# Patient Record
Sex: FEMALE | Race: WHITE | Employment: OTHER | URBAN - NONMETROPOLITAN AREA
[De-identification: names, ages, dates, MRNs, and addresses within clinical notes are randomized per-mention and may not be internally consistent; named-entity substitution may affect disease eponyms.]

---

## 2017-01-19 DIAGNOSIS — E78.5 HYPERLIPIDEMIA WITH TARGET LDL LESS THAN 100: ICD-10-CM

## 2017-01-19 DIAGNOSIS — E11.65 TYPE 2 DIABETES MELLITUS WITH HYPERGLYCEMIA, WITHOUT LONG-TERM CURRENT USE OF INSULIN (H): ICD-10-CM

## 2017-01-19 LAB
ANION GAP SERPL CALCULATED.3IONS-SCNC: 8 MMOL/L (ref 3–14)
BUN SERPL-MCNC: 13 MG/DL (ref 7–30)
CALCIUM SERPL-MCNC: 9.4 MG/DL (ref 8.5–10.1)
CHLORIDE SERPL-SCNC: 103 MMOL/L (ref 94–109)
CHOLEST SERPL-MCNC: 210 MG/DL
CO2 SERPL-SCNC: 29 MMOL/L (ref 20–32)
CREAT SERPL-MCNC: 0.74 MG/DL (ref 0.52–1.04)
EST. AVERAGE GLUCOSE BLD GHB EST-MCNC: 148 MG/DL
GFR SERPL CREATININE-BSD FRML MDRD: 77 ML/MIN/1.7M2
GLUCOSE SERPL-MCNC: 132 MG/DL (ref 70–99)
HBA1C MFR BLD: 6.8 % (ref 4.3–6)
HDLC SERPL-MCNC: 69 MG/DL
LDLC SERPL CALC-MCNC: 107 MG/DL
NONHDLC SERPL-MCNC: 141 MG/DL
POTASSIUM SERPL-SCNC: 3.5 MMOL/L (ref 3.4–5.3)
SODIUM SERPL-SCNC: 140 MMOL/L (ref 133–144)
TRIGL SERPL-MCNC: 168 MG/DL

## 2017-01-19 PROCEDURE — 80048 BASIC METABOLIC PNL TOTAL CA: CPT | Performed by: PHYSICIAN ASSISTANT

## 2017-01-19 PROCEDURE — 36415 COLL VENOUS BLD VENIPUNCTURE: CPT | Performed by: PHYSICIAN ASSISTANT

## 2017-01-19 PROCEDURE — 80061 LIPID PANEL: CPT | Performed by: PHYSICIAN ASSISTANT

## 2017-01-19 PROCEDURE — 40000788 ZZHCL STATISTIC ESTIMATED AVERAGE GLUCOSE: Performed by: PHYSICIAN ASSISTANT

## 2017-01-19 PROCEDURE — 83036 HEMOGLOBIN GLYCOSYLATED A1C: CPT | Performed by: PHYSICIAN ASSISTANT

## 2017-02-18 ENCOUNTER — HOSPITAL ENCOUNTER (EMERGENCY)
Facility: HOSPITAL | Age: 77
Discharge: HOME | End: 2017-02-18
Attending: PHYSICIAN ASSISTANT | Admitting: PHYSICIAN ASSISTANT
Payer: MEDICARE

## 2017-02-18 VITALS
TEMPERATURE: 98.2 F | OXYGEN SATURATION: 94 % | HEART RATE: 74 BPM | SYSTOLIC BLOOD PRESSURE: 186 MMHG | DIASTOLIC BLOOD PRESSURE: 94 MMHG | RESPIRATION RATE: 14 BRPM

## 2017-02-18 DIAGNOSIS — I10 ESSENTIAL HYPERTENSION: ICD-10-CM

## 2017-02-18 DIAGNOSIS — R51.9 NONINTRACTABLE HEADACHE, UNSPECIFIED CHRONICITY PATTERN, UNSPECIFIED HEADACHE TYPE: ICD-10-CM

## 2017-02-18 DIAGNOSIS — R82.81 PYURIA: ICD-10-CM

## 2017-02-18 LAB
ALBUMIN UR-MCNC: 30 MG/DL
ANION GAP SERPL CALCULATED.3IONS-SCNC: 11 MMOL/L (ref 3–14)
APPEARANCE UR: ABNORMAL
BACTERIA #/AREA URNS HPF: ABNORMAL /HPF
BASOPHILS # BLD AUTO: 0 10E9/L (ref 0–0.2)
BASOPHILS NFR BLD AUTO: 0.3 %
BILIRUB UR QL STRIP: NEGATIVE
BUN SERPL-MCNC: 16 MG/DL (ref 7–30)
CALCIUM SERPL-MCNC: 9.5 MG/DL (ref 8.5–10.1)
CHLORIDE SERPL-SCNC: 107 MMOL/L (ref 94–109)
CO2 SERPL-SCNC: 23 MMOL/L (ref 20–32)
COLOR UR AUTO: YELLOW
CREAT SERPL-MCNC: 0.78 MG/DL (ref 0.52–1.04)
DIFFERENTIAL METHOD BLD: NORMAL
EOSINOPHIL # BLD AUTO: 0.2 10E9/L (ref 0–0.7)
EOSINOPHIL NFR BLD AUTO: 1.8 %
ERYTHROCYTE [DISTWIDTH] IN BLOOD BY AUTOMATED COUNT: 13.2 % (ref 10–15)
GFR SERPL CREATININE-BSD FRML MDRD: 72 ML/MIN/1.7M2
GLUCOSE SERPL-MCNC: 121 MG/DL (ref 70–99)
GLUCOSE UR STRIP-MCNC: 30 MG/DL
HCT VFR BLD AUTO: 36.5 % (ref 35–47)
HGB BLD-MCNC: 12.7 G/DL (ref 11.7–15.7)
HGB UR QL STRIP: NEGATIVE
IMM GRANULOCYTES # BLD: 0 10E9/L (ref 0–0.4)
IMM GRANULOCYTES NFR BLD: 0.3 %
KETONES UR STRIP-MCNC: NEGATIVE MG/DL
LEUKOCYTE ESTERASE UR QL STRIP: ABNORMAL
LYMPHOCYTES # BLD AUTO: 2.2 10E9/L (ref 0.8–5.3)
LYMPHOCYTES NFR BLD AUTO: 21.9 %
MCH RBC QN AUTO: 31.1 PG (ref 26.5–33)
MCHC RBC AUTO-ENTMCNC: 34.8 G/DL (ref 31.5–36.5)
MCV RBC AUTO: 89 FL (ref 78–100)
MONOCYTES # BLD AUTO: 0.8 10E9/L (ref 0–1.3)
MONOCYTES NFR BLD AUTO: 8.1 %
NEUTROPHILS # BLD AUTO: 6.8 10E9/L (ref 1.6–8.3)
NEUTROPHILS NFR BLD AUTO: 67.6 %
NITRATE UR QL: NEGATIVE
NRBC # BLD AUTO: 0 10*3/UL
NRBC BLD AUTO-RTO: 0 /100
PH UR STRIP: 5.5 PH (ref 4.7–8)
PLATELET # BLD AUTO: 256 10E9/L (ref 150–450)
POTASSIUM SERPL-SCNC: 3.7 MMOL/L (ref 3.4–5.3)
RBC # BLD AUTO: 4.09 10E12/L (ref 3.8–5.2)
RBC #/AREA URNS AUTO: 9 /HPF (ref 0–2)
SODIUM SERPL-SCNC: 141 MMOL/L (ref 133–144)
SP GR UR STRIP: 1.01 (ref 1–1.03)
SQUAMOUS #/AREA URNS AUTO: 17 /HPF (ref 0–1)
URN SPEC COLLECT METH UR: ABNORMAL
UROBILINOGEN UR STRIP-MCNC: NORMAL MG/DL (ref 0–2)
WBC # BLD AUTO: 10 10E9/L (ref 4–11)
WBC #/AREA URNS AUTO: 67 /HPF (ref 0–2)

## 2017-02-18 PROCEDURE — 85025 COMPLETE CBC W/AUTO DIFF WBC: CPT | Performed by: PHYSICIAN ASSISTANT

## 2017-02-18 PROCEDURE — 99283 EMERGENCY DEPT VISIT LOW MDM: CPT | Performed by: PHYSICIAN ASSISTANT

## 2017-02-18 PROCEDURE — 36415 COLL VENOUS BLD VENIPUNCTURE: CPT | Performed by: PHYSICIAN ASSISTANT

## 2017-02-18 PROCEDURE — A9270 NON-COVERED ITEM OR SERVICE: HCPCS | Mod: GY | Performed by: PHYSICIAN ASSISTANT

## 2017-02-18 PROCEDURE — 80048 BASIC METABOLIC PNL TOTAL CA: CPT | Performed by: PHYSICIAN ASSISTANT

## 2017-02-18 PROCEDURE — 99283 EMERGENCY DEPT VISIT LOW MDM: CPT

## 2017-02-18 PROCEDURE — 81001 URINALYSIS AUTO W/SCOPE: CPT | Performed by: PHYSICIAN ASSISTANT

## 2017-02-18 PROCEDURE — 25000132 ZZH RX MED GY IP 250 OP 250 PS 637: Mod: GY | Performed by: PHYSICIAN ASSISTANT

## 2017-02-18 RX ORDER — IBUPROFEN 600 MG/1
600 TABLET, FILM COATED ORAL ONCE
Status: COMPLETED | OUTPATIENT
Start: 2017-02-18 | End: 2017-02-18

## 2017-02-18 RX ORDER — LISINOPRIL 5 MG/1
5 TABLET ORAL ONCE
Status: COMPLETED | OUTPATIENT
Start: 2017-02-18 | End: 2017-02-18

## 2017-02-18 RX ADMIN — LISINOPRIL 5 MG: 5 TABLET ORAL at 18:07

## 2017-02-18 RX ADMIN — IBUPROFEN 600 MG: 600 TABLET ORAL at 18:07

## 2017-02-18 ASSESSMENT — ENCOUNTER SYMPTOMS
FEVER: 0
VOMITING: 0
WEAKNESS: 0
BRUISES/BLEEDS EASILY: 0
SPEECH DIFFICULTY: 0
ACTIVITY CHANGE: 0
CHILLS: 0
NECK PAIN: 0
FATIGUE: 0
ABDOMINAL PAIN: 0
PHOTOPHOBIA: 0
FREQUENCY: 0
SHORTNESS OF BREATH: 0
HEADACHES: 1
DYSURIA: 0
CHEST TIGHTNESS: 0
NUMBNESS: 0
DIZZINESS: 1
PALPITATIONS: 0
NAUSEA: 0

## 2017-02-18 NOTE — ED AVS SNAPSHOT
[facility]    [address] [address]    Phone:  [phone]                                       Ena Lanza   MRN: [id number]    Department:  [facility]   Date of Visit:  2/18/2017           After Visit Summary Signature Page     I have received my discharge instructions, and my questions have been answered. I have discussed any challenges I see with this plan with the nurse or doctor.    ..........................................................................................................................................  Patient/Patient Representative Signature      ..........................................................................................................................................  Patient Representative Print Name and Relationship to Patient    ..................................................               ................................................  Date                                            Time    ..........................................................................................................................................  Reviewed by Signature/Title    ...................................................              ..............................................  Date                                                            Time

## 2017-02-18 NOTE — ED AVS SNAPSHOT
[facility]    [address]    [place] [address]    Phone:  [phone]                                       Ena Lanza   MRN: [id number]    Department:  [facility]   Date of Visit:  2/18/2017           Patient Information     Date Of Birth          1940        Your diagnoses for this visit were:     Nonintractable headache, unspecified chronicity pattern, unspecified headache type     Essential hypertension        You were seen by Anne-Marie Cristobal PA-C.      Follow-up Information     Follow up with Amanda Luu PA.    Specialty:  Family Practice    Contact information:    [facility]  [address]  [place] [address] [phone]          Follow up with [facility].    Specialty:  EMERGENCY MEDICINE    Why:  If symptoms worsen    Contact information:    [address]  [place] [address] [phone]    Additional information:    From [place]: Take [address]. Turn left at [address]. Turn left at the first stoplight on [address]. At the first stop sign, take a right onto [address]. Take a left into the parking lot and continue through until you reach the North enterance of the building.       From [place]: Take [address]. Take the [address] ramp towards [place]. Turn left onto [address]. Take a slight right onto [address]. Turn left at the first stoplight on [address]. At the first stop sign, take a right onto [address]. Take a left into the parking lot and continue through until you reach the North enterance of the building.       From [place]: Take [address]. Take a right at [address]. At the first stop sign, take a right onto [address]. Take a left into the parking lot and continue through until you reach the North enterance of the building.         Discharge Instructions       Follow-up with EFREN Mccloud  on Monday for recheck.     Please return HERE for ANY other concerns or questions.  Especially any return of headache, weakness, dizziness, or simply return here for ANY other concerns or questions.     Discharge References/Attachments     HEADACHES, SELF-CARE FOR (ENGLISH)    HIGH BLOOD PRESSURE, ESTABLISHED, OUT OF CONTROL (ENGLISH)      Future Appointments        Provider Department Dept Phone Center    2/22/2017 2:15 PM [phone] [facility]         Review of your medicines      CONTINUE these medicines which may have CHANGED, or have new prescriptions. If we are uncertain of the size of tablets/capsules you have at home, strength may be listed as something that might have changed.        Dose / Directions Last dose taken    lisinopril 2.5 MG tablet   Commonly known as:  PRINIVIL/Zestril   Dose:  2.5 mg   What changed:  how much to take   Quantity:  90 tablet        Take 1 tablet (2.5 mg) by mouth daily   Refills:  1        metFORMIN 500 MG 24 hr tablet   Commonly known as:  GLUCOPHAGE-XR   Dose:  1000 mg   What changed:  additional instructions   Quantity:  120 tablet        Take 2 tablets (1,000 mg) by mouth 2 times daily (with meals)   Refills:  5          Our records show that you are taking the medicines listed below. If these are incorrect, please call your family doctor or clinic.        Dose / Directions Last dose taken    aspirin 81 MG chewable tablet   Dose:  81 mg   Quantity:  108 tablet        Take 1 tablet (81 mg) by mouth daily   Refills:  3        blood glucose monitoring lancets   Quantity:  102 Box        Use to test blood sugar 2 times daily.   Refills:  5        blood glucose monitoring test strip   Commonly known as:  ACCU-CHEK SMARTVIEW   Quantity:  100 each        Use to test blood sugar 2 times daily or as directed   Refills:  5        ibuprofen 800 MG tablet   Commonly known as:  ADVIL/MOTRIN   Dose:  800 mg   Quantity:  90 tablet         "Take 1 tablet (800 mg) by mouth every 8 hours as needed for moderate pain   Refills:  1        ketoconazole 2 % cream   Commonly known as:  NIZORAL   Quantity:  60 g        Apply topically 2 times daily   Refills:  4        STATIN NOT PRESCRIBED (INTENTIONAL)   Quantity:  0 each        Statin not prescribed intentionally due to Intolerance (with supporting documentation of trying a statin at least once within the last 5 years)   Refills:  0                Procedures and tests performed during your visit     Basic metabolic panel    CBC with platelets differential    UA reflex to Microscopic      Orders Needing Specimen Collection     None      Pending Results     No orders found from 2017 to 2017.            Pending Culture Results     No orders found from 2017 to 2017.            Thank you for choosing [facility]       Thank you for choosing [facility] for your care. Our goal is always to provide you with excellent care. Hearing back from our patients is one way we can continue to improve our services. Please take a few minutes to complete the written survey that you may receive in the mail after you visit with us. Thank you!        [facility] Information     [employer] lets you send messages to your doctor, view your test results, renew your prescriptions, schedule appointments and more. To sign up, go to [email or web address] . Click on \"Log in\" on the left side of the screen, which will take you to the Welcome page. Then click on \"Sign up Now\" on the right side of the page.     You will be asked to enter the access code listed below, as well as some personal information. Please follow the directions to create your username and password.     Your access code is: [email or web address]  Expires: 2017 11:29 AM     Your access code will  in 90 days. If you need help or a new code, please call your [facility] clinic or [phone].        Care EveryWhere ID     This is your Care EveryWhere ID. This could " be used by other organizations to access your [facility] medical records  [id number]        After Visit Summary       This is your record. Keep this with you and show to your community pharmacist(s) and doctor(s) at your next visit.

## 2017-02-19 NOTE — ED NOTES
Pt brought to er by family for increased bp and headache.  Pt reports that she takes lisinopril 5 mg daily.  Pt states that she does not always take her medication.  Her bp was increased on Thursday at the dentist.  She started taking her meds again and even doubled the dose.  Pt reports to er tonight with increased headache and bp.  Assessment as charted.  Pt placed on cardiac and resp monitor.  waage at the bedside

## 2017-02-19 NOTE — DISCHARGE INSTRUCTIONS
Follow-up with EFREN Mccloud on Monday for recheck.     Please return HERE for ANY other concerns or questions.  Especially any return of headache, weakness, dizziness, or simply return here for ANY other concerns or questions.

## 2017-02-19 NOTE — ED PROVIDER NOTES
"  History     Chief Complaint   Patient presents with     Hypertension     \"foggy\" and nauseated last night. headache today. told at dentist last week BP was in the 220s. has not been in to see PCP. appt next week. Pt states that she is on 5mg of lisinopril and \"I have not been very good about taking it. The day I went to the dentist, I didnt take myt lisinopril and I had 2 cups of coffee.\"      The history is provided by the patient.     Ena Lanza is a 76 year old female who presented to the ED ambulatory for evaluation of high blood pressure and a mild headache.  She has chronic HTN and takes Lisinopril 5mg.  She does admit to not taking the lisinopril daily.  No falls.  No troubles waling or talking.  Headache is described as mild and frontal.  No chest pain.  She does have underlying diabetes.  No abdominal pain.      Past Medical History   Diagnosis Date     Anxiety      no treatment     Essential hypertension 10/5/2015     Essential hypertension with goal blood pressure less than 140/90 6/13/2016     Herpes zoster with complication 6/13/2016     HTN (hypertension) 10/29/2014     Hyperlipidemia LDL goal < 100 10/29/2014     Hyperlipidemia LDL goal < 100 10/29/2014     Lesion of sciatic nerve 8/27/2014     Newly recognized heart murmur 6/13/2016     Type 2 diabetes mellitus with hyperglycemia, without long-term current use of insulin (H) 11/28/2016     Type 2 diabetes mellitus without complication (H) 10/5/2015      Social History     Social History     Marital status:      Spouse name: N/A     Number of children: N/A     Years of education: N/A     Social History Main Topics     Smoking status: Never Smoker     Smokeless tobacco: Never Used      Comment: yes passive exposure     Alcohol use No     Drug use: No     Sexual activity: No      Comment:      Other Topics Concern      Service No     Blood Transfusions Yes     Permits if needed     Caffeine Concern Yes     coffee, 5 cups daily "     Seat Belt Yes     Parent/Sibling W/ Cabg, Mi Or Angioplasty Before 65f 55m? No     Social History Narrative      Family History   Problem Relation Age of Onset     C.A.D. Father 71     cause of death     CANCER Father      melanoma     Coronary Artery Disease Father      Thyroid Disease Mother      Hypertension Mother      Thyroid Disease Sister      Other Cancer Sister      melanoma     DIABETES No family hx of      Hyperlipidemia No family hx of      Asthma No family hx of      Breast Cancer No family hx of      Colon Cancer No family hx of        I have reviewed the Medications, Allergies, Past Medical and Surgical History, and Social History in the Epic system.    Review of Systems   Constitutional: Negative for activity change, chills, fatigue and fever.   Eyes: Negative for photophobia and visual disturbance.   Respiratory: Negative for chest tightness and shortness of breath.    Cardiovascular: Negative for chest pain and palpitations.   Gastrointestinal: Negative for abdominal pain, nausea and vomiting.   Genitourinary: Negative for dysuria, frequency, pelvic pain and urgency.   Musculoskeletal: Negative for neck pain.   Skin: Negative.    Neurological: Positive for dizziness and headaches. Negative for syncope, speech difficulty, weakness and numbness.   Hematological: Does not bruise/bleed easily.       Physical Exam   BP: (!) 194/104  Heart Rate: 81  Temp: 98  F (36.7  C)  Resp: 18  SpO2: 98 %  Physical Exam   Constitutional: She is oriented to person, place, and time. She appears well-developed and well-nourished. No distress.   Pleasant and talkative    HENT:   Head is atraumatic and normocephalic.  External auditory canals are clear and without edema or erythema.  TMs are pearly gray and unremarkable. Posterior pharynx has no swelling, erythema, or exudate.  Oral mucosa is pink and moist, without petechia, lesions, or ulcer.  Tongue is midline.  Palate rises symmetric.    Eyes:   Extraocular  movements are intact and smooth throughout the H.  Conjunctiva are normal.  There is no horizontal or vertical nystagmus.  Pupils are equil in size and reactive to light. Lids are unremarkable.  Does have some small bilateral cataracts    Neck: Normal range of motion. Neck supple.   No bruits    Cardiovascular: Normal rate and regular rhythm.    Pulmonary/Chest: Effort normal and breath sounds normal. No respiratory distress.   Abdominal: Soft. There is no tenderness. There is no guarding.   Musculoskeletal: She exhibits no edema.   Neurological: She is alert and oriented to person, place, and time.   Neurological examination:  That the patient was awake and alert, the attention, orientation, concentration, language, memory and fund of knowledge were all normal.  The patient had no neglect.    Smooth speech.  Smooth gait.     Cranial nerve examination: revealed that for cranial nerve   II: the pupils were reactive  III, IV, and VI, the extraocular movements were full.    V: facial sensation intact bilateral   VII: facial movements are symmetric  VIII: hearing intact to voice  IX & X: the soft palate rises symmetrically   XI: shoulder movements are symmetric  XII: tongue is midline   Skin: Skin is warm and dry.   Psychiatric: She has a normal mood and affect.   Nursing note and vitals reviewed.      ED Course     ED Course     Procedures        Medications   lisinopril (PRINIVIL/ZESTRIL) tablet 5 mg (5 mg Oral Given 2/18/17 1807)   ibuprofen (ADVIL/MOTRIN) tablet 600 mg (600 mg Oral Given 2/18/17 1807)     Results for orders placed or performed during the hospital encounter of 02/18/17 (from the past 24 hour(s))   CBC with platelets differential   Result Value Ref Range    WBC 10.0 4.0 - 11.0 10e9/L    RBC Count 4.09 3.8 - 5.2 10e12/L    Hemoglobin 12.7 11.7 - 15.7 g/dL    Hematocrit 36.5 35.0 - 47.0 %    MCV 89 78 - 100 fl    MCH 31.1 26.5 - 33.0 pg    MCHC 34.8 31.5 - 36.5 g/dL    RDW 13.2 10.0 - 15.0 %    Platelet  Count 256 150 - 450 10e9/L    Diff Method Automated Method     % Neutrophils 67.6 %    % Lymphocytes 21.9 %    % Monocytes 8.1 %    % Eosinophils 1.8 %    % Basophils 0.3 %    % Immature Granulocytes 0.3 %    Nucleated RBCs 0 0 /100    Absolute Neutrophil 6.8 1.6 - 8.3 10e9/L    Absolute Lymphocytes 2.2 0.8 - 5.3 10e9/L    Absolute Monocytes 0.8 0.0 - 1.3 10e9/L    Absolute Eosinophils 0.2 0.0 - 0.7 10e9/L    Absolute Basophils 0.0 0.0 - 0.2 10e9/L    Abs Immature Granulocytes 0.0 0 - 0.4 10e9/L    Absolute Nucleated RBC 0.0    Basic metabolic panel   Result Value Ref Range    Sodium 141 133 - 144 mmol/L    Potassium 3.7 3.4 - 5.3 mmol/L    Chloride 107 94 - 109 mmol/L    Carbon Dioxide 23 20 - 32 mmol/L    Anion Gap 11 3 - 14 mmol/L    Glucose 121 (H) 70 - 99 mg/dL    Urea Nitrogen 16 7 - 30 mg/dL    Creatinine 0.78 0.52 - 1.04 mg/dL    GFR Estimate 72 >60 mL/min/1.7m2    GFR Estimate If Black 88 >60 mL/min/1.7m2    Calcium 9.5 8.5 - 10.1 mg/dL   UA reflex to Microscopic   Result Value Ref Range    Color Urine Yellow     Appearance Urine Slightly Cloudy     Glucose Urine 30 (A) NEG mg/dL    Bilirubin Urine Negative NEG    Ketones Urine Negative NEG mg/dL    Specific Gravity Urine 1.015 1.003 - 1.035    Blood Urine Negative NEG    pH Urine 5.5 4.7 - 8.0 pH    Protein Albumin Urine 30 (A) NEG mg/dL    Urobilinogen mg/dL Normal 0.0 - 2.0 mg/dL    Nitrite Urine Negative NEG    Leukocyte Esterase Urine Large (A) NEG    Source Midstream Urine     RBC Urine 9 (H) 0 - 2 /HPF    WBC Urine 67 (H) 0 - 2 /HPF    Bacteria Urine Many (A) NEG /HPF    Squamous Epithelial /HPF Urine 17 (H) 0 - 1 /HPF        Critical Care time:  none               Labs Ordered and Resulted from Time of ED Arrival Up to the Time of Departure from the ED   BASIC METABOLIC PANEL - Abnormal; Notable for the following:        Result Value    Glucose 121 (*)     All other components within normal limits   URINE MACROSCOPIC WITH REFLEX TO MICRO - Abnormal;  Notable for the following:     Glucose Urine 30 (*)     Protein Albumin Urine 30 (*)     Leukocyte Esterase Urine Large (*)     RBC Urine 9 (*)     WBC Urine 67 (*)     Bacteria Urine Many (*)     Squamous Epithelial /HPF Urine 17 (*)     All other components within normal limits   CBC WITH PLATELETS DIFFERENTIAL       Assessments & Plan (with Medical Decision Making)   Work-up as above.  NO indication for adding a different class of antihypertensive. Ibuprofen resolved the headache.  BP came down nicely with simply adding her usual dose of lisinopril.  Her exam is entirely negative.  No indication for EKG for CT.  UA is abnormal but also grossly contaminated.  She has no LUTS.  No indication to treat or culture.  Simply needs a recheck at the clinic.  Return here for ANY return of headaches, weakness, dizziness, difficulty walking or talking, or simply return for ANY other concerns or questions. Ms. Lanza voiced complete understanding and was happy and agreeable.     I have reviewed the nursing notes.    I have reviewed the findings, diagnosis, plan and need for follow up with the patient.    Discharge Medication List as of 2/18/2017  7:39 PM          Final diagnoses:   Nonintractable headache, unspecified chronicity pattern, unspecified headache type   Essential hypertension   Pyuria       2/18/2017   [facility]     Anne-Marie Cristobal PA-C  02/18/17 2002

## 2017-02-20 ENCOUNTER — OFFICE VISIT (OUTPATIENT)
Dept: FAMILY MEDICINE | Facility: OTHER | Age: 77
End: 2017-02-20
Attending: PHYSICIAN ASSISTANT
Payer: COMMERCIAL

## 2017-02-20 VITALS
DIASTOLIC BLOOD PRESSURE: 84 MMHG | TEMPERATURE: 98 F | WEIGHT: 160.2 LBS | SYSTOLIC BLOOD PRESSURE: 156 MMHG | OXYGEN SATURATION: 98 % | HEIGHT: 64 IN | BODY MASS INDEX: 27.35 KG/M2 | RESPIRATION RATE: 16 BRPM | HEART RATE: 77 BPM

## 2017-02-20 DIAGNOSIS — R01.1 HEART MURMUR: Primary | ICD-10-CM

## 2017-02-20 DIAGNOSIS — I10 ESSENTIAL HYPERTENSION WITH GOAL BLOOD PRESSURE LESS THAN 140/90: ICD-10-CM

## 2017-02-20 LAB
ALBUMIN UR-MCNC: ABNORMAL MG/DL
APPEARANCE UR: CLEAR
BACTERIA #/AREA URNS HPF: ABNORMAL /HPF
BILIRUB UR QL STRIP: NEGATIVE
COLOR UR AUTO: YELLOW
GLUCOSE UR STRIP-MCNC: 250 MG/DL
HGB UR QL STRIP: NEGATIVE
KETONES UR STRIP-MCNC: NEGATIVE MG/DL
LEUKOCYTE ESTERASE UR QL STRIP: ABNORMAL
NITRATE UR QL: NEGATIVE
NON-SQ EPI CELLS #/AREA URNS LPF: ABNORMAL /LPF
PH UR STRIP: 5.5 PH (ref 5–7)
RBC #/AREA URNS AUTO: ABNORMAL /HPF (ref 0–2)
SP GR UR STRIP: 1.02 (ref 1–1.03)
URN SPEC COLLECT METH UR: ABNORMAL
UROBILINOGEN UR STRIP-ACNC: 0.2 EU/DL (ref 0.2–1)
WBC #/AREA URNS AUTO: ABNORMAL /HPF (ref 0–2)

## 2017-02-20 PROCEDURE — 81001 URINALYSIS AUTO W/SCOPE: CPT | Performed by: PHYSICIAN ASSISTANT

## 2017-02-20 PROCEDURE — 99212 OFFICE O/P EST SF 10 MIN: CPT

## 2017-02-20 PROCEDURE — 99214 OFFICE O/P EST MOD 30 MIN: CPT | Performed by: PHYSICIAN ASSISTANT

## 2017-02-20 RX ORDER — LISINOPRIL 20 MG/1
20 TABLET ORAL DAILY
Qty: 30 TABLET | Refills: 1 | Status: SHIPPED | OUTPATIENT
Start: 2017-02-20 | End: 2017-03-22

## 2017-02-20 ASSESSMENT — ANXIETY QUESTIONNAIRES
5. BEING SO RESTLESS THAT IT IS HARD TO SIT STILL: MORE THAN HALF THE DAYS
IF YOU CHECKED OFF ANY PROBLEMS ON THIS QUESTIONNAIRE, HOW DIFFICULT HAVE THESE PROBLEMS MADE IT FOR YOU TO DO YOUR WORK, TAKE CARE OF THINGS AT HOME, OR GET ALONG WITH OTHER PEOPLE: NOT DIFFICULT AT ALL
1. FEELING NERVOUS, ANXIOUS, OR ON EDGE: MORE THAN HALF THE DAYS
3. WORRYING TOO MUCH ABOUT DIFFERENT THINGS: SEVERAL DAYS
2. NOT BEING ABLE TO STOP OR CONTROL WORRYING: SEVERAL DAYS
7. FEELING AFRAID AS IF SOMETHING AWFUL MIGHT HAPPEN: NOT AT ALL
GAD7 TOTAL SCORE: 9
6. BECOMING EASILY ANNOYED OR IRRITABLE: NOT AT ALL

## 2017-02-20 ASSESSMENT — PAIN SCALES - GENERAL: PAINLEVEL: NO PAIN (0)

## 2017-02-20 ASSESSMENT — PATIENT HEALTH QUESTIONNAIRE - PHQ9: 5. POOR APPETITE OR OVEREATING: NEARLY EVERY DAY

## 2017-02-20 NOTE — PATIENT INSTRUCTIONS
Thank you for choosing [facility].   I appreciate the opportunity to serve you and look forward to supporting your healthcare needs in the future. Please contact me with any questions or concerns that you may have.    Sincerely,    Amanda Luu RN, PA-C

## 2017-02-20 NOTE — NURSING NOTE
"Chief Complaint   Patient presents with     Hypertension       Initial /84 (BP Location: Right arm, Patient Position: Chair, Cuff Size: Adult Regular)  Pulse 77  Temp 98  F (36.7  C) (Tympanic)  Resp 16  Ht 5' 4\" (1.626 m)  Wt 160 lb 3.2 oz (72.7 kg)  SpO2 98%  BMI 27.5 kg/m2 Estimated body mass index is 27.5 kg/(m^2) as calculated from the following:    Height as of this encounter: 5' 4\" (1.626 m).    Weight as of this encounter: 160 lb 3.2 oz (72.7 kg).  Medication Reconciliation: complete   Jazz Witt    "

## 2017-02-20 NOTE — PROGRESS NOTES
SUBJECTIVE:                                                    Ena Lanza is a 76 year old female who presents to clinic today for the following health issues:      Hypertension Follow-up  Pt has been having elevated BP's at home. Pt did have a HA last week for 3 days. She did go to  on Saturday. The dizziness has cleared up, but nausea continues slightly. Pt has increased her Lisinopril to 10 mg a day.      Outpatient blood pressures are being checked at home.  Results are 209- 162/112-86.    Low Salt Diet: not monitoring salt, but does not add to food/cooking       Amount of exercise or physical activity: None, other than daily activities    Problems taking medications regularly: No    Medication side effects: none  Diet: low salt and carbohydrate counting        Problem list and histories reviewed & adjusted, as indicated.  Additional history: as documented    Patient Active Problem List   Diagnosis     Lesion of sciatic nerve     Hyperlipidemia with target LDL less than 100     HTN (hypertension)     DDD (degenerative disc disease), lumbar     Essential hypertension     Type 2 diabetes mellitus without complication (H)     ACP (advance care planning)     Newly recognized heart murmur     Essential hypertension with goal blood pressure less than 140/90     Herpes zoster with complication     Type 2 diabetes mellitus with hyperglycemia, without long-term current use of insulin (H)     History reviewed. No pertinent past surgical history.    Social History   Substance Use Topics     Smoking status: Never Smoker     Smokeless tobacco: Never Used      Comment: yes passive exposure     Alcohol use No     Family History   Problem Relation Age of Onset     C.A.D. Father 71     cause of death     CANCER Father      melanoma     Coronary Artery Disease Father      Thyroid Disease Mother      Hypertension Mother      Thyroid Disease Sister      Other Cancer Sister      melanoma     DIABETES No family hx of       Hyperlipidemia No family hx of      Asthma No family hx of      Breast Cancer No family hx of      Colon Cancer No family hx of          Current Outpatient Prescriptions   Medication Sig Dispense Refill     blood glucose monitoring (ACCU-CHEK FASTCLIX) lancets Use to test blood sugar 2 times daily. 102 Box 5     blood glucose monitoring (ACCU-CHEK SMARTVIEW) test strip Use to test blood sugar 2 times daily or as directed 100 each 5     metFORMIN (GLUCOPHAGE-XR) 500 MG 24 hr tablet Take 2 tablets (1,000 mg) by mouth 2 times daily (with meals) (Patient taking differently: Take 1,000 mg by mouth 2 times daily (with meals) 500 mg in am and 1000 mg in evening) 120 tablet 5     lisinopril (PRINIVIL,ZESTRIL) 2.5 MG tablet Take 1 tablet (2.5 mg) by mouth daily (Patient taking differently: Take 5 mg by mouth daily ) 90 tablet 1     aspirin 81 MG chewable tablet Take 1 tablet (81 mg) by mouth daily 108 tablet 3     STATIN NOT PRESCRIBED, INTENTIONAL, Statin not prescribed intentionally due to Intolerance (with supporting documentation of trying a statin at least once within the last 5 years) 0 each 0     ketoconazole (NIZORAL) 2 % cream Apply topically 2 times daily 60 g 4     ibuprofen (ADVIL,MOTRIN) 800 MG tablet Take 1 tablet (800 mg) by mouth every 8 hours as needed for moderate pain 90 tablet 1     No Known Allergies  Recent Labs   Lab Test  02/18/17   1806  01/19/17   0916  06/13/16   1050  10/21/15   1008  01/26/15   1348   02/10/14   0945   A1C   --   6.8*  6.7*  6.4*   --    < >  12.9*   LDL   --   107*  110*  143*   --    < >   --    HDL   --   69  67  73   --    < >   --    TRIG   --   168*  202*  199*   --    < >   --    ALT   --    --   18  27  34   --   27   CR  0.78  0.74  0.70  0.65  0.57   < >  0.70   GFRESTIMATED  72  77  81  89  >90  Non  GFR Calc     < >  82   GFRESTBLACK  88  >90   GFR Calc    >90   GFR Calc    >90  >90  African American GFR Calc     < >   ">90   POTASSIUM  3.7  3.5  3.6  3.9  3.5   < >  3.8   TSH   --    --   1.03   --    --    --   1.58    < > = values in this interval not displayed.      BP Readings from Last 3 Encounters:   02/20/17 156/84   02/18/17 (!) 186/94   11/28/16 170/70    Wt Readings from Last 3 Encounters:   02/20/17 160 lb 3.2 oz (72.7 kg)   11/28/16 165 lb (74.8 kg)   06/13/16 158 lb (71.7 kg)                  Problem list, Medication list, Allergies, and Medical/Social/Surgical histories reviewed in [facility] and updated as appropriate.    ROS:  Constitutional, neuro, ENT, endocrine, pulmonary, cardiac, gastrointestinal, genitourinary, musculoskeletal, integument and psychiatric systems are negative, except as otherwise noted.    OBJECTIVE:                                                    /84 (BP Location: Right arm, Patient Position: Chair, Cuff Size: Adult Regular)  Pulse 77  Temp 98  F (36.7  C) (Tympanic)  Resp 16  Ht 5' 4\" (1.626 m)  Wt 160 lb 3.2 oz (72.7 kg)  SpO2 98%  BMI 27.5 kg/m2  Body mass index is 27.5 kg/(m^2).  GENERAL APPEARANCE: healthy, alert and no distress  EYES: Eyes grossly normal to inspection, PERRL and conjunctivae and sclerae normal  HENT: ear canals and TM's normal and nose and mouth without ulcers or lesions  NECK: no adenopathy, no asymmetry, masses, or scars and thyroid normal to palpation  RESP: lungs clear to auscultation - no rales, rhonchi or wheezes  CV: regular rates and rhythm, normal S1 S2, no S3 or S4 and 2/6 murmur right sternal border is loudest no radiation and no click or rub  LYMPHATICS: normal ant/post cervical and supraclavicular nodes  ABDOMEN: soft, nontender, without hepatosplenomegaly or masses and bowel sounds normal  MS: extremities normal- no gross deformities noted  SKIN: no suspicious lesions or rashes  NEURO: Normal strength and tone, mentation intact and speech normal  PSYCH: mentation appears normal and affect normal/bright    Diagnostic test results:  Diagnostic Test " Results:  Results for orders placed or performed during the hospital encounter of 02/18/17   CBC with platelets differential   Result Value Ref Range    WBC 10.0 4.0 - 11.0 10e9/L    RBC Count 4.09 3.8 - 5.2 10e12/L    Hemoglobin 12.7 11.7 - 15.7 g/dL    Hematocrit 36.5 35.0 - 47.0 %    MCV 89 78 - 100 fl    MCH 31.1 26.5 - 33.0 pg    MCHC 34.8 31.5 - 36.5 g/dL    RDW 13.2 10.0 - 15.0 %    Platelet Count 256 150 - 450 10e9/L    Diff Method Automated Method     % Neutrophils 67.6 %    % Lymphocytes 21.9 %    % Monocytes 8.1 %    % Eosinophils 1.8 %    % Basophils 0.3 %    % Immature Granulocytes 0.3 %    Nucleated RBCs 0 0 /100    Absolute Neutrophil 6.8 1.6 - 8.3 10e9/L    Absolute Lymphocytes 2.2 0.8 - 5.3 10e9/L    Absolute Monocytes 0.8 0.0 - 1.3 10e9/L    Absolute Eosinophils 0.2 0.0 - 0.7 10e9/L    Absolute Basophils 0.0 0.0 - 0.2 10e9/L    Abs Immature Granulocytes 0.0 0 - 0.4 10e9/L    Absolute Nucleated RBC 0.0    Basic metabolic panel   Result Value Ref Range    Sodium 141 133 - 144 mmol/L    Potassium 3.7 3.4 - 5.3 mmol/L    Chloride 107 94 - 109 mmol/L    Carbon Dioxide 23 20 - 32 mmol/L    Anion Gap 11 3 - 14 mmol/L    Glucose 121 (H) 70 - 99 mg/dL    Urea Nitrogen 16 7 - 30 mg/dL    Creatinine 0.78 0.52 - 1.04 mg/dL    GFR Estimate 72 >60 mL/min/1.7m2    GFR Estimate If Black 88 >60 mL/min/1.7m2    Calcium 9.5 8.5 - 10.1 mg/dL   UA reflex to Microscopic   Result Value Ref Range    Color Urine Yellow     Appearance Urine Slightly Cloudy     Glucose Urine 30 (A) NEG mg/dL    Bilirubin Urine Negative NEG    Ketones Urine Negative NEG mg/dL    Specific Gravity Urine 1.015 1.003 - 1.035    Blood Urine Negative NEG    pH Urine 5.5 4.7 - 8.0 pH    Protein Albumin Urine 30 (A) NEG mg/dL    Urobilinogen mg/dL Normal 0.0 - 2.0 mg/dL    Nitrite Urine Negative NEG    Leukocyte Esterase Urine Large (A) NEG    Source Midstream Urine     RBC Urine 9 (H) 0 - 2 /HPF    WBC Urine 67 (H) 0 - 2 /HPF    Bacteria Urine Many  (A) NEG /HPF    Squamous Epithelial /HPF Urine 17 (H) 0 - 1 /HPF        ASSESSMENT/PLAN:                                                    1. Essential hypertension with goal blood pressure less than 140/90  She was in the ER, has come down and she is going to be giving us another urine as her last one was contaminated.  She will go to 20 mg on Lisinopril.   She is aware of DASH diet and is avoidant of Salt.   She will be checking her BP at home and then recheck with us again in one month.   - lisinopril (PRINIVIL/ZESTRIL) 20 MG tablet; Take 1 tablet (20 mg) by mouth daily  Dispense: 30 tablet; Refill: 1  - UA with Microscopic reflex to Culture  e    2. Heart murmur  Is fairly loud today.  In past thought i could hear one then the next time I wouldn't.  Get ECHO.   RSB is loudest point.  No dizziness or weakness.  No fainting.  Better control of blood pressure today and we will make sure we are getting her pressure down and see her back rosa  Month.  We have done a good job with her sugars.     - Echocardiogram; Future  - Echocardiogram      See Patient Instructions    Amanda Luu PA-C  [facility]

## 2017-02-20 NOTE — MR AVS SNAPSHOT
After Visit Summary   2/20/2017    Ena Lanza    MRN: [id number]           Patient Information     Date Of Birth          1940        Visit Information        Provider Department      2/20/2017 1:45 PM Amanda Luu PA [facility]        Today's Diagnoses     Heart murmur    -  1    Essential hypertension with goal blood pressure less than 140/90          Care Instructions    Thank you for choosing [facility].   I appreciate the opportunity to serve you and look forward to supporting your healthcare needs in the future. Please contact me with any questions or concerns that you may have.    Sincerely,    Amanda Luu RN, PA-C           Follow-ups after your visit        Your next 10 appointments already scheduled     Feb 22, 2017  2:15 PM CST   (Arrive by 2:00 PM)   SHORT with EFREN Salinas   [facility])    [address]  [facility] [address]   [phone]              Who to contact     If you have questions or need follow up information about today's clinic visit or your schedule please contact [facility] directly at [phone].  Normal or non-critical lab and imaging results will be communicated to you by MyChart, letter or phone within 4 business days after the clinic has received the results. If you do not hear from us within 7 days, please contact the clinic through [employer] or phone. If you have a critical or abnormal lab result, we will notify you by phone as soon as possible.  Submit refill requests through [employer] or call your pharmacy and they will forward the refill request to us. Please allow 3 business days for your refill to be completed.          Additional Information About Your Visit        [employer] Information     [employer] lets you send messages to your doctor, view your test results, renew your prescriptions, schedule appointments and more. To sign up, go to [email or web address] .  "Click on \"Log in\" on the left side of the screen, which will take you to the Welcome page. Then click on \"Sign up Now\" on the right side of the page.     You will be asked to enter the access code listed below, as well as some personal information. Please follow the directions to create your username and password.     Your access code is: [email or web address]  Expires: 2017 11:29 AM     Your access code will  in 90 days. If you need help or a new code, please call your [place] clinic or [phone].        Care EveryWhere ID     This is your Care EveryWhere ID. This could be used by other organizations to access your [place] medical records  [id number]        Your Vitals Were     Pulse Temperature Respirations Height Pulse Oximetry BMI (Body Mass Index)    77 98  F (36.7  C) (Tympanic) 16 5' 4\" (1.626 m) 98% 27.5 kg/m2       Blood Pressure from Last 3 Encounters:   17 156/84   17 (!) 186/94   16 170/70    Weight from Last 3 Encounters:   17 160 lb 3.2 oz (72.7 kg)   16 165 lb (74.8 kg)   16 158 lb (71.7 kg)              We Performed the Following     Echocardiogram     UA with Microscopic reflex to Culture          Today's Medication Changes          These changes are accurate as of: 17  2:26 PM.  If you have any questions, ask your nurse or doctor.               These medicines have changed or have updated prescriptions.        Dose/Directions    lisinopril 20 MG tablet   Commonly known as:  PRINIVIL/ZESTRIL   This may have changed:    - medication strength  - how much to take   Used for:  Essential hypertension with goal blood pressure less than 140/90   Changed by:  Amanda Luu PA        Dose:  20 mg   Take 1 tablet (20 mg) by mouth daily   Quantity:  30 tablet   Refills:  1       metFORMIN 500 MG 24 hr tablet   Commonly known as:  GLUCOPHAGE-XR   This may have changed:  additional instructions   Used for:  Type 2 diabetes mellitus with hyperglycemia, without " long-term current use of insulin (H)        Dose:  1000 mg   Take 2 tablets (1,000 mg) by mouth 2 times daily (with meals)   Quantity:  120 tablet   Refills:  5            Where to get your medicines      These medications were sent to [id number] - [place], [place] - [address] AT [facility] [address], [place] [address]     Phone:  [phone]     lisinopril 20 MG tablet                Primary Care Provider Office Phone # Fax #    EFREN Salinas [phone]       [facility] [address]  [place] [address]        Thank you!     Thank you for choosing [facility]  for your care. Our goal is always to provide you with excellent care. Hearing back from our patients is one way we can continue to improve our services. Please take a few minutes to complete the written survey that you may receive in the mail after your visit with us. Thank you!             Your Updated Medication List - Protect others around you: Learn how to safely use, store and throw away your medicines at www.disposemymeds.org.          This list is accurate as of: 2/20/17  2:26 PM.  Always use your most recent med list.                   Brand Name Dispense Instructions for use    aspirin 81 MG chewable tablet     108 tablet    Take 1 tablet (81 mg) by mouth daily       blood glucose monitoring lancets     102 Box    Use to test blood sugar 2 times daily.       blood glucose monitoring test strip    ACCU-CHEK SMARTVIEW    100 each    Use to test blood sugar 2 times daily or as directed       ibuprofen 800 MG tablet    ADVIL/MOTRIN    90 tablet    Take 1 tablet (800 mg) by mouth every 8 hours as needed for moderate pain       ketoconazole 2 % cream    NIZORAL    60 g    Apply topically 2 times daily       lisinopril 20 MG tablet    PRINIVIL/ZESTRIL    30 tablet    Take 1 tablet (20 mg) by mouth daily       metFORMIN 500 MG 24 hr tablet    GLUCOPHAGE-XR    120  tablet    Take 2 tablets (1,000 mg) by mouth 2 times daily (with meals)       STATIN NOT PRESCRIBED (INTENTIONAL)     0 each    Statin not prescribed intentionally due to Intolerance (with supporting documentation of trying a statin at least once within the last 5 years)

## 2017-02-21 ASSESSMENT — ANXIETY QUESTIONNAIRES: GAD7 TOTAL SCORE: 9

## 2017-02-21 ASSESSMENT — PATIENT HEALTH QUESTIONNAIRE - PHQ9: SUM OF ALL RESPONSES TO PHQ QUESTIONS 1-9: 4

## 2017-03-22 ENCOUNTER — OFFICE VISIT (OUTPATIENT)
Dept: FAMILY MEDICINE | Facility: OTHER | Age: 77
End: 2017-03-22
Attending: PHYSICIAN ASSISTANT
Payer: MEDICARE

## 2017-03-22 ENCOUNTER — HOSPITAL ENCOUNTER (OUTPATIENT)
Dept: ULTRASOUND IMAGING | Facility: HOSPITAL | Age: 77
Discharge: HOME | End: 2017-03-22
Attending: PHYSICIAN ASSISTANT | Admitting: PHYSICIAN ASSISTANT
Payer: MEDICARE

## 2017-03-22 VITALS
WEIGHT: 162 LBS | DIASTOLIC BLOOD PRESSURE: 78 MMHG | BODY MASS INDEX: 27.66 KG/M2 | TEMPERATURE: 98.4 F | HEART RATE: 84 BPM | SYSTOLIC BLOOD PRESSURE: 130 MMHG | HEIGHT: 64 IN | OXYGEN SATURATION: 97 %

## 2017-03-22 DIAGNOSIS — I10 ESSENTIAL HYPERTENSION WITH GOAL BLOOD PRESSURE LESS THAN 140/90: ICD-10-CM

## 2017-03-22 PROCEDURE — 93306 TTE W/DOPPLER COMPLETE: CPT | Mod: 26 | Performed by: INTERNAL MEDICINE

## 2017-03-22 PROCEDURE — 99212 OFFICE O/P EST SF 10 MIN: CPT | Performed by: COUNSELOR

## 2017-03-22 PROCEDURE — 93306 TTE W/DOPPLER COMPLETE: CPT | Mod: TC

## 2017-03-22 PROCEDURE — 99213 OFFICE O/P EST LOW 20 MIN: CPT | Performed by: PHYSICIAN ASSISTANT

## 2017-03-22 ASSESSMENT — PAIN SCALES - GENERAL: PAINLEVEL: NO PAIN (0)

## 2017-03-22 NOTE — MR AVS SNAPSHOT
After Visit Summary   3/22/2017    Ena Lanza    MRN: [id number]           Patient Information     Date Of Birth          1940        Visit Information        Provider Department      3/22/2017 1:45 PM Amanda Luu PA [facility]        Today's Diagnoses     Essential hypertension with goal blood pressure less than 140/90          Care Instructions      Thank you for choosing [facility].   I have office hours 8:00 am to 4:30 pm on Monday's, Wednesday's, Thursday's and Friday's. My nurse and I are out of the office every Tuesday.    Following your visit, when your labs and diagnostic testing have returned, I will review then and you will be contacted by my nurse.  If you are on My Chart, you can also view results there.    For refills, notify your pharmacy regarding what you need and the pharmacy will generate a refill request. Do not call my nurse as she is unable to process refill request. Please plan ahead and allow 3-5 days for refill requests.    You will generally receive a reminder call the day prior to your appointment.  If you cannot attend your appointment, please cancel your appointment with as much notice as possible.  If there is a pattern of failure to present for your appointments, I cannot provide consistent, meaningful, ongoing care for you. It is very important to me that you come in for your care, so we can best assist you with your health care needs.    IMPORTANT:  Please note that it is my standard of practice to NOT participate in prescribing ongoing requested Narcotic Analgesic therapy, and/or participate in the prescribing of other controlled substances.  My nurse and I am happy to assist you with the process of referral for alternative pain management as needed, and other treatment modalities including but not limited to:  Physical Therapy, Physical Medicine and Rehab, Counseling, Chiropractic Care, Orthopedic Care, and non-narcotic  "medication management.     In the event that you need to be seen for emergent concerns and I am out of office,  please see one of my colleagues for acute concerns.  You may also present to UC or ER.  I appreciate the opportunity to serve you and look forward to supporting your healthcare needs in the future. Please contact me with any questions or concerns that you may have.    Sincerely,      Amanda Luu RN, PAAndriaC   \        Follow-ups after your visit        Follow-up notes from your care team     Return in about 6 months (around 2017).      Who to contact     If you have questions or need follow up information about today's clinic visit or your schedule please contact [facility] directly at [phone].  Normal or non-critical lab and imaging results will be communicated to you by MyChart, letter or phone within 4 business days after the clinic has received the results. If you do not hear from us within 7 days, please contact the clinic through [employer] or phone. If you have a critical or abnormal lab result, we will notify you by phone as soon as possible.  Submit refill requests through [employer] or call your pharmacy and they will forward the refill request to us. Please allow 3 business days for your refill to be completed.          Additional Information About Your Visit        [employer] Information     [employer] lets you send messages to your doctor, view your test results, renew your prescriptions, schedule appointments and more. To sign up, go to [email or web address] . Click on \"Log in\" on the left side of the screen, which will take you to the Welcome page. Then click on \"Sign up Now\" on the right side of the page.     You will be asked to enter the access code listed below, as well as some personal information. Please follow the directions to create your username and password.     Your access code is: [email or web address]  Expires: 2017 10:15 AM     Your access code will  in 90 days. " "If you need help or a new code, please call your [place] clinic or [phone].        Care EveryWhere ID     This is your Care EveryWhere ID. This could be used by other organizations to access your [place] medical records  [id number]        Your Vitals Were     Pulse Temperature Height Pulse Oximetry BMI (Body Mass Index)       84 98.4  F (36.9  C) (Tympanic) 5' 4\" (1.626 m) 97% 27.81 kg/m2        Blood Pressure from Last 3 Encounters:   03/22/17 130/78   02/20/17 156/84   02/18/17 (!) 186/94    Weight from Last 3 Encounters:   03/22/17 162 lb (73.5 kg)   02/20/17 160 lb 3.2 oz (72.7 kg)   11/28/16 165 lb (74.8 kg)              Today, you had the following     No orders found for display         Today's Medication Changes          These changes are accurate as of: 3/22/17 11:59 PM.  If you have any questions, ask your nurse or doctor.               These medicines have changed or have updated prescriptions.        Dose/Directions    metFORMIN 500 MG 24 hr tablet   Commonly known as:  GLUCOPHAGE-XR   This may have changed:  additional instructions   Used for:  Type 2 diabetes mellitus with hyperglycemia, without long-term current use of insulin (H)        Dose:  1000 mg   Take 2 tablets (1,000 mg) by mouth 2 times daily (with meals)   Quantity:  120 tablet   Refills:  5                Primary Care Provider Office Phone # Fax #    EFREN Salinas [phone]       [address] [address]        Thank you!     Thank you for choosing [facility]  for your care. Our goal is always to provide you with excellent care. Hearing back from our patients is one way we can continue to improve our services. Please take a few minutes to complete the written survey that you may receive in the mail after your visit with us. Thank you!             Your Updated Medication List - Protect others around you: Learn how to safely use, store and throw away your " medicines at www.disposemymeds.org.          This list is accurate as of: 3/22/17 11:59 PM.  Always use your most recent med list.                   Brand Name Dispense Instructions for use    aspirin 81 MG chewable tablet     108 tablet    Take 1 tablet (81 mg) by mouth daily       blood glucose monitoring lancets     102 Box    Use to test blood sugar 2 times daily.       blood glucose monitoring test strip    ACCU-CHEK SMARTVIEW    100 each    Use to test blood sugar 2 times daily or as directed       ibuprofen 800 MG tablet    ADVIL/MOTRIN    90 tablet    Take 1 tablet (800 mg) by mouth every 8 hours as needed for moderate pain       ketoconazole 2 % cream    NIZORAL    60 g    Apply topically 2 times daily       lisinopril 20 MG tablet    PRINIVIL/ZESTRIL    90 tablet    Take 1 tablet (20 mg) by mouth daily       metFORMIN 500 MG 24 hr tablet    GLUCOPHAGE-XR    120 tablet    Take 2 tablets (1,000 mg) by mouth 2 times daily (with meals)       STATIN NOT PRESCRIBED (INTENTIONAL)     0 each    Statin not prescribed intentionally due to Intolerance (with supporting documentation of trying a statin at least once within the last 5 years)

## 2017-03-22 NOTE — NURSING NOTE
"Chief Complaint   Patient presents with     Hypertension     One month follow up       Initial /78  Pulse 84  Temp 98.4  F (36.9  C) (Tympanic)  Ht 5' 4\" (1.626 m)  Wt 162 lb (73.5 kg)  SpO2 97%  BMI 27.81 kg/m2 Estimated body mass index is 27.81 kg/(m^2) as calculated from the following:    Height as of this encounter: 5' 4\" (1.626 m).    Weight as of this encounter: 162 lb (73.5 kg).  Medication Reconciliation: complete       Saloni Dalal      "

## 2017-03-22 NOTE — PROGRESS NOTES
SUBJECTIVE:                                                    Ena Lanza is a 76 year old female who presents to clinic today for the following health issues:    Hypertension Follow-up      Outpatient blood pressures are being checked at home.  Results are highest 157.    Low Salt Diet: not monitoring salt       Amount of exercise or physical activity: 2-3 days/week for an average of 15-30 minutes    Problems taking medications regularly: No    Medication side effects: none    Diet: regular (no restrictions)          Problem list and histories reviewed & adjusted, as indicated.  Additional history: as documented    Patient Active Problem List   Diagnosis     Lesion of sciatic nerve     Hyperlipidemia with target LDL less than 100     HTN (hypertension)     DDD (degenerative disc disease), lumbar     Essential hypertension     Type 2 diabetes mellitus without complication (H)     ACP (advance care planning)     Newly recognized heart murmur     Essential hypertension with goal blood pressure less than 140/90     Herpes zoster with complication     Type 2 diabetes mellitus with hyperglycemia, without long-term current use of insulin (H)     Heart murmur     No past surgical history on file.    Social History   Substance Use Topics     Smoking status: Never Smoker     Smokeless tobacco: Never Used      Comment: yes passive exposure     Alcohol use No     Family History   Problem Relation Age of Onset     C.A.D. Father 71     cause of death     CANCER Father      melanoma     Coronary Artery Disease Father      Thyroid Disease Mother      Hypertension Mother      Thyroid Disease Sister      Other Cancer Sister      melanoma     DIABETES No family hx of      Hyperlipidemia No family hx of      Asthma No family hx of      Breast Cancer No family hx of      Colon Cancer No family hx of          Current Outpatient Prescriptions   Medication Sig Dispense Refill     lisinopril (PRINIVIL/ZESTRIL) 20 MG tablet Take 1  "tablet (20 mg) by mouth daily 30 tablet 1     blood glucose monitoring (ACCU-CHEK FASTCLIX) lancets Use to test blood sugar 2 times daily. 102 Box 5     blood glucose monitoring (ACCU-CHEK SMARTVIEW) test strip Use to test blood sugar 2 times daily or as directed 100 each 5     metFORMIN (GLUCOPHAGE-XR) 500 MG 24 hr tablet Take 2 tablets (1,000 mg) by mouth 2 times daily (with meals) (Patient taking differently: Take 1,000 mg by mouth 2 times daily (with meals) 500 mg in am and 1000 mg in evening) 120 tablet 5     aspirin 81 MG chewable tablet Take 1 tablet (81 mg) by mouth daily 108 tablet 3     STATIN NOT PRESCRIBED, INTENTIONAL, Statin not prescribed intentionally due to Intolerance (with supporting documentation of trying a statin at least once within the last 5 years) 0 each 0     ketoconazole (NIZORAL) 2 % cream Apply topically 2 times daily 60 g 4     ibuprofen (ADVIL,MOTRIN) 800 MG tablet Take 1 tablet (800 mg) by mouth every 8 hours as needed for moderate pain 90 tablet 1     No Known Allergies  BP Readings from Last 3 Encounters:   03/22/17 130/78   02/20/17 156/84   02/18/17 (!) 186/94    Wt Readings from Last 3 Encounters:   03/22/17 162 lb (73.5 kg)   02/20/17 160 lb 3.2 oz (72.7 kg)   11/28/16 165 lb (74.8 kg)                    Reviewed and updated as needed this visit by clinical staff  Tobacco  Allergies  Meds       Reviewed and updated as needed this visit by Provider         ROS:  Constitutional, HEENT, cardiovascular, pulmonary, gi and gu systems are negative, except as otherwise noted.    OBJECTIVE:                                                    /78  Pulse 84  Temp 98.4  F (36.9  C) (Tympanic)  Ht 5' 4\" (1.626 m)  Wt 162 lb (73.5 kg)  SpO2 97%  BMI 27.81 kg/m2  Body mass index is 27.81 kg/(m^2).  GENERAL APPEARANCE: healthy, alert and no distress  EYES: Eyes grossly normal to inspection, PERRL and conjunctivae and sclerae normal  HENT: ear canals and TM's normal and nose and " mouth without ulcers or lesions  NECK: no adenopathy, no asymmetry, masses, or scars and thyroid normal to palpation  RESP: lungs clear to auscultation - no rales, rhonchi or wheezes  CV: regular rates and rhythm, normal S1 S2, no S3 or S4 and 1/6 murmur, click or rub  LYMPHATICS: normal ant/post cervical and supraclavicular nodes  SKIN: no suspicious lesions or rashes  PSYCH: mentation appears normal and affect normal/bright    Diagnostic test results:  Diagnostic Test Results:  Results for orders placed or performed in visit on 02/20/17   *UA reflex to Microscopic and Culture   Result Value Ref Range    Color Urine Yellow     Appearance Urine Clear     Glucose Urine 250 (A) NEG mg/dL    Bilirubin Urine Negative NEG    Ketones Urine Negative NEG mg/dL    Specific Gravity Urine 1.025 1.003 - 1.035    Blood Urine Negative NEG    pH Urine 5.5 5.0 - 7.0 pH    Protein Albumin Urine Trace (A) NEG mg/dL    Urobilinogen Urine 0.2 0.2 - 1.0 EU/dL    Nitrite Urine Negative NEG    Leukocyte Esterase Urine Small (A) NEG    Source Midstream Urine  LESS THAN 5 CC SPECIMEN      Urine Microscopic   Result Value Ref Range    WBC Urine O - 2 0 - 2 /HPF    RBC Urine NONE SEEN 0 - 2 /HPF    Squamous Epithelial /LPF Urine Few FEW /LPF    Bacteria Urine Few (A) NEG /HPF   Echocardiogram    Narrative    ECHOCARDIOGRAM    M-mode, two-dimensional, color-flow, and Doppler studies were obtained  on this patient.  Standard views were utilized.    ORDERING PHYSICIAN:  SEYMOUR Mccloud    INDICATIONS:  Murmur    Cardiac Dimensions                                    Normal  Dimensions  Aortic Root:                            27.3 mm      Aortic Root  Diameter: 20-37 mm  Left Atrium:                            35.5 mm      Left Atrium:  19-40 mm  Right Ventricle:               not measured      Right Ventricle: 7-23  mm  Left Ventricle end-diastole:   54.2 mm      Left Ventricle  end-diastole: 35-56 mm  Left Ventricle end-systole:     36.6 mm       Left Ventricle  end-systole: 35-56 mm  IVS end-diastole:                      9.7 mm      IVS end-diastole:  7-11 mm  LVPW:                                      9.7 mm      LVPW: 7-11 mm    Review of the study shows there is no pericardial effusion.  The left  ventricle has normal size and systolic function. Ejection fraction is  at 60%. Left atrium is normal; volume is 51 cc and indexed it is 29 cc  per meters squared.  The right ventricle and atrium are normal.  Mitral valve has a slight amount of mitral regurgitation.  The aortic  valve has some calcifications but no significant stenosis nor  insufficiency.  There is slight tricuspid regurgitation.  Unable to  estimate the right ventricle systolic pressure.  Diastolic indices  show reversal of the E to A ratio at 0.66, suggesting diastolic  dysfunction.            Continued on page 2        ASSESSMENT:  Echocardiographic study revealing  1.  Normal left ventricular size and systolic function.  Ejection  fraction at 60%.  2.  Normal atrium.  3.  Normal right ventricle size and function.  4.  Slight mitral regurgitation.  5.  Aortic valve with some calcifications and sclerosis, without  stenosis or insufficiency.  6.  Slight tricuspid regurgitation.  Unable to estimate the right  ventricle systolic pressure.  7.  Evidence of diastolic dysfunction is present.  Exam Date: Mar 22, 2017 11:48:00 AM  Author: SANKET SPRINGER  This report is final and signed          ASSESSMENT/PLAN:                                                    1. Essential hypertension with goal blood pressure less than 140/90  Her pressure is much better.  Has diastolic dysfunction.  On appropriate medications at this time recheck in 6 months.  No coughing of significance but will notify if it gets worse.  See us back in 6 months.   - lisinopril (PRINIVIL/ZESTRIL) 20 MG tablet; Take 1 tablet (20 mg) by mouth daily  Dispense: 90 tablet; Refill: 1      See Patient Instructions    Amanda CORBETT  EFREN Luu  [facility]

## 2017-04-02 RX ORDER — LISINOPRIL 20 MG/1
20 TABLET ORAL DAILY
Qty: 90 TABLET | Refills: 1 | COMMUNITY
Start: 2017-03-22 | End: 2017-10-16

## 2017-04-02 NOTE — PATIENT INSTRUCTIONS
Thank you for choosing [facility].   I have office hours 8:00 am to 4:30 pm on Monday's, Wednesday's, Thursday's and Friday's. My nurse and I are out of the office every Tuesday.    Following your visit, when your labs and diagnostic testing have returned, I will review then and you will be contacted by my nurse.  If you are on My Chart, you can also view results there.    For refills, notify your pharmacy regarding what you need and the pharmacy will generate a refill request. Do not call my nurse as she is unable to process refill request. Please plan ahead and allow 3-5 days for refill requests.    You will generally receive a reminder call the day prior to your appointment.  If you cannot attend your appointment, please cancel your appointment with as much notice as possible.  If there is a pattern of failure to present for your appointments, I cannot provide consistent, meaningful, ongoing care for you. It is very important to me that you come in for your care, so we can best assist you with your health care needs.    IMPORTANT:  Please note that it is my standard of practice to NOT participate in prescribing ongoing requested Narcotic Analgesic therapy, and/or participate in the prescribing of other controlled substances.  My nurse and I am happy to assist you with the process of referral for alternative pain management as needed, and other treatment modalities including but not limited to:  Physical Therapy, Physical Medicine and Rehab, Counseling, Chiropractic Care, Orthopedic Care, and non-narcotic medication management.     In the event that you need to be seen for emergent concerns and I am out of office,  please see one of my colleagues for acute concerns.  You may also present to  or ER.  I appreciate the opportunity to serve you and look forward to supporting your healthcare needs in the future. Please contact me with any questions or concerns that you may  have.    Sincerely,      Amanda Luu RN, PA-C   \

## 2017-04-07 ENCOUNTER — TELEPHONE (OUTPATIENT)
Dept: FAMILY MEDICINE | Facility: OTHER | Age: 77
End: 2017-04-07

## 2017-04-07 NOTE — TELEPHONE ENCOUNTER
10:48 AM    Reason for Call: Phone Call    Description: Pt called and states that she was seen over 2 weeks ago and she still has not heard anything back yet about some results of hers, would like a call back regarding this.    Was an appointment offered for this call? No    Preferred method for responding to this message: Telephone [phone]    If we cannot reach you directly, may we leave a detailed response at the number you provided? Yes    Can this message wait until your PCP/provider returns, if available today? Not applicable,     Nina Martinez

## 2017-05-01 DIAGNOSIS — E11.65 TYPE 2 DIABETES MELLITUS WITH HYPERGLYCEMIA, WITHOUT LONG-TERM CURRENT USE OF INSULIN (H): ICD-10-CM

## 2017-05-04 RX ORDER — METFORMIN HCL 500 MG
TABLET, EXTENDED RELEASE 24 HR ORAL
Qty: 360 TABLET | Refills: 0 | Status: SHIPPED | OUTPATIENT
Start: 2017-05-04 | End: 2018-05-07

## 2017-05-22 ENCOUNTER — OFFICE VISIT (OUTPATIENT)
Dept: FAMILY MEDICINE | Facility: OTHER | Age: 77
End: 2017-05-22
Attending: NURSE PRACTITIONER
Payer: MEDICARE

## 2017-05-22 VITALS
HEIGHT: 64 IN | OXYGEN SATURATION: 97 % | DIASTOLIC BLOOD PRESSURE: 94 MMHG | BODY MASS INDEX: 27.83 KG/M2 | SYSTOLIC BLOOD PRESSURE: 132 MMHG | TEMPERATURE: 97.5 F | HEART RATE: 78 BPM | WEIGHT: 163 LBS

## 2017-05-22 DIAGNOSIS — L08.9 TOE INFECTION: Primary | ICD-10-CM

## 2017-05-22 LAB
GRAM STN SPEC: ABNORMAL
MICRO REPORT STATUS: ABNORMAL
SPECIMEN SOURCE: ABNORMAL

## 2017-05-22 PROCEDURE — 10060 I&D ABSCESS SIMPLE/SINGLE: CPT | Performed by: NURSE PRACTITIONER

## 2017-05-22 PROCEDURE — 87205 SMEAR GRAM STAIN: CPT | Mod: ZL | Performed by: NURSE PRACTITIONER

## 2017-05-22 PROCEDURE — 87070 CULTURE OTHR SPECIMN AEROBIC: CPT | Mod: ZL | Performed by: NURSE PRACTITIONER

## 2017-05-22 PROCEDURE — 87186 SC STD MICRODIL/AGAR DIL: CPT | Mod: ZL | Performed by: NURSE PRACTITIONER

## 2017-05-22 PROCEDURE — 99213 OFFICE O/P EST LOW 20 MIN: CPT | Mod: 25 | Performed by: NURSE PRACTITIONER

## 2017-05-22 PROCEDURE — 99000 SPECIMEN HANDLING OFFICE-LAB: CPT | Performed by: NURSE PRACTITIONER

## 2017-05-22 PROCEDURE — 10160 PNXR ASPIR ABSC HMTMA BULLA: CPT | Performed by: NURSE PRACTITIONER

## 2017-05-22 PROCEDURE — 99212 OFFICE O/P EST SF 10 MIN: CPT | Mod: 25

## 2017-05-22 PROCEDURE — 87077 CULTURE AEROBIC IDENTIFY: CPT | Mod: ZL | Performed by: NURSE PRACTITIONER

## 2017-05-22 RX ORDER — SULFAMETHOXAZOLE/TRIMETHOPRIM 800-160 MG
1 TABLET ORAL 2 TIMES DAILY
Qty: 20 TABLET | Refills: 0 | Status: SHIPPED | OUTPATIENT
Start: 2017-05-22 | End: 2017-08-23

## 2017-05-22 ASSESSMENT — PAIN SCALES - GENERAL: PAINLEVEL: NO PAIN (0)

## 2017-05-22 NOTE — PROGRESS NOTES
SUBJECTIVE:                                                    Ena Lanza is a 76 year old female who presents to clinic today for the following health issues:      Musculoskeletal problem/pain      Duration: 5 days    Description  Location: left great toe    Intensity:  moderate    Accompanying signs and symptoms: warmth, redness and discoloration of nail bed - white creamy under skin    History  Previous similar problem: no   Previous evaluation:  none    Precipitating or alleviating factors:  Trauma or overuse: no   Aggravating factors include: none    Therapies tried and outcome: soak in epsom salt, bacitracin            Problem list and histories reviewed & adjusted, as indicated.  Additional history: as documented    Patient Active Problem List   Diagnosis     Lesion of sciatic nerve     Hyperlipidemia with target LDL less than 100     HTN (hypertension)     DDD (degenerative disc disease), lumbar     Essential hypertension     Type 2 diabetes mellitus without complication (H)     ACP (advance care planning)     Newly recognized heart murmur     Essential hypertension with goal blood pressure less than 140/90     Herpes zoster with complication     Type 2 diabetes mellitus with hyperglycemia, without long-term current use of insulin (H)     Heart murmur     No past surgical history on file.    Social History   Substance Use Topics     Smoking status: Never Smoker     Smokeless tobacco: Never Used      Comment: yes passive exposure     Alcohol use No     Family History   Problem Relation Age of Onset     C.A.D. Father 71     cause of death     CANCER Father      melanoma     Coronary Artery Disease Father      Thyroid Disease Mother      Hypertension Mother      Thyroid Disease Sister      Other Cancer Sister      melanoma     DIABETES No family hx of      Hyperlipidemia No family hx of      Asthma No family hx of      Breast Cancer No family hx of      Colon Cancer No family hx of          Current  "Outpatient Prescriptions   Medication Sig Dispense Refill     metFORMIN (GLUCOPHAGE-XR) 500 MG 24 hr tablet TAKE 2 TABLETS BY MOUTH TWICE DAILY WITH MEALS 360 tablet 0     lisinopril (PRINIVIL/ZESTRIL) 20 MG tablet Take 1 tablet (20 mg) by mouth daily 90 tablet 1     blood glucose monitoring (ACCU-CHEK FASTCLIX) lancets Use to test blood sugar 2 times daily. 102 Box 5     blood glucose monitoring (ACCU-CHEK SMARTVIEW) test strip Use to test blood sugar 2 times daily or as directed 100 each 5     aspirin 81 MG chewable tablet Take 1 tablet (81 mg) by mouth daily 108 tablet 3     STATIN NOT PRESCRIBED, INTENTIONAL, Statin not prescribed intentionally due to Intolerance (with supporting documentation of trying a statin at least once within the last 5 years) 0 each 0     ketoconazole (NIZORAL) 2 % cream Apply topically 2 times daily 60 g 4     ibuprofen (ADVIL,MOTRIN) 800 MG tablet Take 1 tablet (800 mg) by mouth every 8 hours as needed for moderate pain 90 tablet 1     No Known Allergies    Reviewed and updated as needed this visit by clinical staff  Tobacco  Allergies       Reviewed and updated as needed this visit by Provider         ROS:  C: NEGATIVE for fever, chills, change in weight  INTEGUMENTARY/SKIN: left great toe - redness, tenderness and puss at the base of the nail bed  E/M: NEGATIVE for ear, mouth and throat problems  R: NEGATIVE for significant cough or SOB  CV: NEGATIVE for chest pain, palpitations or peripheral edema    OBJECTIVE:                                                    BP (!) 132/94 (BP Location: Right arm, Cuff Size: Adult Regular)  Pulse 78  Temp 97.5  F (36.4  C) (Tympanic)  Ht 5' 4\" (1.626 m)  Wt 163 lb (73.9 kg)  SpO2 97%  BMI 27.98 kg/m2  Body mass index is 27.98 kg/(m^2).   GENERAL: healthy, alert and no distress  RESP: lungs clear to auscultation - no rales, rhonchi or wheezes  CV: regular rate and rhythm, normal S1 S2, no S3 or S4, no murmur, click or rub, no peripheral " edema and peripheral pulses strong  SKIN: left great toe noted to have redness, swelling and white/creamy discharge at the nail bed.  PSYCH: mentation appears normal, affect normal/bright    Diagnostic Test Results:  Left great toe culture sent     ASSESSMENT:                                                    Left great toe with verbal consent from patient area clean, # 9 blade used to puncture site at nail bed collected culture and sent to lab. Drainage was a small amount of purulent non-bloody drainage. Bacitracin applied and Band-Aid to cover site.     PLAN:                                                    ASSESSMENT / PLAN:  (L08.9) Toe infection  (primary encounter diagnosis)  Comment:   Plan:  Wound Culture Aerobic Bacterial, Gram stain,          sulfamethoxazole-trimethoprim (BACTRIM DS/SEPTRA DS) 800-160 MG per tablet,    Puncture (I&D)Drainage of Lesion/Cyst  [65684]   Can continue with daily soaks and bacitracin application             Follow up with primary care provider next week.  Follow up sooner if noted signs or symptoms of infection      ESTEFANY Dejesus CNP  [facility]

## 2017-05-22 NOTE — PATIENT INSTRUCTIONS
Wound Check, Infection  You have a wound that has become infected. The wound will not heal properly unless the infection is cleared. Infection in a wound may also spread if it is not treated. In most cases, antibiotic medicines are prescribed to treat a wound infection.   Symptoms of a wound infection include:    Redness or swelling around the wound    Warmth coming from the wound    New or worsening pain    Red streaks around the wound    Draining pus    Fever  Home care  Follow all directions you are given to treat the infection.  Medicines  Take all medicines as prescribed.     If you were given antibiotics, take them until they are gone or your healthcare provider tells you to stop. It is vital to finish the antibiotics even if you feel better. If you do not finish them, the infection may come back and be harder to treat.    If your infection is not responding to the medicines you are taking, you may be prescribed new medicines.    Take medicine for pain as directed by your healthcare provider.  Wound care  Care for your wound as directed by your healthcare provider.    Apply a warm compress (clean cloth soaked in hot water) to the infected area for about 5 to 10 minutes at a time. Be very careful not to burn yourself. Test the cloth on a non-infected area to make sure it is not too hot.    Continue to change the dressing daily. If it becomes wet, stained with wound fluid, or dirty, change it sooner. To change it:    Wash your hands with soap and water before changing the dressing.    Carefully remove the dressing and tape. If it sticks to the wound, you may need to wet it a little to remove it. (Do not do this if your healthcare provider has told you not to.)    Gently clean the wound with clean water (or saline) using gauze, a clean washcloth, or cotton swab.    Do not use soap, alcohol, peroxide or other cleansers.    If you were told to dry the wound before putting on a new dressing, gently pat. Do not  rub.    Throw out the old dressing.    Wash your hands again before opening the new, clean dressing.    Wash your hands again when you are done.  Follow-up care  Follow up with your healthcare provider as advised. If a culture was done, you will be notified if your treatment needs to change. Call as directed for the results.  When to seek medical advice  Call your health care provider right away if any of these occur:    Symptoms of infection don't start to improve within 2 days of starting antibiotics    Symptoms of infection get worse    New symptoms, such as red streaks around the wound    Fever of 100.4 F (38.0 C) or higher for more than 2 days after starting the antibiotics    3582-1189 [employer]. [address] [place] [address], [place] [address]. All rights reserved. This information is not intended as a substitute for professional medical care. Always follow your healthcare professional's instructions.

## 2017-05-22 NOTE — NURSING NOTE
"Chief Complaint   Patient presents with     Musculoskeletal Problem     infected toe       Initial BP (!) 132/94 (BP Location: Right arm, Cuff Size: Adult Regular)  Pulse 78  Temp 97.5  F (36.4  C) (Tympanic)  Ht 5' 4\" (1.626 m)  Wt 163 lb (73.9 kg)  SpO2 97%  BMI 27.98 kg/m2 Estimated body mass index is 27.98 kg/(m^2) as calculated from the following:    Height as of this encounter: 5' 4\" (1.626 m).    Weight as of this encounter: 163 lb (73.9 kg).  Medication Reconciliation: complete   Reina Turcios LPN      "

## 2017-05-22 NOTE — MR AVS SNAPSHOT
After Visit Summary   5/22/2017    Ena Lanza    MRN: [id number]           Patient Information     Date Of Birth          1940        Visit Information        Provider Department      5/22/2017 10:20 AM Milagro Florence APRN [facility]        Today's Diagnoses     Toe infection    -  1      Care Instructions      Wound Check, Infection  You have a wound that has become infected. The wound will not heal properly unless the infection is cleared. Infection in a wound may also spread if it is not treated. In most cases, antibiotic medicines are prescribed to treat a wound infection.   Symptoms of a wound infection include:    Redness or swelling around the wound    Warmth coming from the wound    New or worsening pain    Red streaks around the wound    Draining pus    Fever  Home care  Follow all directions you are given to treat the infection.  Medicines  Take all medicines as prescribed.     If you were given antibiotics, take them until they are gone or your healthcare provider tells you to stop. It is vital to finish the antibiotics even if you feel better. If you do not finish them, the infection may come back and be harder to treat.    If your infection is not responding to the medicines you are taking, you may be prescribed new medicines.    Take medicine for pain as directed by your healthcare provider.  Wound care  Care for your wound as directed by your healthcare provider.    Apply a warm compress (clean cloth soaked in hot water) to the infected area for about 5 to 10 minutes at a time. Be very careful not to burn yourself. Test the cloth on a non-infected area to make sure it is not too hot.    Continue to change the dressing daily. If it becomes wet, stained with wound fluid, or dirty, change it sooner. To change it:    Wash your hands with soap and water before changing the dressing.    Carefully remove the dressing and tape. If it sticks to the wound, you  may need to wet it a little to remove it. (Do not do this if your healthcare provider has told you not to.)    Gently clean the wound with clean water (or saline) using gauze, a clean washcloth, or cotton swab.    Do not use soap, alcohol, peroxide or other cleansers.    If you were told to dry the wound before putting on a new dressing, gently pat. Do not rub.    Throw out the old dressing.    Wash your hands again before opening the new, clean dressing.    Wash your hands again when you are done.  Follow-up care  Follow up with your healthcare provider as advised. If a culture was done, you will be notified if your treatment needs to change. Call as directed for the results.  When to seek medical advice  Call your health care provider right away if any of these occur:    Symptoms of infection don't start to improve within 2 days of starting antibiotics    Symptoms of infection get worse    New symptoms, such as red streaks around the wound    Fever of 100.4 F (38.0 C) or higher for more than 2 days after starting the antibiotics    2873-3737 [employer]. [address] [place] [address]. All rights reserved. This information is not intended as a substitute for professional medical care. Always follow your healthcare professional's instructions.              Follow-ups after your visit        Follow-up notes from your care team     Return in about 1 week (around 5/29/2017) for wound check.      Who to contact     If you have questions or need follow up information about today's clinic visit or your schedule please contact [facility] directly at [phone].  Normal or non-critical lab and imaging results will be communicated to you by MyChart, letter or phone within 4 business days after the clinic has received the results. If you do not hear from us within 7 days, please contact the clinic through MyChart or phone. If you have a critical or abnormal lab result, we will notify  "you by phone as soon as possible.  Submit refill requests through [employer] or call your pharmacy and they will forward the refill request to us. Please allow 3 business days for your refill to be completed.          Additional Information About Your Visit        [employer] Information     [employer] lets you send messages to your doctor, view your test results, renew your prescriptions, schedule appointments and more. To sign up, go to [email or web address] . Click on \"Log in\" on the left side of the screen, which will take you to the Welcome page. Then click on \"Sign up Now\" on the right side of the page.     You will be asked to enter the access code listed below, as well as some personal information. Please follow the directions to create your username and password.     Your access code is: [email or web address]  Expires: 2017 10:15 AM     Your access code will  in 90 days. If you need help or a new code, please call your [facility] clinic or [phone].        Care EveryWhere ID     This is your Care EveryWhere ID. This could be used by other organizations to access your [facility] medical records  [id number]        Your Vitals Were     Pulse Temperature Height Pulse Oximetry BMI (Body Mass Index)       78 97.5  F (36.4  C) (Tympanic) 5' 4\" (1.626 m) 97% 27.98 kg/m2        Blood Pressure from Last 3 Encounters:   17 (!) 132/94   17 130/78   17 156/84    Weight from Last 3 Encounters:   17 163 lb (73.9 kg)   17 162 lb (73.5 kg)   17 160 lb 3.2 oz (72.7 kg)              We Performed the Following     Gram stain     Wound Culture Aerobic Bacterial          Today's Medication Changes          These changes are accurate as of: 17 10:50 AM.  If you have any questions, ask your nurse or doctor.               Start taking these medicines.        Dose/Directions    sulfamethoxazole-trimethoprim 800-160 MG per tablet   Commonly known as:  BACTRIM DS/SEPTRA DS   Used for:  Toe infection "   Started by:  Milagro Florence APRN CNP        Dose:  1 tablet   Take 1 tablet by mouth 2 times daily   Quantity:  20 tablet   Refills:  0            Where to get your medicines      These medications were sent to [id number] - [place], [place] - [address] [place] [facility] [address], [place] [address]     Phone:  [phone]     sulfamethoxazole-trimethoprim 800-160 MG per tablet                Primary Care Provider Office Phone # Fax #    EFREN Salinas [phone]       [facility] [address]  [facility] [place] [address]        Thank you!     Thank you for choosing [facility]  for your care. Our goal is always to provide you with excellent care. Hearing back from our patients is one way we can continue to improve our services. Please take a few minutes to complete the written survey that you may receive in the mail after your visit with us. Thank you!             Your Updated Medication List - Protect others around you: Learn how to safely use, store and throw away your medicines at www.disposemymeds.org.          This list is accurate as of: 5/22/17 10:50 AM.  Always use your most recent med list.                   Brand Name Dispense Instructions for use    aspirin 81 MG chewable tablet     108 tablet    Take 1 tablet (81 mg) by mouth daily       blood glucose monitoring lancets     102 Box    Use to test blood sugar 2 times daily.       blood glucose monitoring test strip    ACCU-CHEK SMARTVIEW    100 each    Use to test blood sugar 2 times daily or as directed       ibuprofen 800 MG tablet    ADVIL/MOTRIN    90 tablet    Take 1 tablet (800 mg) by mouth every 8 hours as needed for moderate pain       ketoconazole 2 % cream    NIZORAL    60 g    Apply topically 2 times daily       lisinopril 20 MG tablet    PRINIVIL/ZESTRIL    90 tablet    Take 1 tablet (20 mg) by mouth daily       metFORMIN 500 MG 24 hr tablet     GLUCOPHAGE-XR    360 tablet    TAKE 2 TABLETS BY MOUTH TWICE DAILY WITH MEALS       STATIN NOT PRESCRIBED (INTENTIONAL)     0 each    Statin not prescribed intentionally due to Intolerance (with supporting documentation of trying a statin at least once within the last 5 years)       sulfamethoxazole-trimethoprim 800-160 MG per tablet    BACTRIM DS/SEPTRA DS    20 tablet    Take 1 tablet by mouth 2 times daily

## 2017-05-24 DIAGNOSIS — Z53.9 ERRONEOUS ENCOUNTER--DISREGARD: Primary | ICD-10-CM

## 2017-05-24 LAB
BACTERIA SPEC CULT: ABNORMAL
MICRO REPORT STATUS: ABNORMAL
MICROORGANISM SPEC CULT: ABNORMAL
SPECIMEN SOURCE: ABNORMAL

## 2017-05-31 DIAGNOSIS — M54.50 LUMBAGO: ICD-10-CM

## 2017-05-31 RX ORDER — IBUPROFEN 800 MG/1
800 TABLET, FILM COATED ORAL EVERY 8 HOURS PRN
Qty: 90 TABLET | Refills: 1 | Status: SHIPPED | OUTPATIENT
Start: 2017-05-31 | End: 2019-08-23

## 2017-05-31 NOTE — TELEPHONE ENCOUNTER
Ibuprofen      Last Written Prescription Date: 2/9/16  Last Fill Quantity: 90,  # refills: 1   Last Office Visit with G, P or [facility] prescribing provider: 5/22/17

## 2017-08-21 DIAGNOSIS — E11.65 TYPE 2 DIABETES MELLITUS WITH HYPERGLYCEMIA, WITHOUT LONG-TERM CURRENT USE OF INSULIN (H): ICD-10-CM

## 2017-08-21 LAB
EST. AVERAGE GLUCOSE BLD GHB EST-MCNC: 120 MG/DL
HBA1C MFR BLD: 5.8 % (ref 4.3–6)

## 2017-08-21 PROCEDURE — 83036 HEMOGLOBIN GLYCOSYLATED A1C: CPT | Mod: ZL | Performed by: PHYSICIAN ASSISTANT

## 2017-08-21 PROCEDURE — 36415 COLL VENOUS BLD VENIPUNCTURE: CPT | Mod: ZL | Performed by: PHYSICIAN ASSISTANT

## 2017-08-21 PROCEDURE — 40000788 ZZHCL STATISTIC ESTIMATED AVERAGE GLUCOSE: Mod: ZL | Performed by: PHYSICIAN ASSISTANT

## 2017-08-23 ENCOUNTER — OFFICE VISIT (OUTPATIENT)
Dept: FAMILY MEDICINE | Facility: OTHER | Age: 77
End: 2017-08-23
Attending: PHYSICIAN ASSISTANT
Payer: COMMERCIAL

## 2017-08-23 VITALS
BODY MASS INDEX: 27.6 KG/M2 | TEMPERATURE: 98.6 F | SYSTOLIC BLOOD PRESSURE: 128 MMHG | HEIGHT: 62 IN | OXYGEN SATURATION: 98 % | HEART RATE: 78 BPM | DIASTOLIC BLOOD PRESSURE: 86 MMHG | WEIGHT: 150 LBS

## 2017-08-23 DIAGNOSIS — I10 ESSENTIAL HYPERTENSION: ICD-10-CM

## 2017-08-23 DIAGNOSIS — E78.5 HYPERLIPIDEMIA WITH TARGET LDL LESS THAN 100: ICD-10-CM

## 2017-08-23 DIAGNOSIS — E11.9 TYPE 2 DIABETES MELLITUS WITHOUT COMPLICATION, WITHOUT LONG-TERM CURRENT USE OF INSULIN (H): Primary | ICD-10-CM

## 2017-08-23 DIAGNOSIS — Z71.89 ACP (ADVANCE CARE PLANNING): Chronic | ICD-10-CM

## 2017-08-23 DIAGNOSIS — I35.8 AORTIC VALVE SCLEROSIS: ICD-10-CM

## 2017-08-23 PROCEDURE — 99212 OFFICE O/P EST SF 10 MIN: CPT

## 2017-08-23 PROCEDURE — 99214 OFFICE O/P EST MOD 30 MIN: CPT | Performed by: PHYSICIAN ASSISTANT

## 2017-08-23 ASSESSMENT — ANXIETY QUESTIONNAIRES
2. NOT BEING ABLE TO STOP OR CONTROL WORRYING: SEVERAL DAYS
IF YOU CHECKED OFF ANY PROBLEMS ON THIS QUESTIONNAIRE, HOW DIFFICULT HAVE THESE PROBLEMS MADE IT FOR YOU TO DO YOUR WORK, TAKE CARE OF THINGS AT HOME, OR GET ALONG WITH OTHER PEOPLE: NOT DIFFICULT AT ALL
1. FEELING NERVOUS, ANXIOUS, OR ON EDGE: SEVERAL DAYS
GAD7 TOTAL SCORE: 5
7. FEELING AFRAID AS IF SOMETHING AWFUL MIGHT HAPPEN: NOT AT ALL
5. BEING SO RESTLESS THAT IT IS HARD TO SIT STILL: SEVERAL DAYS
6. BECOMING EASILY ANNOYED OR IRRITABLE: NOT AT ALL
4. TROUBLE RELAXING: SEVERAL DAYS
3. WORRYING TOO MUCH ABOUT DIFFERENT THINGS: SEVERAL DAYS

## 2017-08-23 ASSESSMENT — PAIN SCALES - GENERAL: PAINLEVEL: NO PAIN (0)

## 2017-08-23 ASSESSMENT — PATIENT HEALTH QUESTIONNAIRE - PHQ9: SUM OF ALL RESPONSES TO PHQ QUESTIONS 1-9: 0

## 2017-08-23 NOTE — PROGRESS NOTES
SUBJECTIVE:   Ena Lanza is a 77 year old female who presents to clinic today for the following health issues:        Diabetes Follow-up    Patient is checking blood sugars: once daily.  Results are as follows:       am - 100s to 120s     Diabetic concerns: None     Symptoms of hypoglycemia (low blood sugar): none     Paresthesias (numbness or burning in feet) or sores: No     Date of last diabetic eye exam: DUE     Hyperlipidemia Follow-Up      Rate your low fat/cholesterol diet?: good    Taking statin?  No    Other lipid medications/supplements?:  none    Hypertension Follow-up      Outpatient blood pressures are not being checked.  Low Salt Diet: no added salt      Amount of exercise or physical activity: None    Problems taking medications regularly: No    Medication side effects: none  Diet: regular (no restrictions)          Problem list and histories reviewed & adjusted, as indicated.  Additional history: as documented    Patient Active Problem List   Diagnosis     Lesion of sciatic nerve     Hyperlipidemia with target LDL less than 100     HTN (hypertension)     DDD (degenerative disc disease), lumbar     Essential hypertension     Type 2 diabetes mellitus without complication (H)     ACP (advance care planning)     Newly recognized heart murmur     Essential hypertension with goal blood pressure less than 140/90     Herpes zoster with complication     Type 2 diabetes mellitus with hyperglycemia, without long-term current use of insulin (H)     Heart murmur     No past surgical history on file.    Social History   Substance Use Topics     Smoking status: Never Smoker     Smokeless tobacco: Never Used      Comment: yes passive exposure     Alcohol use No     Family History   Problem Relation Age of Onset     C.A.D. Father 71     cause of death     CANCER Father      melanoma     Coronary Artery Disease Father      Thyroid Disease Mother      Hypertension Mother      Thyroid Disease Sister      Other  Cancer Sister      melanoma     DIABETES No family hx of      Hyperlipidemia No family hx of      Asthma No family hx of      Breast Cancer No family hx of      Colon Cancer No family hx of          Current Outpatient Prescriptions   Medication Sig Dispense Refill     ibuprofen (ADVIL/MOTRIN) 800 MG tablet Take 1 tablet (800 mg) by mouth every 8 hours as needed for moderate pain 90 tablet 1     metFORMIN (GLUCOPHAGE-XR) 500 MG 24 hr tablet TAKE 2 TABLETS BY MOUTH TWICE DAILY WITH MEALS 360 tablet 0     lisinopril (PRINIVIL/ZESTRIL) 20 MG tablet Take 1 tablet (20 mg) by mouth daily 90 tablet 1     blood glucose monitoring (ACCU-CHEK FASTCLIX) lancets Use to test blood sugar 2 times daily. 102 Box 5     blood glucose monitoring (ACCU-CHEK SMARTVIEW) test strip Use to test blood sugar 2 times daily or as directed 100 each 5     aspirin 81 MG chewable tablet Take 1 tablet (81 mg) by mouth daily 108 tablet 3     STATIN NOT PRESCRIBED, INTENTIONAL, Statin not prescribed intentionally due to Intolerance (with supporting documentation of trying a statin at least once within the last 5 years) 0 each 0     No Known Allergies  Recent Labs   Lab Test  08/21/17   0844  02/18/17   1806  01/19/17   0916  06/13/16   1050  10/21/15   1008  01/26/15   1348   02/10/14   0945   A1C  5.8   --   6.8*  6.7*  6.4*   --    < >  12.9*   LDL   --    --   107*  110*  143*   --    < >   --    HDL   --    --   69  67  73   --    < >   --    TRIG   --    --   168*  202*  199*   --    < >   --    ALT   --    --    --   18  27  34   --   27   CR   --   0.78  0.74  0.70  0.65  0.57   < >  0.70   GFRESTIMATED   --   72  77  81  89  >90  Non  GFR Calc     < >  82   GFRESTBLACK   --   88  >90   GFR Calc    >90   GFR Calc    >90  >90  African American GFR Calc     < >  >90   POTASSIUM   --   3.7  3.5  3.6  3.9  3.5   < >  3.8   TSH   --    --    --   1.03   --    --    --   1.58    < > = values in this  "interval not displayed.      BP Readings from Last 3 Encounters:   08/23/17 128/86   05/22/17 (!) 132/94   03/22/17 130/78    Wt Readings from Last 3 Encounters:   08/23/17 150 lb (68 kg)   05/22/17 163 lb (73.9 kg)   03/22/17 162 lb (73.5 kg)                          Reviewed and updated as needed this visit by clinical staff     Reviewed and updated as needed this visit by Provider         ROS:  Constitutional, neuro, ENT, endocrine, pulmonary, cardiac, gastrointestinal, genitourinary, musculoskeletal, integument and psychiatric systems are negative, except as otherwise noted.      OBJECTIVE:                                                    /86 (BP Location: Left arm, Patient Position: Chair, Cuff Size: Adult Regular)  Pulse 78  Temp 98.6  F (37  C) (Tympanic)  Ht 5' 2\" (1.575 m)  Wt 150 lb (68 kg)  SpO2 98%  Breastfeeding? No  BMI 27.44 kg/m2  Body mass index is 27.44 kg/(m^2).  GENERAL APPEARANCE: healthy, alert and no distress  EYES: Eyes grossly normal to inspection, PERRL and conjunctivae and sclerae normal  HENT: ear canals and TM's normal and nose and mouth without ulcers or lesions  NECK: no adenopathy, no asymmetry, masses, or scars and thyroid normal to palpation  RESP: lungs clear to auscultation - no rales, rhonchi or wheezes  CV: regular rates and rhythm, normal S1 S2, no S3 or S4 and 2/6 murmur LSB with slight click,   LYMPHATICS: normal ant/post cervical and supraclavicular nodes  ABDOMEN: soft, nontender, without hepatosplenomegaly or masses and bowel sounds normal  MS: extremities normal- no gross deformities noted  SKIN: no suspicious lesions or rashes  NEURO: Normal strength and tone, mentation intact and speech normal  DIABETIC FOOT EXAM: normal DP and PT pulses, no trophic changes or ulcerative lesions and normal sensory exam  PSYCH: mentation appears normal and affect normal/bright    Diagnostic test results:  Diagnostic Test Results:  No results found for this or any previous " visit (from the past 24 hour(s)).  Results for orders placed or performed in visit on 08/21/17   Hemoglobin A1c   Result Value Ref Range    Hemoglobin A1C 5.8 4.3 - 6.0 %   Estimated Average Glucose   Result Value Ref Range    Estimated Average Glucose 120 mg/dL          ASSESSMENT/PLAN:                                                    1. ACP (advance care planning)  Given and completed placed on file today.     2. Type 2 diabetes mellitus without complication, without long-term current use of insulin (H)  Doing so much better with weight loss and her new teeth.   She has had no low sugars.     3. Essential hypertension  Tolerated lisinopril without singh coughing.  Labs are up to date.     4. Hyperlipidemia with target LDL less than 100  Given her pass on medication due to reactions from this.  Good diet and exercise.  Risks are reviewed.     5. Aortic valve sclerosis  Echo was done on 3/17.   She is not SOB or dizzy or other sx.           See Patient Instructions    EFREN Troy  [facility]

## 2017-08-23 NOTE — PATIENT INSTRUCTIONS
Thank you for choosing [facility].   I have office hours 8:00 am to 4:30 pm on Monday's, Wednesday's, Thursday's and Friday's. My nurse and I are out of the office every Tuesday.    Following your visit, when your labs and diagnostic testing have returned, I will review then and you will be contacted by my nurse.  If you are on My Chart, you can also view results there.    For refills, notify your pharmacy regarding what you need and the pharmacy will generate a refill request. Do not call my nurse as she is unable to process refill request. Please plan ahead and allow 3-5 days for refill requests.    You will generally receive a reminder call the day prior to your appointment.  If you cannot attend your appointment, please cancel your appointment with as much notice as possible.  If there is a pattern of failure to present for your appointments, I cannot provide consistent, meaningful, ongoing care for you. It is very important to me that you come in for your care, so we can best assist you with your health care needs.    IMPORTANT:  Please note that it is my standard of practice to NOT participate in prescribing ongoing requested Narcotic Analgesic therapy, and/or participate in the prescribing of other controlled substances.  My nurse and I am happy to assist you with the process of referral for alternative pain management as needed, and other treatment modalities including but not limited to:  Physical Therapy, Physical Medicine and Rehab, Counseling, Chiropractic Care, Orthopedic Care, and non-narcotic medication management.     In the event that you need to be seen for emergent concerns and I am out of office,  please see one of my colleagues for acute concerns.  You may also present to  or ER.  I appreciate the opportunity to serve you and look forward to supporting your healthcare needs in the future. Please contact me with any questions or concerns that you may  have.    Sincerely,      Amanda Luu RN, PA-C

## 2017-08-23 NOTE — MR AVS SNAPSHOT
After Visit Summary   8/23/2017    Ena Lanza    MRN: [id number]           Patient Information     Date Of Birth          1940        Visit Information        Provider Department      8/23/2017 1:45 PM Amanda Luu PA [facility]        Today's Diagnoses     Type 2 diabetes mellitus without complication, without long-term current use of insulin (H)    -  1    ACP (advance care planning)        Essential hypertension        Hyperlipidemia with target LDL less than 100        Aortic valve sclerosis          Care Instructions      Thank you for choosing [facility].   I have office hours 8:00 am to 4:30 pm on Monday's, Wednesday's, Thursday's and Friday's. My nurse and I are out of the office every Tuesday.    Following your visit, when your labs and diagnostic testing have returned, I will review then and you will be contacted by my nurse.  If you are on My Chart, you can also view results there.    For refills, notify your pharmacy regarding what you need and the pharmacy will generate a refill request. Do not call my nurse as she is unable to process refill request. Please plan ahead and allow 3-5 days for refill requests.    You will generally receive a reminder call the day prior to your appointment.  If you cannot attend your appointment, please cancel your appointment with as much notice as possible.  If there is a pattern of failure to present for your appointments, I cannot provide consistent, meaningful, ongoing care for you. It is very important to me that you come in for your care, so we can best assist you with your health care needs.    IMPORTANT:  Please note that it is my standard of practice to NOT participate in prescribing ongoing requested Narcotic Analgesic therapy, and/or participate in the prescribing of other controlled substances.  My nurse and I am happy to assist you with the process of referral for alternative pain management as needed,  "and other treatment modalities including but not limited to:  Physical Therapy, Physical Medicine and Rehab, Counseling, Chiropractic Care, Orthopedic Care, and non-narcotic medication management.     In the event that you need to be seen for emergent concerns and I am out of office,  please see one of my colleagues for acute concerns.  You may also present to  or ER.  I appreciate the opportunity to serve you and look forward to supporting your healthcare needs in the future. Please contact me with any questions or concerns that you may have.    Sincerely,      Amanda Luu RN, PA-C               Follow-ups after your visit        Who to contact     If you have questions or need follow up information about today's clinic visit or your schedule please contact [facility] directly at [phone].  Normal or non-critical lab and imaging results will be communicated to you by MyChart, letter or phone within 4 business days after the clinic has received the results. If you do not hear from us within 7 days, please contact the clinic through MyChart or phone. If you have a critical or abnormal lab result, we will notify you by phone as soon as possible.  Submit refill requests through [facility] or call your pharmacy and they will forward the refill request to us. Please allow 3 business days for your refill to be completed.          Additional Information About Your Visit        Care EveryWhere ID     This is your Care EveryWhere ID. This could be used by other organizations to access your [place] medical records  [id number]        Your Vitals Were     Pulse Temperature Height Pulse Oximetry Breastfeeding? BMI (Body Mass Index)    78 98.6  F (37  C) (Tympanic) 5' 2\" (1.575 m) 98% No 27.44 kg/m2       Blood Pressure from Last 3 Encounters:   08/23/17 128/86   05/22/17 (!) 132/94   03/22/17 130/78    Weight from Last 3 Encounters:   08/23/17 150 lb (68 kg)   05/22/17 163 lb (73.9 kg)   03/22/17 162 lb " (73.5 kg)              Today, you had the following     No orders found for display       Primary Care Provider Office Phone # Fax #    EFREN Salinas [phone]       [facility] [address]  [place] [address]        Equal Access to Services     MOISES FRANCO : Hadii aad ku hadasho Soomaali, waaxda luqadaha, qaybta kaalmada adeegyada, waxay idiin hayaan adeeg kharash lakenya . So [facility] [phone].    ATENCIÓN: Si habla español, tiene a pugh disposición servicios gratuitos de asistencia lingüística. Jude al [phone].    We comply with applicable federal civil rights laws and [place] laws. We do not discriminate on the basis of race, color, national origin, age, disability sex, sexual orientation or gender identity.            Thank you!     Thank you for choosing [facility]  for your care. Our goal is always to provide you with excellent care. Hearing back from our patients is one way we can continue to improve our services. Please take a few minutes to complete the written survey that you may receive in the mail after your visit with us. Thank you!             Your Updated Medication List - Protect others around you: Learn how to safely use, store and throw away your medicines at www.disposemymeds.org.          This list is accurate as of: 8/23/17  1:51 PM.  Always use your most recent med list.                   Brand Name Dispense Instructions for use Diagnosis    aspirin 81 MG chewable tablet     108 tablet    Take 1 tablet (81 mg) by mouth daily        blood glucose monitoring lancets     102 Box    Use to test blood sugar 2 times daily.    Type 2 diabetes mellitus with hyperglycemia, without long-term current use of insulin (H)       blood glucose monitoring test strip    ACCU-CHEK SMARTVIEW    100 each    Use to test blood sugar 2 times daily or as directed    Type 2 diabetes mellitus with hyperglycemia, without long-term current use of insulin  (H)       ibuprofen 800 MG tablet    ADVIL/MOTRIN    90 tablet    Take 1 tablet (800 mg) by mouth every 8 hours as needed for moderate pain    Lumbago       lisinopril 20 MG tablet    PRINIVIL/ZESTRIL    90 tablet    Take 1 tablet (20 mg) by mouth daily    Essential hypertension with goal blood pressure less than 140/90       metFORMIN 500 MG 24 hr tablet    GLUCOPHAGE-XR    360 tablet    TAKE 2 TABLETS BY MOUTH TWICE DAILY WITH MEALS    Type 2 diabetes mellitus with hyperglycemia, without long-term current use of insulin (H)       STATIN NOT PRESCRIBED (INTENTIONAL)     0 each    Statin not prescribed intentionally due to Intolerance (with supporting documentation of trying a statin at least once within the last 5 years)    Type 2 diabetes mellitus without complication (H), Hyperlipidemia with target LDL less than 100

## 2017-08-23 NOTE — NURSING NOTE
"Chief Complaint   Patient presents with     Hypertension     Diabetes     Lipids     *_* Health Care Directive *_*     copy requested        Initial /86 (BP Location: Left arm, Patient Position: Chair, Cuff Size: Adult Regular)  Pulse 78  Temp 98.6  F (37  C) (Tympanic)  Ht 5' 2\" (1.575 m)  Wt 150 lb (68 kg)  SpO2 98%  Breastfeeding? No  BMI 27.44 kg/m2 Estimated body mass index is 27.44 kg/(m^2) as calculated from the following:    Height as of this encounter: 5' 2\" (1.575 m).    Weight as of this encounter: 150 lb (68 kg).  Medication Reconciliation: complete   Yari Barber CMA(AAMA)   "

## 2017-08-24 ASSESSMENT — ANXIETY QUESTIONNAIRES: GAD7 TOTAL SCORE: 5

## 2017-09-14 ENCOUNTER — TELEPHONE (OUTPATIENT)
Dept: FAMILY MEDICINE | Facility: OTHER | Age: 77
End: 2017-09-14

## 2017-09-14 NOTE — TELEPHONE ENCOUNTER
Please offer 315 appt with Amanda Luu tomorrow if the person it is being held for does not call back. Otherwise will need first opening with amanda or see another provider.  Ashly Lima LPN

## 2017-09-14 NOTE — TELEPHONE ENCOUNTER
3:48 PM    Reason for Call: OVERBOOK    Patient is having the following symptoms: Possible lymes disease states that is what she believes it is.    The patient is requesting an appointment for (09/15/17) with Amanda Luu.    Was an appointment offered for this call? No  If yes : Appointment type              Date    Preferred method for responding to this message: Telephone Call  What is your phone number ?    If we cannot reach you directly, may we leave a detailed response at the number you provided? Yes    Can this message wait until your PCP/provider returns, if unavailable today? Not applicable,     Nina Martinez

## 2017-10-04 ENCOUNTER — OFFICE VISIT (OUTPATIENT)
Dept: FAMILY MEDICINE | Facility: OTHER | Age: 77
End: 2017-10-04
Attending: PHYSICIAN ASSISTANT
Payer: COMMERCIAL

## 2017-10-04 VITALS
OXYGEN SATURATION: 99 % | TEMPERATURE: 97.6 F | HEIGHT: 64 IN | SYSTOLIC BLOOD PRESSURE: 134 MMHG | RESPIRATION RATE: 18 BRPM | BODY MASS INDEX: 26.63 KG/M2 | HEART RATE: 71 BPM | DIASTOLIC BLOOD PRESSURE: 78 MMHG | WEIGHT: 156 LBS

## 2017-10-04 DIAGNOSIS — L30.9 ECZEMA OF BOTH HANDS: Primary | ICD-10-CM

## 2017-10-04 DIAGNOSIS — Z23 NEED FOR PROPHYLACTIC VACCINATION AND INOCULATION AGAINST INFLUENZA: ICD-10-CM

## 2017-10-04 PROCEDURE — 99213 OFFICE O/P EST LOW 20 MIN: CPT | Performed by: PHYSICIAN ASSISTANT

## 2017-10-04 PROCEDURE — G0008 ADMIN INFLUENZA VIRUS VAC: HCPCS | Performed by: PHYSICIAN ASSISTANT

## 2017-10-04 PROCEDURE — 99212 OFFICE O/P EST SF 10 MIN: CPT

## 2017-10-04 PROCEDURE — 90662 IIV NO PRSV INCREASED AG IM: CPT | Performed by: PHYSICIAN ASSISTANT

## 2017-10-04 RX ORDER — HYDROXYZINE HYDROCHLORIDE 25 MG/1
TABLET, FILM COATED ORAL
Qty: 60 TABLET | Refills: 1 | Status: SHIPPED | OUTPATIENT
Start: 2017-10-04 | End: 2018-04-19

## 2017-10-04 RX ORDER — CEPHALEXIN 500 MG/1
500 CAPSULE ORAL 3 TIMES DAILY
Qty: 30 CAPSULE | Refills: 0 | Status: SHIPPED | OUTPATIENT
Start: 2017-10-04 | End: 2018-04-19

## 2017-10-04 RX ORDER — MUPIROCIN 20 MG/G
OINTMENT TOPICAL 3 TIMES DAILY
Qty: 30 G | Refills: 1 | Status: SHIPPED | OUTPATIENT
Start: 2017-10-04 | End: 2017-10-18

## 2017-10-04 RX ORDER — CLOBETASOL PROPIONATE 0.5 MG/G
OINTMENT TOPICAL 2 TIMES DAILY
Qty: 60 G | Refills: 1 | Status: SHIPPED | OUTPATIENT
Start: 2017-10-04 | End: 2018-05-03

## 2017-10-04 ASSESSMENT — ANXIETY QUESTIONNAIRES
3. WORRYING TOO MUCH ABOUT DIFFERENT THINGS: SEVERAL DAYS
5. BEING SO RESTLESS THAT IT IS HARD TO SIT STILL: NOT AT ALL
7. FEELING AFRAID AS IF SOMETHING AWFUL MIGHT HAPPEN: NOT AT ALL
1. FEELING NERVOUS, ANXIOUS, OR ON EDGE: SEVERAL DAYS
2. NOT BEING ABLE TO STOP OR CONTROL WORRYING: SEVERAL DAYS
IF YOU CHECKED OFF ANY PROBLEMS ON THIS QUESTIONNAIRE, HOW DIFFICULT HAVE THESE PROBLEMS MADE IT FOR YOU TO DO YOUR WORK, TAKE CARE OF THINGS AT HOME, OR GET ALONG WITH OTHER PEOPLE: NOT DIFFICULT AT ALL
GAD7 TOTAL SCORE: 4
6. BECOMING EASILY ANNOYED OR IRRITABLE: NOT AT ALL
4. TROUBLE RELAXING: SEVERAL DAYS

## 2017-10-04 ASSESSMENT — PATIENT HEALTH QUESTIONNAIRE - PHQ9: SUM OF ALL RESPONSES TO PHQ QUESTIONS 1-9: 0

## 2017-10-04 ASSESSMENT — PAIN SCALES - GENERAL: PAINLEVEL: NO PAIN (0)

## 2017-10-04 NOTE — PATIENT INSTRUCTIONS
Thank you for choosing [facility].   I have office hours 8:00 am to 4:30 pm on Monday's, Wednesday's, Thursday's and Friday's. My nurse and I are out of the office every Tuesday.    Following your visit, when your labs and diagnostic testing have returned, I will review then and you will be contacted by my nurse.  If you are on My Chart, you can also view results there.    For refills, notify your pharmacy regarding what you need and the pharmacy will generate a refill request. Do not call my nurse as she is unable to process refill request. Please plan ahead and allow 3-5 days for refill requests.    You will generally receive a reminder call the day prior to your appointment.  If you cannot attend your appointment, please cancel your appointment with as much notice as possible.  If there is a pattern of failure to present for your appointments, I cannot provide consistent, meaningful, ongoing care for you. It is very important to me that you come in for your care, so we can best assist you with your health care needs.    IMPORTANT:  Please note that it is my standard of practice to NOT participate in prescribing ongoing requested Narcotic Analgesic therapy, and/or participate in the prescribing of other controlled substances.  My nurse and I am happy to assist you with the process of referral for alternative pain management as needed, and other treatment modalities including but not limited to:  Physical Therapy, Physical Medicine and Rehab, Counseling, Chiropractic Care, Orthopedic Care, and non-narcotic medication management.     In the event that you need to be seen for emergent concerns and I am out of office,  please see one of my colleagues for acute concerns.  You may also present to  or ER.  I appreciate the opportunity to serve you and look forward to supporting your healthcare needs in the future. Please contact me with any questions or concerns that you may  have.    Sincerely,      Amanda Luu RN, PA-C

## 2017-10-04 NOTE — PROGRESS NOTES
SUBJECTIVE:   Ena Lanza is a 77 year old female who presents to clinic today for the following health issues:      Rash      Duration: 20 years off and on    Description  Location: bilateral [place]   Itching: severe    Intensity:  severe    Accompanying signs and symptoms: Rash and peeling skin    History (similar episodes/previous evaluation): on and off for the past 20 years per patient    Precipitating or alleviating factors:  New exposures:  None  Recent travel: no      Therapies tried and outcome: Tried several creams and treatments            Problem list and histories reviewed & adjusted, as indicated.  Additional history: as documented    Patient Active Problem List   Diagnosis     Lesion of sciatic nerve     Hyperlipidemia with target LDL less than 100     HTN (hypertension)     DDD (degenerative disc disease), lumbar     Essential hypertension     Type 2 diabetes mellitus without complication (H)     ACP (advance care planning)     Newly recognized heart murmur     Essential hypertension with goal blood pressure less than 140/90     Herpes zoster with complication     Type 2 diabetes mellitus with hyperglycemia, without long-term current use of insulin (H)     Heart murmur     Aortic valve sclerosis     History reviewed. No pertinent surgical history.    Social History   Substance Use Topics     Smoking status: Never Smoker     Smokeless tobacco: Never Used      Comment: yes passive exposure     Alcohol use No     Family History   Problem Relation Age of Onset     C.A.D. Father 71     cause of death     CANCER Father      melanoma     Coronary Artery Disease Father      Thyroid Disease Mother      Hypertension Mother      Thyroid Disease Sister      Other Cancer Sister      melanoma     DIABETES No family hx of      Hyperlipidemia No family hx of      Asthma No family hx of      Breast Cancer No family hx of      Colon Cancer No family hx of          No Known Allergies  Recent Labs   Lab Test   "08/21/17   0844  02/18/17   1806  01/19/17   0916  06/13/16   1050  10/21/15   1008  01/26/15   1348   02/10/14   0945   A1C  5.8   --   6.8*  6.7*  6.4*   --    < >  12.9*   LDL   --    --   107*  110*  143*   --    < >   --    HDL   --    --   69  67  73   --    < >   --    TRIG   --    --   168*  202*  199*   --    < >   --    ALT   --    --    --   18  27  34   --   27   CR   --   0.78  0.74  0.70  0.65  0.57   < >  0.70   GFRESTIMATED   --   72  77  81  89  >90  Non  GFR Calc     < >  82   GFRESTBLACK   --   88  >90   GFR Calc    >90   GFR Calc    >90  >90  African American GFR Calc     < >  >90   POTASSIUM   --   3.7  3.5  3.6  3.9  3.5   < >  3.8   TSH   --    --    --   1.03   --    --    --   1.58    < > = values in this interval not displayed.      BP Readings from Last 3 Encounters:   10/04/17 134/78   08/23/17 128/86   05/22/17 (!) 132/94    Wt Readings from Last 3 Encounters:   10/04/17 156 lb (70.8 kg)   08/23/17 150 lb (68 kg)   05/22/17 163 lb (73.9 kg)                          Reviewed and updated as needed this visit by clinical staff     Reviewed and updated as needed this visit by Provider     ROS:  Constitutional, HEENT, cardiovascular, pulmonary, gi and gu systems are negative, except as otherwise noted.      OBJECTIVE:                                                    /78 (BP Location: Left arm, Patient Position: Sitting, Cuff Size: Adult Regular)  Pulse 71  Temp 97.6  F (36.4  C) (Tympanic)  Resp 18  Ht 5' 4\" (1.626 m)  Wt 156 lb (70.8 kg)  SpO2 99%  BMI 26.78 kg/m2  Body mass index is 26.78 kg/(m^2).  GENERAL APPEARANCE: healthy, alert and no distress  MS: both hands show severe dermatitis with bleeding and secondary infection.   Scratching non stop in office. Both palms are quite irritated.  Cracking.    SKIN: as above.   NEURO: Normal strength and tone, mentation intact and speech normal  PSYCH: mentation appears normal, worried " and making some life changes.       Diagnostic test results:  Diagnostic Test Results:  none        ASSESSMENT/PLAN:                                                    1. Eczema of both hands  We are going to try below.  Stop the itching and watch infection.   We will see her back in 30 days.  Then we can further assess her need for dermatology.   She will avoid chemicals and drying out.   - hydrOXYzine (ATARAX) 25 MG tablet; Take one to two pills at bedtime for itch.  Dispense: 60 tablet; Refill: 1  - clobetasol (TEMOVATE) 0.05 % ointment; Apply topically 2 times daily  Dispense: 60 g; Refill: 1  - cephALEXin (KEFLEX) 500 MG capsule; Take 1 capsule (500 mg) by mouth 3 times daily  Dispense: 30 capsule; Refill: 0  - mupirocin (BACTROBAN) 2 % ointment; Apply topically 3 times daily for 14 days  Dispense: 30 g; Refill: 1    2. Need for prophylactic vaccination and inoculation against influenza  Given.   - HC FLU VACCINE, INCREASED ANTIGEN, PRESV FREE  - Vaccine Administration, Initial [87671]      See Patient Instructions    EFREN [place]  [facility]    Injectable Influenza Immunization Documentation    1.  Is the person to be vaccinated sick today?  NO    2. Does the person to be vaccinated have an allergy to a component   of the vaccine?   No    3. Has the person to be vaccinated ever had a serious reaction   to influenza vaccine in the past?   No    4. Has the person to be vaccinated ever had Guillain-Barré syndrome?   No    Form completed by Sheyla Maria MA

## 2017-10-04 NOTE — NURSING NOTE
"Chief Complaint   Patient presents with     Derm Problem     peeling skin and rash on both hands     Flu Shot       Initial /78 (BP Location: Left arm, Patient Position: Sitting, Cuff Size: Adult Regular)  Pulse 71  Temp 97.6  F (36.4  C) (Tympanic)  Resp 18  Ht 5' 4\" (1.626 m)  Wt 156 lb (70.8 kg)  SpO2 99%  BMI 26.78 kg/m2 Estimated body mass index is 26.78 kg/(m^2) as calculated from the following:    Height as of this encounter: 5' 4\" (1.626 m).    Weight as of this encounter: 156 lb (70.8 kg).  Medication Reconciliation: complete   Sheyla Maria MA  "

## 2017-10-04 NOTE — MR AVS SNAPSHOT
After Visit Summary   10/4/2017    Ena Lanza    MRN: [id number]           Patient Information     Date Of Birth          1940        Visit Information        Provider Department      10/4/2017 11:00 AM Amanda Luu PA [facility] [place]        Today's Diagnoses     Eczema of both hands    -  1    Need for prophylactic vaccination and inoculation against influenza          Care Instructions      Thank you for choosing [facility].   I have office hours 8:00 am to 4:30 pm on Monday's, Wednesday's, Thursday's and Friday's. My nurse and I are out of the office every Tuesday.    Following your visit, when your labs and diagnostic testing have returned, I will review then and you will be contacted by my nurse.  If you are on My Chart, you can also view results there.    For refills, notify your pharmacy regarding what you need and the pharmacy will generate a refill request. Do not call my nurse as she is unable to process refill request. Please plan ahead and allow 3-5 days for refill requests.    You will generally receive a reminder call the day prior to your appointment.  If you cannot attend your appointment, please cancel your appointment with as much notice as possible.  If there is a pattern of failure to present for your appointments, I cannot provide consistent, meaningful, ongoing care for you. It is very important to me that you come in for your care, so we can best assist you with your health care needs.    IMPORTANT:  Please note that it is my standard of practice to NOT participate in prescribing ongoing requested Narcotic Analgesic therapy, and/or participate in the prescribing of other controlled substances.  My nurse and I am happy to assist you with the process of referral for alternative pain management as needed, and other treatment modalities including but not limited to:  Physical Therapy, Physical Medicine and Rehab, Counseling, Chiropractic Care,  "Orthopedic Care, and non-narcotic medication management.     In the event that you need to be seen for emergent concerns and I am out of office,  please see one of my colleagues for acute concerns.  You may also present to UC or ER.  I appreciate the opportunity to serve you and look forward to supporting your healthcare needs in the future. Please contact me with any questions or concerns that you may have.    Sincerely,      Amanda Luu RN, PA-C               Follow-ups after your visit        Follow-up notes from your care team     Return in about 4 weeks (around 11/1/2017).      Who to contact     If you have questions or need follow up information about today's clinic visit or your schedule please contact [facility] directly at [phone].  Normal or non-critical lab and imaging results will be communicated to you by MyChart, letter or phone within 4 business days after the clinic has received the results. If you do not hear from us within 7 days, please contact the clinic through MyChart or phone. If you have a critical or abnormal lab result, we will notify you by phone as soon as possible.  Submit refill requests through [employer] or call your pharmacy and they will forward the refill request to us. Please allow 3 business days for your refill to be completed.          Additional Information About Your Visit        Care EveryWhere ID     This is your Care EveryWhere ID. This could be used by other organizations to access your [place] medical records  [id number]        Your Vitals Were     Pulse Temperature Respirations Height Pulse Oximetry BMI (Body Mass Index)    71 97.6  F (36.4  C) (Tympanic) 18 5' 4\" (1.626 m) 99% 26.78 kg/m2       Blood Pressure from Last 3 Encounters:   10/04/17 134/78   08/23/17 128/86   05/22/17 (!) 132/94    Weight from Last 3 Encounters:   10/04/17 156 lb (70.8 kg)   08/23/17 150 lb (68 kg)   05/22/17 163 lb (73.9 kg)              We Performed the Following     " HC FLU VACCINE, INCREASED ANTIGEN, PRESV FREE     Vaccine Administration, Initial [10899]          Today's Medication Changes          These changes are accurate as of: 10/4/17 11:31 AM.  If you have any questions, ask your nurse or doctor.               Start taking these medicines.        Dose/Directions    cephALEXin 500 MG capsule   Commonly known as:  KEFLEX   Used for:  Eczema of both hands   Started by:  Amanda Luu PA        Dose:  500 mg   Take 1 capsule (500 mg) by mouth 3 times daily   Quantity:  30 capsule   Refills:  0       clobetasol 0.05 % ointment   Commonly known as:  TEMOVATE   Used for:  Eczema of both hands   Started by:  Amanda Luu PA        Apply topically 2 times daily   Quantity:  60 g   Refills:  1       hydrOXYzine 25 MG tablet   Commonly known as:  ATARAX   Used for:  Eczema of both hands   Started by:  Amanda Luu PA        Take one to two pills at bedtime for itch.   Quantity:  60 tablet   Refills:  1       mupirocin 2 % ointment   Commonly known as:  BACTROBAN   Used for:  Eczema of both hands   Started by:  Amanda Luu PA        Apply topically 3 times daily for 14 days   Quantity:  30 g   Refills:  1            Where to get your medicines      These medications were sent to [id number] - [place], [place] - [address] AT [facility] [address]  [address], [place] [address]     Phone:  [phone]     cephALEXin 500 MG capsule    clobetasol 0.05 % ointment    hydrOXYzine 25 MG tablet    mupirocin 2 % ointment                Primary Care Provider Office Phone # Fax #    EFREN Salinas [phone]       [facility] [address]  [place] [address]        Equal Access to Services     [facility] SALVADOR AH: Hadisabel elizabeth Soclaire, waaxda luqadaha, qaybta kaalmada adeashelyyada, jasmina juan. So [facility] [phone].    ATENCIÓN: Si habla español, tiene a pugh disposición  servicios gratuitos de asistencia lingüística. Jude tim [phone].    We comply with applicable federal civil rights laws and [place] laws. We do not discriminate on the basis of race, color, national origin, age, disability, sex, sexual orientation, or gender identity.            Thank you!     Thank you for choosing [facility]  for your [facility]. Our goal is always to provide you with excellent care. Hearing back from our patients is one way we can continue to improve our services. Please take a few minutes to complete the written survey that you may receive in the mail after your visit with us. Thank you!             Your Updated Medication List - Protect others around you: Learn how to safely use, store and throw away your medicines at www.disposemymeds.org.          This list is accurate as of: 10/4/17 11:31 AM.  Always use your most recent med list.                   Brand Name Dispense Instructions for use Diagnosis    aspirin 81 MG chewable tablet     108 tablet    Take 1 tablet (81 mg) by mouth daily        blood glucose monitoring lancets     102 Box    Use to test blood sugar 2 times daily.    Type 2 diabetes mellitus with hyperglycemia, without long-term current use of insulin (H)       blood glucose monitoring test strip    ACCU-CHEK SMARTVIEW    100 each    Use to test blood sugar 2 times daily or as directed    Type 2 diabetes mellitus with hyperglycemia, without long-term current use of insulin (H)       cephALEXin 500 MG capsule    KEFLEX    30 capsule    Take 1 capsule (500 mg) by mouth 3 times daily    Eczema of both hands       clobetasol 0.05 % ointment    TEMOVATE    60 g    Apply topically 2 times daily    Eczema of both hands       hydrOXYzine 25 MG tablet    ATARAX    60 tablet    Take one to two pills at bedtime for itch.    Eczema of both hands       ibuprofen 800 MG tablet    ADVIL/MOTRIN    90 tablet    Take 1 tablet (800 mg) by mouth every 8 hours as needed for moderate  pain    Lumbago       lisinopril 20 MG tablet    PRINIVIL/ZESTRIL    90 tablet    Take 1 tablet (20 mg) by mouth daily    Essential hypertension with goal blood pressure less than 140/90       metFORMIN 500 MG 24 hr tablet    GLUCOPHAGE-XR    360 tablet    TAKE 2 TABLETS BY MOUTH TWICE DAILY WITH MEALS    Type 2 diabetes mellitus with hyperglycemia, without long-term current use of insulin (H)       mupirocin 2 % ointment    BACTROBAN    30 g    Apply topically 3 times daily for 14 days    Eczema of both hands       STATIN NOT PRESCRIBED (INTENTIONAL)     0 each    Statin not prescribed intentionally due to Intolerance (with supporting documentation of trying a statin at least once within the last 5 years)    Type 2 diabetes mellitus without complication (H), Hyperlipidemia with target LDL less than 100

## 2017-10-05 ASSESSMENT — ANXIETY QUESTIONNAIRES: GAD7 TOTAL SCORE: 4

## 2017-10-16 DIAGNOSIS — I10 ESSENTIAL HYPERTENSION WITH GOAL BLOOD PRESSURE LESS THAN 140/90: ICD-10-CM

## 2017-10-16 NOTE — TELEPHONE ENCOUNTER
Lisinopril      Last Written Prescription Date: 3/22/17  Last Fill Quantity: 90, # refills: 1  Last Office Visit with [facility], [facility] or [facility] prescribing provider: 10/4/17  Next 5 appointments (look out 90 days)     Nov 01, 2017  1:15 PM CDT   (Arrive by 1:00 PM)   Office Visit with EFREN Salinas   [facility] [place] ([facility] )    [address]  [place] [address]   [phone]                   Potassium   Date Value Ref Range Status   02/18/2017 3.7 3.4 - 5.3 mmol/L Final     Creatinine   Date Value Ref Range Status   02/18/2017 0.78 0.52 - 1.04 mg/dL Final     BP Readings from Last 3 Encounters:   10/04/17 134/78   08/23/17 128/86   05/22/17 (!) 132/94

## 2017-10-18 RX ORDER — LISINOPRIL 20 MG/1
TABLET ORAL
Qty: 90 TABLET | Refills: 2 | Status: SHIPPED | OUTPATIENT
Start: 2017-10-18 | End: 2018-05-03

## 2017-11-09 DIAGNOSIS — E11.65 TYPE 2 DIABETES MELLITUS WITH HYPERGLYCEMIA, WITHOUT LONG-TERM CURRENT USE OF INSULIN (H): ICD-10-CM

## 2017-11-09 NOTE — TELEPHONE ENCOUNTER
Metformin      Last Written Prescription Date: 5/4/17  Last Fill Quantity: 360,  # refills: 0   Last Office Visit with G, P or [facility] prescribing provider: 10/4/17

## 2017-11-10 NOTE — TELEPHONE ENCOUNTER
Metformin         Last Written Prescription Date: 5/4/17  Last Fill Quantity: 360, # refills: 0  Last Office Visit with [facility] or [facility] prescribing provider:  10/4/17        BP Readings from Last 3 Encounters:   10/04/17 134/78   08/23/17 128/86   05/22/17 (!) 132/94     Lab Results   Component Value Date    MICROL 45 06/13/2016     Lab Results   Component Value Date    UMALCR 58.65 06/13/2016     Creatinine   Date Value Ref Range Status   02/18/2017 0.78 0.52 - 1.04 mg/dL Final   ]  GFR Estimate   Date Value Ref Range Status   02/18/2017 72 >60 mL/min/1.7m2 Final     Comment:     Non  GFR Calc   01/19/2017 77 >60 mL/min/1.7m2 Final     Comment:     Non  GFR Calc   06/13/2016 81 >60 mL/min/1.7m2 Final     Comment:     Non  GFR Calc     GFR Estimate If Black   Date Value Ref Range Status   02/18/2017 88 >60 mL/min/1.7m2 Final     Comment:      GFR Calc   01/19/2017 >90   GFR Calc   >60 mL/min/1.7m2 Final   06/13/2016 >90   GFR Calc   >60 mL/min/1.7m2 Final     Lab Results   Component Value Date    CHOL 210 01/19/2017     Lab Results   Component Value Date    HDL 69 01/19/2017     Lab Results   Component Value Date     01/19/2017     Lab Results   Component Value Date    TRIG 168 01/19/2017     Lab Results   Component Value Date    CHOLHDLRATIO 3.5 10/21/2015     Lab Results   Component Value Date    AST 14 06/13/2016     Lab Results   Component Value Date    ALT 18 06/13/2016     Lab Results   Component Value Date    A1C 5.8 08/21/2017    A1C 6.8 01/19/2017    A1C 6.7 06/13/2016    A1C 6.4 10/21/2015    A1C 6.1 10/29/2014     Potassium   Date Value Ref Range Status   02/18/2017 3.7 3.4 - 5.3 mmol/L Final

## 2017-11-13 DIAGNOSIS — E11.65 TYPE 2 DIABETES MELLITUS WITH HYPERGLYCEMIA, WITHOUT LONG-TERM CURRENT USE OF INSULIN (H): ICD-10-CM

## 2017-11-13 RX ORDER — METFORMIN HCL 500 MG
TABLET, EXTENDED RELEASE 24 HR ORAL
Qty: 120 TABLET | Refills: 1 | Status: SHIPPED | OUTPATIENT
Start: 2017-11-13 | End: 2018-02-28

## 2017-11-13 NOTE — TELEPHONE ENCOUNTER
BP Readings from Last 3 Encounters:   10/04/17 134/78   08/23/17 128/86   05/22/17 (!) 132/94     Lab Results   Component Value Date    MICROL 45 06/13/2016     Lab Results   Component Value Date    UMALCR 58.65 06/13/2016     Creatinine   Date Value Ref Range Status   02/18/2017 0.78 0.52 - 1.04 mg/dL Final   ]  GFR Estimate   Date Value Ref Range Status   02/18/2017 72 >60 mL/min/1.7m2 Final     Comment:     Non  GFR Calc   01/19/2017 77 >60 mL/min/1.7m2 Final     Comment:     Non  GFR Calc   06/13/2016 81 >60 mL/min/1.7m2 Final     Comment:     Non  GFR Calc     GFR Estimate If Black   Date Value Ref Range Status   02/18/2017 88 >60 mL/min/1.7m2 Final     Comment:      GFR Calc   01/19/2017 >90   GFR Calc   >60 mL/min/1.7m2 Final   06/13/2016 >90   GFR Calc   >60 mL/min/1.7m2 Final     Lab Results   Component Value Date    CHOL 210 01/19/2017     Lab Results   Component Value Date    HDL 69 01/19/2017     Lab Results   Component Value Date     01/19/2017     Lab Results   Component Value Date    TRIG 168 01/19/2017     Lab Results   Component Value Date    CHOLHDLRATIO 3.5 10/21/2015     Lab Results   Component Value Date    AST 14 06/13/2016     Lab Results   Component Value Date    ALT 18 06/13/2016     Lab Results   Component Value Date    A1C 5.8 08/21/2017    A1C 6.8 01/19/2017    A1C 6.7 06/13/2016    A1C 6.4 10/21/2015    A1C 6.1 10/29/2014     Potassium   Date Value Ref Range Status   02/18/2017 3.7 3.4 - 5.3 mmol/L Final

## 2017-11-14 ENCOUNTER — TELEPHONE (OUTPATIENT)
Dept: FAMILY MEDICINE | Facility: OTHER | Age: 77
End: 2017-11-14

## 2017-11-14 DIAGNOSIS — E11.65 TYPE 2 DIABETES MELLITUS WITH HYPERGLYCEMIA, WITHOUT LONG-TERM CURRENT USE OF INSULIN (H): ICD-10-CM

## 2017-11-14 RX ORDER — METFORMIN HCL 500 MG
TABLET, EXTENDED RELEASE 24 HR ORAL
Qty: 120 TABLET | Refills: 0 | OUTPATIENT
Start: 2017-11-14

## 2017-11-16 RX ORDER — METFORMIN HCL 500 MG
TABLET, EXTENDED RELEASE 24 HR ORAL
Qty: 120 TABLET | Refills: 0 | OUTPATIENT
Start: 2017-11-16

## 2018-02-28 DIAGNOSIS — E11.65 TYPE 2 DIABETES MELLITUS WITH HYPERGLYCEMIA, WITHOUT LONG-TERM CURRENT USE OF INSULIN (H): ICD-10-CM

## 2018-02-28 RX ORDER — METFORMIN HCL 500 MG
TABLET, EXTENDED RELEASE 24 HR ORAL
Qty: 120 TABLET | Refills: 0 | Status: SHIPPED | OUTPATIENT
Start: 2018-02-28 | End: 2018-04-19

## 2018-04-19 ENCOUNTER — RADIANT APPOINTMENT (OUTPATIENT)
Dept: GENERAL RADIOLOGY | Facility: OTHER | Age: 78
End: 2018-04-19
Attending: FAMILY MEDICINE
Payer: MEDICARE

## 2018-04-19 ENCOUNTER — OFFICE VISIT (OUTPATIENT)
Dept: FAMILY MEDICINE | Facility: OTHER | Age: 78
End: 2018-04-19
Attending: PHYSICIAN ASSISTANT
Payer: MEDICARE

## 2018-04-19 VITALS
BODY MASS INDEX: 27.51 KG/M2 | HEART RATE: 85 BPM | DIASTOLIC BLOOD PRESSURE: 68 MMHG | OXYGEN SATURATION: 98 % | TEMPERATURE: 98.2 F | SYSTOLIC BLOOD PRESSURE: 162 MMHG | WEIGHT: 160.25 LBS

## 2018-04-19 DIAGNOSIS — M25.562 ACUTE PAIN OF LEFT KNEE: ICD-10-CM

## 2018-04-19 DIAGNOSIS — M25.562 ACUTE PAIN OF LEFT KNEE: Primary | ICD-10-CM

## 2018-04-19 PROCEDURE — 73562 X-RAY EXAM OF KNEE 3: CPT | Mod: TC,LT

## 2018-04-19 PROCEDURE — 99213 OFFICE O/P EST LOW 20 MIN: CPT | Performed by: FAMILY MEDICINE

## 2018-04-19 PROCEDURE — G0463 HOSPITAL OUTPT CLINIC VISIT: HCPCS

## 2018-04-19 ASSESSMENT — ANXIETY QUESTIONNAIRES
7. FEELING AFRAID AS IF SOMETHING AWFUL MIGHT HAPPEN: NOT AT ALL
3. WORRYING TOO MUCH ABOUT DIFFERENT THINGS: NOT AT ALL
5. BEING SO RESTLESS THAT IT IS HARD TO SIT STILL: MORE THAN HALF THE DAYS
GAD7 TOTAL SCORE: 5
2. NOT BEING ABLE TO STOP OR CONTROL WORRYING: NOT AT ALL
IF YOU CHECKED OFF ANY PROBLEMS ON THIS QUESTIONNAIRE, HOW DIFFICULT HAVE THESE PROBLEMS MADE IT FOR YOU TO DO YOUR WORK, TAKE CARE OF THINGS AT HOME, OR GET ALONG WITH OTHER PEOPLE: NOT DIFFICULT AT ALL
4. TROUBLE RELAXING: MORE THAN HALF THE DAYS
6. BECOMING EASILY ANNOYED OR IRRITABLE: SEVERAL DAYS
1. FEELING NERVOUS, ANXIOUS, OR ON EDGE: NOT AT ALL

## 2018-04-19 ASSESSMENT — PAIN SCALES - GENERAL: PAINLEVEL: EXTREME PAIN (8)

## 2018-04-19 NOTE — MR AVS SNAPSHOT
"              After Visit Summary   2018    Ena Lanza    MRN: [id number]           Patient Information     Date Of Birth          1940        Visit Information        Provider Department      2018 1:45 PM HILARY Morales MD [facility] [place]        Today's Diagnoses     Acute pain of left knee    -  1      Care Instructions    Call for my nurse Monday if not better.          Follow-ups after your visit        Who to contact     If you have questions or need follow up information about today's clinic visit or your schedule please contact [facility] directly at [phone].  Normal or non-critical lab and imaging results will be communicated to you by [employer], letter or phone within 4 business days after the clinic has received the results. If you do not hear from us within 7 days, please contact the clinic through [employer] or phone. If you have a critical or abnormal lab result, we will notify you by phone as soon as possible.  Submit refill requests through [employer] or call your pharmacy and they will forward the refill request to us. Please allow 3 business days for your refill to be completed.          Additional Information About Your Visit        MyChart Information     [employer] lets you send messages to your doctor, view your test results, renew your prescriptions, schedule appointments and more. To sign up, go to [email or web address] . Click on \"Log in\" on the left side of the screen, which will take you to the Welcome page. Then click on \"Sign up Now\" on the right side of the page.     You will be asked to enter the access code listed below, as well as some personal information. Please follow the directions to create your username and password.     Your access code is: [email or web address]  Expires: 2018  2:12 PM     Your access code will  in 90 days. If you need help or a new code, please call your [facility] or [phone].        Care EveryWhere ID     " This is your Care EveryWhere ID. This could be used by other organizations to access your [place] medical records  [id number]        Your Vitals Were     Pulse Temperature Pulse Oximetry BMI (Body Mass Index)          85 98.2  F (36.8  C) (Tympanic) 98% 27.51 kg/m2         Blood Pressure from Last 3 Encounters:   04/19/18 162/68   10/04/17 134/78   08/23/17 128/86    Weight from Last 3 Encounters:   04/19/18 160 lb 4 oz (72.7 kg)   10/04/17 156 lb (70.8 kg)   08/23/17 150 lb (68 kg)               Primary Care Provider Office Phone # Fax #    EFREN Salinas [phone]       [facility] [address]  [facility] [address]        Equal Access to Services     MOISES FRANCO : Hadii aad ku hadasho Soomaali, waaxda luqadaha, qaybta kaalmada adeegyada, jasmina velasquez haysimona li . So [facility] [phone].    ATENCIÓN: Si habla español, tiene a pugh disposición servicios gratuitos de asistencia lingüística. Jude al [phone].    We comply with applicable federal civil rights laws and [place] laws. We do not discriminate on the basis of race, color, national origin, age, disability, sex, sexual orientation, or gender identity.            Thank you!     Thank you for choosing [facility]  for your care. Our goal is always to provide you with excellent care. Hearing back from our patients is one way we can continue to improve our services. Please take a few minutes to complete the written survey that you may receive in the mail after your visit with us. Thank you!             Your Updated Medication List - Protect others around you: Learn how to safely use, store and throw away your medicines at www.disposemymeds.org.          This list is accurate as of 4/19/18  2:12 PM.  Always use your most recent med list.                   Brand Name Dispense Instructions for use Diagnosis    aspirin 81 MG chewable tablet     108 tablet    Take 1 tablet (81 mg) by mouth  daily        blood glucose monitoring lancets     102 Box    Use to test blood sugar 2 times daily.    Type 2 diabetes mellitus with hyperglycemia, without long-term current use of insulin (H)       blood glucose monitoring test strip    ACCU-CHEK SMARTVIEW    100 each    Use to test blood sugar 2 times daily or as directed    Type 2 diabetes mellitus with hyperglycemia, without long-term current use of insulin (H)       clobetasol 0.05 % ointment    TEMOVATE    60 g    Apply topically 2 times daily    Eczema of both hands       ibuprofen 800 MG tablet    ADVIL/MOTRIN    90 tablet    Take 1 tablet (800 mg) by mouth every 8 hours as needed for moderate pain    Lumbago       lisinopril 20 MG tablet    PRINIVIL/ZESTRIL    90 tablet    TAKE 1 TABLET BY MOUTH EVERY DAY    Essential hypertension with goal blood pressure less than 140/90       metFORMIN 500 MG 24 hr tablet    GLUCOPHAGE-XR    360 tablet    TAKE 2 TABLETS BY MOUTH TWICE DAILY WITH MEALS    Type 2 diabetes mellitus with hyperglycemia, without long-term current use of insulin (H)       STATIN NOT PRESCRIBED (INTENTIONAL)     0 each    Statin not prescribed intentionally due to Intolerance (with supporting documentation of trying a statin at least once within the last 5 years)    Type 2 diabetes mellitus without complication (H), Hyperlipidemia with target LDL less than 100

## 2018-04-19 NOTE — NURSING NOTE
"Chief Complaint   Patient presents with     Knee Pain       Initial /68  Pulse 85  Temp 98.2  F (36.8  C) (Tympanic)  Wt 160 lb 4 oz (72.7 kg)  SpO2 98%  BMI 27.51 kg/m2 Estimated body mass index is 27.51 kg/(m^2) as calculated from the following:    Height as of 10/4/17: 5' 4\" (1.626 m).    Weight as of this encounter: 160 lb 4 oz (72.7 kg).  Medication Reconciliation: complete     Breanna Felix    "

## 2018-04-19 NOTE — PROGRESS NOTES
SUBJECTIVE:                                                    Ena Lanza is a 77 year old female who presents to clinic today for the following health issues:        Knee pain      Duration: about 2 weeks    Description (location/character/radiation): back of left now on the inner side of the left knee    Intensity:  mild    Accompanying signs and symptoms: swollen below the knee and feet swell at night. Just started to hurt 2 weeks ago no injury.    History (similar episodes/previous evaluation): None    Precipitating or alleviating factors: None    Therapies tried and outcome: ibuprofen, ice and heat doesn't help   Happened in the past with pain and a few days later it resolved. Now had posterir pain that now is anterior medial pain and swelling    [facility]    Ena Lanza, 77 year old, female presents with   Chief Complaint   Patient presents with     Knee Pain       PAST MEDICAL HISTORY:  Past Medical History:   Diagnosis Date     Anxiety     no treatment     Essential hypertension 10/5/2015     Essential hypertension with goal blood pressure less than 140/90 6/13/2016     Herpes zoster with complication 6/13/2016     HTN (hypertension) 10/29/2014     Hyperlipidemia LDL goal < 100 10/29/2014     Hyperlipidemia LDL goal < 100 10/29/2014     Lesion of sciatic nerve 8/27/2014     Newly recognized heart murmur 6/13/2016     Type 2 diabetes mellitus with hyperglycemia, without long-term current use of insulin (H) 11/28/2016     Type 2 diabetes mellitus without complication (H) 10/5/2015       PAST SURGICAL HISTORY:  History reviewed. No pertinent surgical history.    MEDICATIONS:  Prior to Admission medications    Medication Sig Start Date End Date Taking? Authorizing Provider   aspirin 81 MG chewable tablet Take 1 tablet (81 mg) by mouth daily 6/13/16  Yes Amanda Luu PA   blood glucose monitoring (ACCU-CHEK FASTCLIX) lancets Use to test blood sugar 2 times daily. 11/28/16  Yes  Amanda Luu PA   blood glucose monitoring (ACCU-CHEK SMARTVIEW) test strip Use to test blood sugar 2 times daily or as directed 11/28/16  Yes Amanda Luu PA   clobetasol (TEMOVATE) 0.05 % ointment Apply topically 2 times daily 10/4/17  Yes Amanda Luu PA   ibuprofen (ADVIL/MOTRIN) 800 MG tablet Take 1 tablet (800 mg) by mouth every 8 hours as needed for moderate pain 5/31/17  Yes Amanda Luu PA   lisinopril (PRINIVIL/ZESTRIL) 20 MG tablet TAKE 1 TABLET BY MOUTH EVERY DAY 10/18/17  Yes Amanda Luu PA   metFORMIN (GLUCOPHAGE-XR) 500 MG 24 hr tablet TAKE 2 TABLETS BY MOUTH TWICE DAILY WITH MEALS 5/4/17  Yes Amanda Luu PA   STATIN NOT PRESCRIBED, INTENTIONAL, Statin not prescribed intentionally due to Intolerance (with supporting documentation of trying a statin at least once within the last 5 years) 6/13/16  Yes Amanda Luu PA       ALLERGIES:   No Known Allergies    ROS:  Constitutional, HEENT, cardiovascular, pulmonary, gi and gu systems are negative, except as otherwise noted.      EXAM:  /68  Pulse 85  Temp 98.2  F (36.8  C) (Tympanic)  Wt 160 lb 4 oz (72.7 kg)  SpO2 98%  BMI 27.51 kg/m2 Body mass index is 27.51 kg/(m^2).   GENERAL APPEARANCE: healthy, alert and no distress  EYES: Eyes grossly normal to inspection, PERRL and conjunctivae and sclerae normal  RESP: Breathing comfortably  ORTHO: Left knee no effusion good range of motion no tenderness in the knee.  Does have a little discomfort left leg anterior aspect just distal to the knee.  No ecchymosis.  No calf tenderness  Lab/ X-ray  Preliminary x-ray left knee shows lateral joint space narrowing    ASSESSMENT/PLAN:    ICD-10-CM    1. Acute pain of left knee M25.562 XR Knee Left 3 Views   Unprovoked episode of knee pain initially started left posterior now that resolved and now has right anterior lower leg about 2-3 inch area with a little bit of swelling and tenderness no ecchymosis.  She will rest  and use Tylenol and try cold packs and if not better by early next week will call with have her see Martir.      CHELLE Morales MD  April 19, 2018

## 2018-04-20 ENCOUNTER — TELEPHONE (OUTPATIENT)
Dept: FAMILY MEDICINE | Facility: OTHER | Age: 78
End: 2018-04-20

## 2018-04-20 DIAGNOSIS — M25.562 ACUTE PAIN OF LEFT KNEE: Primary | ICD-10-CM

## 2018-04-20 ASSESSMENT — ANXIETY QUESTIONNAIRES: GAD7 TOTAL SCORE: 5

## 2018-04-20 ASSESSMENT — PATIENT HEALTH QUESTIONNAIRE - PHQ9: SUM OF ALL RESPONSES TO PHQ QUESTIONS 1-9: 0

## 2018-04-20 NOTE — TELEPHONE ENCOUNTER
Patient states her knee pain is worsened. Would like to proceed with MRI. Order pending  /MEERA MCGHEE

## 2018-04-30 ENCOUNTER — TELEPHONE (OUTPATIENT)
Dept: FAMILY MEDICINE | Facility: OTHER | Age: 78
End: 2018-04-30

## 2018-04-30 NOTE — TELEPHONE ENCOUNTER
Unable to see this week. Scheduled is completely full. Can see if another provider has openings or offer urgent care. Can offer first available appointment with Amanda Luu.  Ashly Lima LPN

## 2018-04-30 NOTE — TELEPHONE ENCOUNTER
8:14 AM    Reason for Call: OVERBOOK    Patient is having the following symptoms: knee and leg swollen  for 1 weeks.    The patient is requesting an appointment for sometime this week with Amanda Luu.    Was an appointment offered for this call? No  If yes : Appointment type              Date    Preferred method for responding to this message: Telephone Call  What is your phone number ?[phone]    If we cannot reach you directly, may we leave a detailed response at the number you provided? Yes    Can this message wait until your PCP/provider returns, if unavailable today? Not applicable, PCP is in     Replaced by [facility]

## 2018-05-01 ENCOUNTER — HOSPITAL ENCOUNTER (OUTPATIENT)
Dept: MRI IMAGING | Facility: HOSPITAL | Age: 78
Discharge: HOME | End: 2018-05-01
Attending: FAMILY MEDICINE | Admitting: FAMILY MEDICINE
Payer: MEDICARE

## 2018-05-01 DIAGNOSIS — M25.562 ACUTE PAIN OF LEFT KNEE: ICD-10-CM

## 2018-05-01 PROCEDURE — 73721 MRI JNT OF LWR EXTRE W/O DYE: CPT | Mod: TC,LT

## 2018-05-02 NOTE — TELEPHONE ENCOUNTER
Pt is to increase her lisinopril by 10mg and she has an appointment to see Jus on Wednesday 05/02/18 at 1pm.  Pt is aware of all instructions.

## 2018-05-03 ENCOUNTER — OFFICE VISIT (OUTPATIENT)
Dept: FAMILY MEDICINE | Facility: OTHER | Age: 78
End: 2018-05-03
Attending: PHYSICIAN ASSISTANT
Payer: COMMERCIAL

## 2018-05-03 VITALS
HEIGHT: 64 IN | OXYGEN SATURATION: 98 % | WEIGHT: 160 LBS | TEMPERATURE: 98.4 F | DIASTOLIC BLOOD PRESSURE: 72 MMHG | BODY MASS INDEX: 27.31 KG/M2 | SYSTOLIC BLOOD PRESSURE: 138 MMHG | HEART RATE: 70 BPM

## 2018-05-03 DIAGNOSIS — L30.9 HAND DERMATITIS: Primary | ICD-10-CM

## 2018-05-03 DIAGNOSIS — I10 ESSENTIAL HYPERTENSION WITH GOAL BLOOD PRESSURE LESS THAN 140/90: ICD-10-CM

## 2018-05-03 DIAGNOSIS — S83.242D OTHER TEAR OF MEDIAL MENISCUS OF LEFT KNEE AS CURRENT INJURY, SUBSEQUENT ENCOUNTER: ICD-10-CM

## 2018-05-03 DIAGNOSIS — M71.22 BAKER'S CYST OF KNEE, LEFT: ICD-10-CM

## 2018-05-03 PROCEDURE — G0463 HOSPITAL OUTPT CLINIC VISIT: HCPCS

## 2018-05-03 PROCEDURE — 99214 OFFICE O/P EST MOD 30 MIN: CPT | Performed by: PHYSICIAN ASSISTANT

## 2018-05-03 RX ORDER — LISINOPRIL 20 MG/1
30 TABLET ORAL DAILY
Qty: 90 TABLET | Refills: 2 | COMMUNITY
Start: 2018-05-03 | End: 2018-07-02

## 2018-05-03 ASSESSMENT — ANXIETY QUESTIONNAIRES
GAD7 TOTAL SCORE: 7
6. BECOMING EASILY ANNOYED OR IRRITABLE: NOT AT ALL
IF YOU CHECKED OFF ANY PROBLEMS ON THIS QUESTIONNAIRE, HOW DIFFICULT HAVE THESE PROBLEMS MADE IT FOR YOU TO DO YOUR WORK, TAKE CARE OF THINGS AT HOME, OR GET ALONG WITH OTHER PEOPLE: SOMEWHAT DIFFICULT
5. BEING SO RESTLESS THAT IT IS HARD TO SIT STILL: SEVERAL DAYS
1. FEELING NERVOUS, ANXIOUS, OR ON EDGE: MORE THAN HALF THE DAYS
7. FEELING AFRAID AS IF SOMETHING AWFUL MIGHT HAPPEN: NOT AT ALL
2. NOT BEING ABLE TO STOP OR CONTROL WORRYING: SEVERAL DAYS
4. TROUBLE RELAXING: MORE THAN HALF THE DAYS
3. WORRYING TOO MUCH ABOUT DIFFERENT THINGS: SEVERAL DAYS

## 2018-05-03 ASSESSMENT — PAIN SCALES - GENERAL: PAINLEVEL: MILD PAIN (2)

## 2018-05-03 NOTE — PROGRESS NOTES
SUBJECTIVE:   Ena Lanza is a 77 year old female who presents to clinic today for the following health issues:      Musculoskeletal problem/pain      Duration: Almost 3 weeks    Description  Location: left knee    Intensity:  Severe     Accompanying signs and symptoms: radiation of pain to foot and swelling    And gets tight.    History  Previous similar problem: no   Previous evaluation:  MRI [facility]    Precipitating or alleviating factors:  Trauma or overuse: no   Aggravating factors include: sitting, standing, walking, climbing stairs and exercise    Therapies tried and outcome: rest/inactivity, heat, ice and Ibuprofen,every am swelling is gone then comes back in 2 hours.          Problem list and histories reviewed & adjusted, as indicated.  Additional history: as documented    Patient Active Problem List   Diagnosis     Lesion of sciatic nerve     Hyperlipidemia with target LDL less than 100     HTN (hypertension)     DDD (degenerative disc disease), lumbar     Essential hypertension     Type 2 diabetes mellitus without complication (H)     ACP (advance care planning)     Newly recognized heart murmur     Essential hypertension with goal blood pressure less than 140/90     Herpes zoster with complication     Type 2 diabetes mellitus with hyperglycemia, without long-term current use of insulin (H)     Heart murmur     Aortic valve sclerosis     History reviewed. No pertinent surgical history.    Social History   Substance Use Topics     Smoking status: Never Smoker     Smokeless tobacco: Never Used      Comment: yes passive exposure     Alcohol use No     Family History   Problem Relation Age of Onset     C.A.D. Father 71     cause of death     CANCER Father      melanoma     Coronary Artery Disease Father      Thyroid Disease Mother      Hypertension Mother      Thyroid Disease Sister      Other Cancer Sister      melanoma     DIABETES No family hx of      Hyperlipidemia No family hx of      Asthma No family  "hx of      Breast Cancer No family hx of      Colon Cancer No family hx of          Current Outpatient Prescriptions   Medication Sig Dispense Refill     aspirin 81 MG chewable tablet Take 1 tablet (81 mg) by mouth daily 108 tablet 3     blood glucose monitoring (ACCU-CHEK FASTCLIX) lancets Use to test blood sugar 2 times daily. 102 Box 5     blood glucose monitoring (ACCU-CHEK SMARTVIEW) test strip Use to test blood sugar 2 times daily or as directed 100 each 5     ibuprofen (ADVIL/MOTRIN) 800 MG tablet Take 1 tablet (800 mg) by mouth every 8 hours as needed for moderate pain 90 tablet 1     lisinopril (PRINIVIL/ZESTRIL) 20 MG tablet TAKE 1 TABLET BY MOUTH EVERY DAY 90 tablet 2     metFORMIN (GLUCOPHAGE-XR) 500 MG 24 hr tablet TAKE 2 TABLETS BY MOUTH TWICE DAILY WITH MEALS 360 tablet 0     STATIN NOT PRESCRIBED, INTENTIONAL, Statin not prescribed intentionally due to Intolerance (with supporting documentation of trying a statin at least once within the last 5 years) 0 each 0     clobetasol (TEMOVATE) 0.05 % ointment Apply topically 2 times daily (Patient not taking: Reported on 5/3/2018) 60 g 1     No Known Allergies  BP Readings from Last 3 Encounters:   05/03/18 160/70   04/19/18 162/68   10/04/17 134/78    Wt Readings from Last 3 Encounters:   05/03/18 160 lb (72.6 kg)   04/19/18 160 lb 4 oz (72.7 kg)   10/04/17 156 lb (70.8 kg)                    Reviewed and updated as needed this visit by clinical staff  Tobacco  Allergies  Meds  Med Hx  Surg Hx  Fam Hx  Soc Hx      Reviewed and updated as needed this visit by Provider         ROS:  Constitutional, neuro, ENT, endocrine, pulmonary, cardiac, gastrointestinal, genitourinary, musculoskeletal, integument and psychiatric systems are negative, except as otherwise noted.    OBJECTIVE:                                                    /70  Pulse 70  Temp 98.4  F (36.9  C)  Ht 5' 4\" (1.626 m)  Wt 160 lb (72.6 kg)  SpO2 98%  BMI 27.46 kg/m2  Body " mass index is 27.46 kg/(m^2).  GENERAL APPEARANCE: healthy, alert and no distress  MS: has large amount of swelling on her left knee and behind knee. Going to foot. And is very   SKIN: blistering dry rash on hands that is open and not weeping.   NEURO: mentation intact, speech normal and gait abnormal due to knee pain and mild numbness in foot from swelling   PSYCH: mentation appears normal and affect normal/bright    Diagnostic test results:  Diagnostic Test Results:  Results for orders placed or performed during the hospital encounter of 05/01/18   MR Knee Left w/o Contrast    Narrative    Examination: MR KNEE LEFT W/O CONTRAST  5/1/2018 3:11 PM    Clinical History: Female, age 77 years,  patient requesting  appointment ASAP; Acute pain of left knee    Comparison: Left knee x-rays 4/19/2018    Technique:  Sagittal proton density fat saturation, T2; axial proton  density without with fat saturation; coronal proton-density fat  saturation T1.    Findings:    Medial Compartment:     Medial meniscus:  Myxoid degenerative changes. Free edge fraying  within the body. Serpiginous high signal focus extends to the superior  articular surface suggesting a linear tear involving the periphery  closely within the body.      Weightbearing articular cartilage: Fraying and thinning of the  weightbearing cartilage along the inner margin of the medial femoral  condyle.      Medial collateral ligament: Intact    ACL:  Intact    PCL:  Intact.    Extensor Mechanism:  Intact.    Lateral Compartment:     Lateral meniscus:  Lateral meniscus is intact.      Weightbearing articular cartilage:  Articular cartilage is intact.       Lateral collateral ligament complex: Intact.      The proximal tibiofibular articulation is congruent.    Femoral patellar joint:   Alignment:  Alignment is within normal limits.     Articular cartilage: Near complete full-thickness fissure involving  the retropatellar articular cartilage at the apex. There  is  fibrillation involving the midline and para midline aspects of the  trochlear articular cartilage.      Joint space: Small joint effusion.    Musculature and retinacula: Normal in bulk and contour. Retinacula  intact.    Neurovascular structures:  Normal.    Osseous Structures:  12 x 7 mm peripheral osteochondral lesion  involving the anterior aspect of the medial tibial plateau with marked  thinning versus loss of the intervening articular cartilage between  the bone and the body of the medial meniscus. No evidence of an  unstable fracture fragment.    Other:  Relatively large, complex leaking Baker's cyst.        Impression    IMPRESSION:   1.   Moderate medial compartment degenerative changes with a 12 x 7 mm  peripheral osteochondral lesion involving the anterior margins of the  medial tibial plateau, subjacent to the body of the medial meniscus.  Free edge fraying/blunting is seen within the body of the medial  meniscus.     2.  Partial and full-thickness fraying/thinning involving the  weightbearing articular cartilage of the medial compartment.    3.  Moderate femoral patellar joint degenerative change with near  full-thickness fissure involving the retropatellar articular cartilage  at the patellar apex.    4.  Relatively large, complex leaking Baker's cyst measuring  approximate 9 cm in craniocaudal length by 2.5 cm in AP dimension by  3.5 cm in transverse dimension.    DARYL COLLADO MD        ASSESSMENT/PLAN:                                                    1. Essential hypertension with goal blood pressure less than 140/90  She is high again today.  Gaol of 150 is recommended.  Increase her lisinopril to 30 mg. Recheck in a month.  Is in pain and this might be the cause.    - lisinopril (PRINIVIL/ZESTRIL) 20 MG tablet; Take 1.5 tablets (30 mg) by mouth daily  Dispense: 90 tablet; Refill: 2    2. Hand dermatitis  Referral would like to see derm.   - DERMATOLOGY REFERRAL    3. Baker's cyst of knee,  left  Significant swelling and bruising.  She is not able to see ortho for 3 weeks.  Spoke with ortho.  They felt she should wait due to the swelling.      4. Other tear of medial meniscus of left knee as current injury, subsequent encounter  Ordered an MRI and has large bakes cyst and tear.  Will see ortho as we scheduled.       See Patient Instructions    EFREN Troy  [facility]

## 2018-05-03 NOTE — MR AVS SNAPSHOT
After Visit Summary   5/3/2018    Ena Lanza    MRN: [id number]           Patient Information     Date Of Birth          1940        Visit Information        Provider Department      5/3/2018 1:00 PM Amanda Luu PA [facility]        Today's Diagnoses     Hand dermatitis    -  1    Essential hypertension with goal blood pressure less than 140/90        Baker's cyst of knee, left        Other tear of medial meniscus of left knee as current injury, subsequent encounter          Care Instructions      Thank you for choosing [facility].   I have office hours 8:00 am to 4:30 pm on Monday's, Wednesday's, Thursday's and Friday's. My nurse and I are out of the office every Tuesday.    Following your visit, when your labs and diagnostic testing have returned, I will review then and you will be contacted by my nurse.  If you are on My Chart, you can also view results there.    For refills, notify your pharmacy regarding what you need and the pharmacy will generate a refill request. Do not call my nurse as she is unable to process refill request. Please plan ahead and allow 3-5 days for refill requests.    You will generally receive a reminder call the day prior to your appointment.  If you cannot attend your appointment, please cancel your appointment with as much notice as possible.  If there is a pattern of failure to present for your appointments, I cannot provide consistent, meaningful, ongoing care for you. It is very important to me that you come in for your care, so we can best assist you with your health care needs.    IMPORTANT:  Please note that it is my standard of practice to NOT participate in prescribing ongoing requested Narcotic Analgesic therapy, and/or participate in the prescribing of other controlled substances.  My nurse and I am happy to assist you with the process of referral for alternative pain management as needed, and other treatment modalities  including but not limited to:  Physical Therapy, Physical Medicine and Rehab, Counseling, Chiropractic Care, Orthopedic Care, and non-narcotic medication management.     In the event that you need to be seen for emergent concerns and I am out of office,  please see one of my colleagues for acute concerns.  You may also present to  or ER.  I appreciate the opportunity to serve you and look forward to supporting your healthcare needs in the future. Please contact me with any questions or concerns that you may have.    Sincerely,      Amanda Luu RN, PA-C               Follow-ups after your visit        Additional Services     DERMATOLOGY REFERRAL       Your provider has referred you to: [facility] [phone]   [email or web address]    Please be aware that coverage of these services is subject to the terms and limitations of your health insurance plan.  Call member services at your health plan with any benefit or coverage questions.      Please bring the following with you to your appointment:    (1) Any X-Rays, CTs or MRIs which have been performed.  Contact the facility where they were done to arrange for  prior to your scheduled appointment.    (2) List of current medications  (3) This referral request   (4) Any documents/labs given to you for this referral                  Your next 10 appointments already scheduled     May 09, 2018  1:45 PM CDT   (Arrive by 1:30 PM)   SHORT with EFREN Salinas   [facility] ([facility] )    [address] [place] [facility] [address]   [phone]            May 14, 2018  1:00 PM CDT   (Arrive by 12:45 PM)   New Visit with Pb Guevara DO    ORTHOPEDICS ([facility] )    [address] [facility] [id number] [phone]              Who to contact     If you have questions or need follow up information about today's clinic visit or your schedule please contact [facility]  "[place] directly at [phone].  Normal or non-critical lab and imaging results will be communicated to you by [employer], letter or phone within 4 business days after the clinic has received the results. If you do not hear from us within 7 days, please contact the clinic through [employer] or phone. If you have a critical or abnormal lab result, we will notify you by phone as soon as possible.  Submit refill requests through [employer] or call your pharmacy and they will forward the refill request to us. Please allow 3 business days for your refill to be completed.          Additional Information About Your Visit        [employer] Information     [employer] lets you send messages to your doctor, view your test results, renew your prescriptions, schedule appointments and more. To sign up, go to [email or web address] . Click on \"Log in\" on the left side of the screen, which will take you to the Welcome page. Then click on \"Sign up Now\" on the right side of the page.     You will be asked to enter the access code listed below, as well as some personal information. Please follow the directions to create your username and password.     Your access code is: [email or web address]  Expires: 2018  2:12 PM     Your access code will  in 90 days. If you need help or a new code, please call your [facility] clinic or [phone].        Care EveryWhere ID     This is your Care EveryWhere ID. This could be used by other organizations to access your [facility] medical records  [id number]        Your Vitals Were     Pulse Temperature Height Pulse Oximetry BMI (Body Mass Index)       70 98.4  F (36.9  C) 5' 4\" (1.626 m) 98% 27.46 kg/m2        Blood Pressure from Last 3 Encounters:   18 160/70   18 162/68   10/04/17 134/78    Weight from Last 3 Encounters:   18 160 lb (72.6 kg)   18 160 lb 4 oz (72.7 kg)   10/04/17 156 lb (70.8 kg)              We Performed the Following     DERMATOLOGY REFERRAL          Today's " Medication Changes          These changes are accurate as of 5/3/18  1:51 PM.  If you have any questions, ask your nurse or doctor.               These medicines have changed or have updated prescriptions.        Dose/Directions    lisinopril 20 MG tablet   Commonly known as:  PRINIVIL/ZESTRIL   This may have changed:  See the new instructions.   Used for:  Essential hypertension with goal blood pressure less than 140/90   Changed by:  Amanda Luu PA        Dose:  30 mg   Take 1.5 tablets (30 mg) by mouth daily   Quantity:  90 tablet   Refills:  2                Primary Care Provider Office Phone # Fax #    EFREN Salinas [phone]       [address] [place] [address]        Equal Access to Services     [facility] Center[facility]ILARY : Hadii emmanuel junior hadasho Somichelaali, waaxda luqadaha, qaybta kaalmada adeegyada, jasmina juan. So [facility] [phone].    ATENCIÓN: Si habla español, tiene a pugh disposición servicios gratuitos de asistencia lingüística. [facility] [phone].    We comply with applicable federal civil rights laws and [place] laws. We do not discriminate on the basis of race, color, national origin, age, disability, sex, sexual orientation, or gender identity.            Thank you!     Thank you for choosing [facility]  for your care. Our goal is always to provide you with excellent care. Hearing back from our patients is one way we can continue to improve our services. Please take a few minutes to complete the written survey that you may receive in the mail after your visit with us. Thank you!             Your Updated Medication List - Protect others around you: Learn how to safely use, store and throw away your medicines at www.disposemymeds.org.          This list is accurate as of 5/3/18  1:51 PM.  Always use your most recent med list.                   Brand Name Dispense Instructions for use Diagnosis    aspirin 81 MG chewable tablet      108 tablet    Take 1 tablet (81 mg) by mouth daily        blood glucose monitoring lancets     102 Box    Use to test blood sugar 2 times daily.    Type 2 diabetes mellitus with hyperglycemia, without long-term current use of insulin (H)       blood glucose monitoring test strip    ACCU-CHEK SMARTVIEW    100 each    Use to test blood sugar 2 times daily or as directed    Type 2 diabetes mellitus with hyperglycemia, without long-term current use of insulin (H)       ibuprofen 800 MG tablet    ADVIL/MOTRIN    90 tablet    Take 1 tablet (800 mg) by mouth every 8 hours as needed for moderate pain    Lumbago       lisinopril 20 MG tablet    PRINIVIL/ZESTRIL    90 tablet    Take 1.5 tablets (30 mg) by mouth daily    Essential hypertension with goal blood pressure less than 140/90       metFORMIN 500 MG 24 hr tablet    GLUCOPHAGE-XR    360 tablet    TAKE 2 TABLETS BY MOUTH TWICE DAILY WITH MEALS    Type 2 diabetes mellitus with hyperglycemia, without long-term current use of insulin (H)       STATIN NOT PRESCRIBED (INTENTIONAL)     0 each    Statin not prescribed intentionally due to Intolerance (with supporting documentation of trying a statin at least once within the last 5 years)    Type 2 diabetes mellitus without complication (H), Hyperlipidemia with target LDL less than 100

## 2018-05-03 NOTE — PATIENT INSTRUCTIONS
Thank you for choosing [facility].   I have office hours 8:00 am to 4:30 pm on Monday's, Wednesday's, Thursday's and Friday's. My nurse and I are out of the office every Tuesday.    Following your visit, when your labs and diagnostic testing have returned, I will review then and you will be contacted by my nurse.  If you are on My Chart, you can also view results there.    For refills, notify your pharmacy regarding what you need and the pharmacy will generate a refill request. Do not call my nurse as she is unable to process refill request. Please plan ahead and allow 3-5 days for refill requests.    You will generally receive a reminder call the day prior to your appointment.  If you cannot attend your appointment, please cancel your appointment with as much notice as possible.  If there is a pattern of failure to present for your appointments, I cannot provide consistent, meaningful, ongoing care for you. It is very important to me that you come in for your care, so we can best assist you with your health care needs.    IMPORTANT:  Please note that it is my standard of practice to NOT participate in prescribing ongoing requested Narcotic Analgesic therapy, and/or participate in the prescribing of other controlled substances.  My nurse and I am happy to assist you with the process of referral for alternative pain management as needed, and other treatment modalities including but not limited to:  Physical Therapy, Physical Medicine and Rehab, Counseling, Chiropractic Care, Orthopedic Care, and non-narcotic medication management.     In the event that you need to be seen for emergent concerns and I am out of office,  please see one of my colleagues for acute concerns.  You may also present to  or ER.  I appreciate the opportunity to serve you and look forward to supporting your healthcare needs in the future. Please contact me with any questions or concerns that you may  have.    Sincerely,      Amanda Luu RN, PA-C

## 2018-05-04 ASSESSMENT — PATIENT HEALTH QUESTIONNAIRE - PHQ9: SUM OF ALL RESPONSES TO PHQ QUESTIONS 1-9: 0

## 2018-05-04 ASSESSMENT — ANXIETY QUESTIONNAIRES: GAD7 TOTAL SCORE: 7

## 2018-05-07 ENCOUNTER — TELEPHONE (OUTPATIENT)
Dept: FAMILY MEDICINE | Facility: OTHER | Age: 78
End: 2018-05-07

## 2018-05-07 DIAGNOSIS — E11.65 TYPE 2 DIABETES MELLITUS WITH HYPERGLYCEMIA, WITHOUT LONG-TERM CURRENT USE OF INSULIN (H): ICD-10-CM

## 2018-05-07 NOTE — TELEPHONE ENCOUNTER
Metformin   Last Written Prescription Date:  5/4/17  Last Fill Quantity: 360,   # refills: 0  Last Office Visit: 5/3/18  Future Office visit:    Next 5 appointments (look out 90 days)     May 09, 2018  1:45 PM CDT   (Arrive by 1:30 PM)   SHORT with EFREN Salinas   [facility] ([facility] )    [address] [place] [address]   [phone]

## 2018-05-07 NOTE — LETTER
May 9, 2018      Ena Lanza  [address] [place] [address]        Dear Ena,     We are concerned about your health care.  We recently provided you with medication refills.  A lipid profile, microalbumin, and hemoglobin A1C lab tests are required every year in order to continue refilling your metformin.  Orders have been placed in our computer and should be accessible at most [facility] labs. You WILL need to be fasting for this lab. Please complete this as soon as possible. If you have any questions please call us at [phone].    Sincerely,  EFREN Troy

## 2018-05-07 NOTE — TELEPHONE ENCOUNTER
8:45 AM    Reason for Call: Phone Call    Description: Pt called and stated she was seen on 05/03/18. She is wondering if she still needs her appt on 05/09/18 since her BP was already checked during the appt last Thurs? Please call her back at [phone]    Was an appointment offered for this call? No  If yes : Appointment type              Date    Preferred method for responding to this message: Telephone Call  What is your phone number ?    If we cannot reach you directly, may we leave a detailed response at the number you provided? Yes    Can this message wait until your PCP/provider returns, if available today? Not applicable    Amalia Arguelles

## 2018-05-09 RX ORDER — METFORMIN HCL 500 MG
TABLET, EXTENDED RELEASE 24 HR ORAL
Qty: 240 TABLET | Refills: 0 | Status: SHIPPED | OUTPATIENT
Start: 2018-05-09 | End: 2018-08-06

## 2018-05-14 ENCOUNTER — OFFICE VISIT (OUTPATIENT)
Dept: ORTHOPEDICS | Facility: OTHER | Age: 78
End: 2018-05-14
Attending: ORTHOPAEDIC SURGERY
Payer: COMMERCIAL

## 2018-05-14 VITALS
TEMPERATURE: 98.6 F | BODY MASS INDEX: 27.31 KG/M2 | SYSTOLIC BLOOD PRESSURE: 130 MMHG | WEIGHT: 160 LBS | OXYGEN SATURATION: 98 % | DIASTOLIC BLOOD PRESSURE: 76 MMHG | HEART RATE: 90 BPM | HEIGHT: 64 IN

## 2018-05-14 DIAGNOSIS — M25.562 CHRONIC PAIN OF LEFT KNEE: Primary | ICD-10-CM

## 2018-05-14 DIAGNOSIS — G89.29 CHRONIC PAIN OF LEFT KNEE: Primary | ICD-10-CM

## 2018-05-14 PROCEDURE — 99203 OFFICE O/P NEW LOW 30 MIN: CPT | Performed by: ORTHOPAEDIC SURGERY

## 2018-05-14 PROCEDURE — G0463 HOSPITAL OUTPT CLINIC VISIT: HCPCS

## 2018-05-14 ASSESSMENT — PAIN SCALES - GENERAL: PAINLEVEL: MILD PAIN (3)

## 2018-05-14 NOTE — MR AVS SNAPSHOT
"              After Visit Summary   2018    Ena Lanza    MRN: [id number]           Patient Information     Date Of Birth          1940        Visit Information        Provider Department      2018 1:00 PM Pb Guevara, DO  [facility]        Today's Diagnoses     Chronic pain of left knee    -  1       Follow-ups after your visit        Who to contact     If you have questions or need follow up information about today's clinic visit or your schedule please contact  ORTH[facility]EDICS directly at [phone].  Normal or non-critical lab and imaging results will be communicated to you by MyChart, letter or phone within 4 business days after the clinic has received the results. If you do not hear from us within 7 days, please contact the clinic through [email or web address] or phone. If you have a critical or abnormal lab result, we will notify you by phone as soon as possible.  Submit refill requests through [employer] or call your pharmacy and they will forward the refill request to us. Please allow 3 business days for your refill to be completed.          Additional Information About Your Visit        MyChart Information     [employer] lets you send messages to your doctor, view your test results, renew your prescriptions, schedule appointments and more. To sign up, go to [email or web address] . Click on \"Log in\" on the left side of the screen, which will take you to the Welcome page. Then click on \"Sign up Now\" on the right side of the page.     You will be asked to enter the access code listed below, as well as some personal information. Please follow the directions to create your username and password.     Your access code is: [email or web address]  Expires: 2018  2:12 PM     Your access code will  in 90 days. If you need help or a new code, please call your [place] clinic or [phone].        Care EveryWhere ID     This is your Care EveryWhere ID. This could be used by other organizations to access your " "[facility] medical records  [id number]        Your Vitals Were     Pulse Temperature Height Pulse Oximetry BMI (Body Mass Index)       90 98.6  F (37  C) (Tympanic) 5' 4\" (1.626 m) 98% 27.46 kg/m2        Blood Pressure from Last 3 Encounters:   05/14/18 130/76   05/03/18 138/72   04/19/18 162/68    Weight from Last 3 Encounters:   05/14/18 160 lb (72.6 kg)   05/03/18 160 lb (72.6 kg)   04/19/18 160 lb 4 oz (72.7 kg)              Today, you had the following     No orders found for display       Primary Care Provider Office Phone # Fax #    EFREN Salinas [phone]       [address] [place] [id number]        Equal Access to Services     SARAH FRANCO : Hadii emmanuel junior hadasho Soomaali, waaxda luqadaha, qaybta kaalmada adesyed, jasmina li . So [facility] [phone].    ATENCIÓN: Si habla español, tiene a pugh disposición servicios gratuitos de asistencia lingüística. Jdue al [phone].    We comply with applicable federal civil rights laws and [place] laws. We do not discriminate on the basis of race, color, national origin, age, disability, sex, sexual orientation, or gender identity.            Thank you!     Thank you for choosing  [facility]  for your care. Our goal is always to provide you with excellent care. Hearing back from our patients is one way we can continue to improve our services. Please take a few minutes to complete the written survey that you may receive in the mail after your visit with us. Thank you!             Your Updated Medication List - Protect others around you: Learn how to safely use, store and throw away your medicines at www.disposemymeds.org.          This list is accurate as of 5/14/18  1:35 PM.  Always use your most recent med list.                   Brand Name Dispense Instructions for use Diagnosis    aspirin 81 MG chewable tablet     108 tablet    Take 1 tablet (81 mg) by mouth daily        blood glucose " monitoring lancets     102 Box    Use to test blood sugar 2 times daily.    Type 2 diabetes mellitus with hyperglycemia, without long-term current use of insulin (H)       blood glucose monitoring test strip    ACCU-CHEK SMARTVIEW    100 each    Use to test blood sugar 2 times daily or as directed    Type 2 diabetes mellitus with hyperglycemia, without long-term current use of insulin (H)       ibuprofen 800 MG tablet    ADVIL/MOTRIN    90 tablet    Take 1 tablet (800 mg) by mouth every 8 hours as needed for moderate pain    Lumbago       lisinopril 20 MG tablet    PRINIVIL/ZESTRIL    90 tablet    Take 1.5 tablets (30 mg) by mouth daily    Essential hypertension with goal blood pressure less than 140/90       metFORMIN 500 MG 24 hr tablet    GLUCOPHAGE-XR    240 tablet    TAKE 2 TABLETS BY MOUTH TWICE DAILY WITH MEALS    Type 2 diabetes mellitus with hyperglycemia, without long-term current use of insulin (H)       STATIN NOT PRESCRIBED (INTENTIONAL)     0 each    Statin not prescribed intentionally due to Intolerance (with supporting documentation of trying a statin at least once within the last 5 years)    Type 2 diabetes mellitus without complication (H), Hyperlipidemia with target LDL less than 100

## 2018-05-14 NOTE — PROGRESS NOTES
Patient presents today for evaluation of her left knee.  She has had some dull aching intermittently in her left knee for several months, and recently while on a extended trip to the [facility] to visit her elderly mother, she experiencing pain in the popliteal fossa area and some swelling in the leg.  She continues to have her baseline ache along the medial compartment of the knee.  She denies any locking clicking or instability.  And since she is returned home, the pain has lessened to the point that she is occasionally taking 800 mg tablet of ibuprofen.  She's had no prior significant injury and no surgery on that knee    Past medical history: Hyperlipidemia, essential hypertension, type 2 diabetes, controlled.    Family history: Longevity    Allergies, none known    Review of systems: The patient has been healthy, no new diagnoses no change in weight, no constitutional symptoms.  General, HEENT, respiratory, cardiovascular, GI, , neurologic, all normal unless otherwise indicated.  Musculoskeletal: Refer to present complaint.    Physical exam: Patient is an alert, healthy-appearing pleasant female in no apparent distress.  HEENT: Midline trachea, full range of motion cervical spine, cranial nerves II through XII intact.    Chest: Normal excursion and symmetry    Cardiovascular: Regular rate and rhythm, stable vital signs.    GI and abdomen: Normal    Neurologic: Good gait and balance    Musculoskeletal: Friday left knees are examined.  The right knee has a ge of motion, no effusion, no tenderness on the joint line nontender withpatella manipulation.  The knee is stable to varus valgus and drawer.  Skin is intact, sensation is intact, there are no varicosities.    Examination of left knee: Mild effusion, mild tenderness over the medial joint line, mild tenderness with compression of the patella.  Knee is stable to varus valgus and shortness of full range of motion.  There is a small popliteal cyst consistent with  "her MRI findings of a Baker cyst.    Assessment and plan: Patient has early arthritic change, and present time she is managing despite weight control, exercise, and occasionally taking her milligrams of ibuprofen.  We discussed precautions for long-term use of ibuprofen.  We discussed a stepwise treatment for osteoarthritis as well as the usual treatment for Baker cyst.  Since she is experiencing no vascular or neurologic compromise from the cyst, considered to be monitored.  She'll follow up should her symptoms worsen./76  Pulse 90  Temp 98.6  F (37  C) (Tympanic)  Ht 5' 4\" (1.626 m)  Wt 160 lb (72.6 kg)  SpO2 98%  BMI 27.46 kg/m2  "

## 2018-05-14 NOTE — NURSING NOTE
"Chief Complaint   Patient presents with     Musculoskeletal Problem     NPT Left Knee Pain X 1 mo       Initial /76  Pulse 90  Temp 98.6  F (37  C) (Tympanic)  Ht 5' 4\" (1.626 m)  Wt 160 lb (72.6 kg)  SpO2 98%  BMI 27.46 kg/m2 Estimated body mass index is 27.46 kg/(m^2) as calculated from the following:    Height as of this encounter: 5' 4\" (1.626 m).    Weight as of this encounter: 160 lb (72.6 kg).  Medication Reconciliation: complete    Sirisha Shepherd LPN    "

## 2018-06-29 DIAGNOSIS — I10 ESSENTIAL HYPERTENSION WITH GOAL BLOOD PRESSURE LESS THAN 140/90: ICD-10-CM

## 2018-06-29 NOTE — TELEPHONE ENCOUNTER
Refill request for :  Lisinopril (Prinvil / Zestril 20 mg tab  Last refill 5-3-18 , qty #90 with 2 refills  Last office visit 5-3-18

## 2018-07-02 RX ORDER — LISINOPRIL 20 MG/1
TABLET ORAL
Qty: 90 TABLET | Refills: 0 | OUTPATIENT
Start: 2018-07-02

## 2018-07-02 RX ORDER — LISINOPRIL 20 MG/1
30 TABLET ORAL DAILY
Qty: 45 TABLET | Refills: 0 | Status: SHIPPED | OUTPATIENT
Start: 2018-07-02 | End: 2018-08-06

## 2018-08-06 ENCOUNTER — TELEPHONE (OUTPATIENT)
Dept: FAMILY MEDICINE | Facility: OTHER | Age: 78
End: 2018-08-06

## 2018-08-06 DIAGNOSIS — E11.65 TYPE 2 DIABETES MELLITUS WITH HYPERGLYCEMIA, WITHOUT LONG-TERM CURRENT USE OF INSULIN (H): ICD-10-CM

## 2018-08-06 DIAGNOSIS — I10 ESSENTIAL HYPERTENSION WITH GOAL BLOOD PRESSURE LESS THAN 140/90: ICD-10-CM

## 2018-08-06 RX ORDER — LISINOPRIL 20 MG/1
30 TABLET ORAL DAILY
Qty: 45 TABLET | Refills: 0 | Status: SHIPPED | OUTPATIENT
Start: 2018-08-06 | End: 2018-08-31

## 2018-08-06 RX ORDER — METFORMIN HCL 500 MG
TABLET, EXTENDED RELEASE 24 HR ORAL
Qty: 240 TABLET | Refills: 0 | Status: SHIPPED | OUTPATIENT
Start: 2018-08-06 | End: 2018-09-05

## 2018-08-06 NOTE — TELEPHONE ENCOUNTER
11:52 AM    Reason for Call: Phone Call    Description: Pt called and states that she would like to see if she could get a call back regarding her running out of her lisinopril (PRINIVIL/ZESTRIL) 20 MG tablet and her metFORMIN (GLUCOPHAGE-XR) 500 MG 24 hr tablet in 2 weeks and she would like to see if she could get these medications filled up until her appointment date on 09/20/18.    Was an appointment offered for this call? Yes  If yes : Appointment type              Date    Preferred method for responding to this message: Telephone Call  What is your phone number ?[phone]    If we cannot reach you directly, may we leave a detailed response at the number you provided? Yes    Can this message wait until your PCP/provider returns, if available today? No,PCP is out     Nina Martinez

## 2018-08-31 DIAGNOSIS — I10 ESSENTIAL HYPERTENSION WITH GOAL BLOOD PRESSURE LESS THAN 140/90: ICD-10-CM

## 2018-08-31 NOTE — TELEPHONE ENCOUNTER
Lisinopril   Last Office Visit: 5/3/2018  Last Refill Date:8/6/2018  # 45          Refills  0      Thank you!

## 2018-08-31 NOTE — LETTER
September 5, 2018      Ena Lanza  [address]  [facility] [address]        Dear Ena,     We are concerned about your health care.  We recently provided you with medication refills.  A BMP lab test is required every year in order to continue refilling your lisinopril.  Orders have been placed in our computer and should be accessible at most [facility] labs. You WILL NOT need to be fasting for this lab. Please complete this as soon as possible. If you have any questions please call us at [phone].        Sincerely,  EFREN Troy

## 2018-09-04 ENCOUNTER — TELEPHONE (OUTPATIENT)
Dept: FAMILY MEDICINE | Facility: OTHER | Age: 78
End: 2018-09-04

## 2018-09-04 DIAGNOSIS — E11.65 TYPE 2 DIABETES MELLITUS WITH HYPERGLYCEMIA, WITHOUT LONG-TERM CURRENT USE OF INSULIN (H): ICD-10-CM

## 2018-09-04 NOTE — TELEPHONE ENCOUNTER
Reason for call:  Medication    1. Medication Name? lisinopril (PRINIVIL/ZESTRIL) 20 MG tablet and metFORMIN (GLUCOPHAGE-XR) 500 MG 24 hr tablet  2. Is this request for a refill? Yes  3. What Pharmacy do you use? [facility]  4. Have you contacted your pharmacy? Yes    5. If yes, when?  (Please note that the turn-around-time for prescriptions is 72 business hours; I am sending your request at this time. SEND TO  [facility]  )  Description: PT called and states that she has an appointment on 09/20/18 with Amanda Luu and she is going to run out of these would like to see if she could get these refilled up until this date.  Was an appointment offered for this a call? No   Preferred method for responding to this messageTelephone Call  If we cannot reach you directly, may we leave a detailed response at the number you provided? Yes  Can this message wait until your PCP/Provider returns if not available today? No, pcp is out

## 2018-09-05 RX ORDER — LISINOPRIL 20 MG/1
30 TABLET ORAL DAILY
Qty: 45 TABLET | Refills: 0 | Status: SHIPPED | OUTPATIENT
Start: 2018-09-05 | End: 2018-09-20

## 2018-09-05 RX ORDER — METFORMIN HCL 500 MG
TABLET, EXTENDED RELEASE 24 HR ORAL
Qty: 120 TABLET | Refills: 0 | Status: SHIPPED | OUTPATIENT
Start: 2018-09-05 | End: 2018-09-20

## 2018-09-05 NOTE — TELEPHONE ENCOUNTER
ACE Inhibitors (Including Combos) Protocol Failed  lisinopril (PRINIVIL/ZESTRIL) 20 MG tablet       Rerun Protocol (8/31/2018 10:23 AM)        Normal serum creatinine on file in past 12 months           Recent Labs   Lab Test  02/18/17   1806   CR  0.78                  Normal serum potassium on file in past 12 months           Recent Labs   Lab Test  02/18/17   1806   POTASSIUM  3.7                Lisinopril   Last Office Visit: 5/3/2018  Last Refill Date:8/6/2018  # 45          Refills  0

## 2018-09-18 DIAGNOSIS — I10 ESSENTIAL HYPERTENSION WITH GOAL BLOOD PRESSURE LESS THAN 140/90: ICD-10-CM

## 2018-09-18 DIAGNOSIS — E11.65 TYPE 2 DIABETES MELLITUS WITH HYPERGLYCEMIA, WITHOUT LONG-TERM CURRENT USE OF INSULIN (H): ICD-10-CM

## 2018-09-18 LAB
ANION GAP SERPL CALCULATED.3IONS-SCNC: 8 MMOL/L (ref 3–14)
BUN SERPL-MCNC: 20 MG/DL (ref 7–30)
CALCIUM SERPL-MCNC: 9.4 MG/DL (ref 8.5–10.1)
CHLORIDE SERPL-SCNC: 103 MMOL/L (ref 94–109)
CHOLEST SERPL-MCNC: 253 MG/DL
CO2 SERPL-SCNC: 27 MMOL/L (ref 20–32)
CREAT SERPL-MCNC: 0.79 MG/DL (ref 0.52–1.04)
CREAT UR-MCNC: 49 MG/DL
EST. AVERAGE GLUCOSE BLD GHB EST-MCNC: 140 MG/DL
GFR SERPL CREATININE-BSD FRML MDRD: 70 ML/MIN/1.7M2
GLUCOSE SERPL-MCNC: 133 MG/DL (ref 70–99)
HBA1C MFR BLD: 6.5 % (ref 0–5.6)
HDLC SERPL-MCNC: 75 MG/DL
LDLC SERPL CALC-MCNC: 138 MG/DL
MICROALBUMIN UR-MCNC: 72 MG/L
MICROALBUMIN/CREAT UR: 145.73 MG/G CR (ref 0–25)
NONHDLC SERPL-MCNC: 178 MG/DL
POTASSIUM SERPL-SCNC: 4.4 MMOL/L (ref 3.4–5.3)
SODIUM SERPL-SCNC: 138 MMOL/L (ref 133–144)
TRIGL SERPL-MCNC: 201 MG/DL

## 2018-09-18 PROCEDURE — 36415 COLL VENOUS BLD VENIPUNCTURE: CPT | Mod: ZL | Performed by: PHYSICIAN ASSISTANT

## 2018-09-18 PROCEDURE — 80061 LIPID PANEL: CPT | Mod: ZL | Performed by: PHYSICIAN ASSISTANT

## 2018-09-18 PROCEDURE — 82043 UR ALBUMIN QUANTITATIVE: CPT | Mod: ZL | Performed by: PHYSICIAN ASSISTANT

## 2018-09-18 PROCEDURE — 83036 HEMOGLOBIN GLYCOSYLATED A1C: CPT | Mod: ZL | Performed by: PHYSICIAN ASSISTANT

## 2018-09-18 PROCEDURE — 80048 BASIC METABOLIC PNL TOTAL CA: CPT | Mod: ZL | Performed by: PHYSICIAN ASSISTANT

## 2018-09-18 PROCEDURE — 40000788 ZZHCL STATISTIC ESTIMATED AVERAGE GLUCOSE: Mod: ZL | Performed by: PHYSICIAN ASSISTANT

## 2018-09-19 NOTE — PROGRESS NOTES
SUBJECTIVE:   Ena Lanza is a 78 year old female who presents to clinic today for the following health issues:      Hypertension Follow-up      Outpatient blood pressures are being checked at home.  Results are 140-150-160 /70-80-90 (varies).    Low Salt Diet: no added salt      Amount of exercise or physical activity: 6-7 days/week for an average of 30-45 minutes    Problems taking medications regularly: No    Medication side effects: none    Diet: regular (no restrictions) and low salt        Diabetes Follow-up      Patient is checking blood sugars: daily in the 120's.     Diabetic concerns: None     Symptoms of hypoglycemia (low blood sugar): none     Paresthesias (numbness or burning in feet) or sores: No     Date of last diabetic eye exam: appointment on October 16, 2018 with Dr. Ny.     BP Readings from Last 2 Encounters:   09/20/18 134/72   05/14/18 130/76     Hemoglobin A1C (%)   Date Value   09/18/2018 6.5 (H)   08/21/2017 5.8     LDL Cholesterol Calculated (mg/dL)   Date Value   09/18/2018 138 (H)   01/19/2017 107 (H)       Diabetes Management Resources  Hyperlipidemia Follow-Up      Rate your low fat/cholesterol diet?: good    Taking statin?  No    Other lipid medications/supplements?:  none      Problem list and histories reviewed & adjusted, as indicated.  Additional history: as documented    Patient Active Problem List   Diagnosis     Lesion of sciatic nerve     Hyperlipidemia with target LDL less than 100     HTN (hypertension)     DDD (degenerative disc disease), lumbar     Essential hypertension     Type 2 diabetes mellitus without complication (H)     ACP (advance care planning)     Newly recognized heart murmur     Essential hypertension with goal blood pressure less than 140/90     Herpes zoster with complication     Type 2 diabetes mellitus with hyperglycemia, without long-term current use of insulin (H)     Heart murmur     Aortic valve sclerosis     History reviewed. No pertinent  surgical history.    Social History   Substance Use Topics     Smoking status: Never Smoker     Smokeless tobacco: Never Used      Comment: yes passive exposure     Alcohol use No     Family History   Problem Relation Age of Onset     C.A.D. Father 71     cause of death     Cancer Father      melanoma     Coronary Artery Disease Father      Thyroid Disease Mother      Hypertension Mother      Thyroid Disease Sister      Other Cancer Sister      melanoma     Diabetes No family hx of      Hyperlipidemia No family hx of      Asthma No family hx of      Breast Cancer No family hx of      Colon Cancer No family hx of          Current Outpatient Prescriptions   Medication Sig Dispense Refill     aspirin 81 MG chewable tablet Take 1 tablet (81 mg) by mouth daily 108 tablet 3     blood glucose monitoring (ACCU-CHEK FASTCLIX) lancets Use to test blood sugar 2 times daily. 102 Box 5     blood glucose monitoring (ACCU-CHEK SMARTVIEW) test strip Use to test blood sugar 2 times daily or as directed 100 each 5     ibuprofen (ADVIL/MOTRIN) 800 MG tablet Take 1 tablet (800 mg) by mouth every 8 hours as needed for moderate pain 90 tablet 1     lisinopril (PRINIVIL/ZESTRIL) 20 MG tablet Take 1.5 tablets (30 mg) by mouth daily 45 tablet 0     metFORMIN (GLUCOPHAGE-XR) 500 MG 24 hr tablet TAKE 2 TABLETS BY MOUTH TWICE DAILY WITH MEALS 120 tablet 0     STATIN NOT PRESCRIBED, INTENTIONAL, Statin not prescribed intentionally due to Intolerance (with supporting documentation of trying a statin at least once within the last 5 years) 0 each 0     No Known Allergies  Recent Labs   Lab Test  09/18/18   0922  08/21/17   0844  02/18/17   1806  01/19/17   0916  06/13/16   1050  10/21/15   1008  01/26/15   1348   02/10/14   0945   A1C  6.5*  5.8   --   6.8*  6.7*  6.4*   --    < >  12.9*   LDL  138*   --    --   107*  110*  143*   --    < >   --    HDL  75   --    --   69  67  73   --    < >   --    TRIG  201*   --    --   168*  202*  199*   --     < >   --    ALT   --    --    --    --   18  27  34   --   27   CR  0.79   --   0.78  0.74  0.70  0.65  0.57   < >  0.70   GFRESTIMATED  70   --   72  77  81  89  >90  Non  GFR Calc     < >  82   GFRESTBLACK  85   --   88  >90   GFR Calc    >90   GFR Calc    >90  >90  African American GFR Calc     < >  >90   POTASSIUM  4.4   --   3.7  3.5  3.6  3.9  3.5   < >  3.8   TSH   --    --    --    --   1.03   --    --    --   1.58    < > = values in this interval not displayed.      BP Readings from Last 3 Encounters:   09/20/18 134/72   05/14/18 130/76   05/03/18 138/72    Wt Readings from Last 3 Encounters:   09/20/18 164 lb (74.4 kg)   05/14/18 160 lb (72.6 kg)   05/03/18 160 lb (72.6 kg)                    Reviewed and updated as needed this visit by clinical staff       Reviewed and updated as needed this visit by Provider         ROS:  Constitutional, neuro, ENT, endocrine, pulmonary, cardiac, gastrointestinal, genitourinary, musculoskeletal, integument and psychiatric systems are negative, except as otherwise noted.    OBJECTIVE:                                                    /72 (BP Location: Left arm, Patient Position: Chair, Cuff Size: Adult Regular)  Pulse 89  Temp 98.2  F (36.8  C) (Tympanic)  Wt 164 lb (74.4 kg)  SpO2 96%  BMI 28.15 kg/m2  Body mass index is 28.15 kg/(m^2).  GENERAL APPEARANCE: healthy, alert and no distress  EYES: Eyes grossly normal to inspection, PERRL and conjunctivae and sclerae normal  HENT: ear canals and TM's normal and nose and mouth without ulcers or lesions  NECK: no adenopathy, no asymmetry, masses, or scars and thyroid normal to palpation  RESP: lungs clear to auscultation - no rales, rhonchi or wheezes  CV: regular rates and rhythm, normal S1 S2, no S3 or S4 and no murmur, click or rub  MS: extremities normal- no gross deformities noted  SKIN: no suspicious lesions or rashes  PSYCH: mentation appears normal and affect  normal/bright.  Speech worsening in articulation.  Lori Novoa talking but no tremors noted.     Diagnostic test results:  Diagnostic Test Results:  Results for orders placed or performed in visit on 09/18/18   Lipid Profile (Chol, Trig, HDL, LDL calc)   Result Value Ref Range    Cholesterol 253 (H) <200 mg/dL    Triglycerides 201 (H) <150 mg/dL    HDL Cholesterol 75 >49 mg/dL    LDL Cholesterol Calculated 138 (H) <100 mg/dL    Non HDL Cholesterol 178 (H) <130 mg/dL   Hemoglobin A1c   Result Value Ref Range    Hemoglobin A1C 6.5 (H) 0 - 5.6 %   Albumin Random Urine Quantitative with Creat Ratio   Result Value Ref Range    Creatinine Urine 49 mg/dL    Albumin Urine mg/L 72 mg/L    Albumin Urine mg/g Cr 145.73 (H) 0 - 25 mg/g Cr   Basic metabolic panel   Result Value Ref Range    Sodium 138 133 - 144 mmol/L    Potassium 4.4 3.4 - 5.3 mmol/L    Chloride 103 94 - 109 mmol/L    Carbon Dioxide 27 20 - 32 mmol/L    Anion Gap 8 3 - 14 mmol/L    Glucose 133 (H) 70 - 99 mg/dL    Urea Nitrogen 20 7 - 30 mg/dL    Creatinine 0.79 0.52 - 1.04 mg/dL    GFR Estimate 70 >60 mL/min/1.7m2    GFR Estimate If Black 85 >60 mL/min/1.7m2    Calcium 9.4 8.5 - 10.1 mg/dL   Estimated Average Glucose   Result Value Ref Range    Estimated Average Glucose 140 mg/dL        ASSESSMENT/PLAN:                                                    1. Articulation disorder  Talking with difficulty in pronunciation voice quality and speed of works.  Voice has a tremor and her hand and other parts of her body do not.  Wants to try something to improve this.  He mother hd a similar speech patter and is hoping that she can't improve the speech, articulation and voice tone.   - SPEECH THERAPY REFERRAL; Future    2. Essential hypertension with goal blood pressure less than 140/90  She is due for a refill. We will be  Given goal and review of DASH.   She also has cholesterol issues from the past.  Not big into salt.  Weight is stable.  Recheck 6 months.    - lisinopril (PRINIVIL/ZESTRIL) 20 MG tablet; Take 1.5 tablets (30 mg) by mouth daily  Dispense: 45 tablet; Refill: 11    3. Type 2 diabetes mellitus with hyperglycemia, without long-term current use of insulin (H)  She will be refilled on Glucophage. Her sugars are good. Weight is stable. A1C has been in range.  Eye exam is up to date. Foot exam is negative.   - metFORMIN (GLUCOPHAGE-XR) 500 MG 24 hr tablet; TAKE 2 TABLETS BY MOUTH TWICE DAILY WITH MEALS  Dispense: 360 tablet; Refill: 3      See Patient Instructions    EFREN Troy  [facility] - [facility]

## 2018-09-20 ENCOUNTER — OFFICE VISIT (OUTPATIENT)
Dept: FAMILY MEDICINE | Facility: OTHER | Age: 78
End: 2018-09-20
Attending: PHYSICIAN ASSISTANT
Payer: COMMERCIAL

## 2018-09-20 VITALS
TEMPERATURE: 98.2 F | HEART RATE: 89 BPM | WEIGHT: 164 LBS | OXYGEN SATURATION: 96 % | DIASTOLIC BLOOD PRESSURE: 72 MMHG | SYSTOLIC BLOOD PRESSURE: 134 MMHG | BODY MASS INDEX: 28.15 KG/M2

## 2018-09-20 DIAGNOSIS — Z23 NEED FOR PROPHYLACTIC VACCINATION AND INOCULATION AGAINST INFLUENZA: ICD-10-CM

## 2018-09-20 DIAGNOSIS — E11.65 TYPE 2 DIABETES MELLITUS WITH HYPERGLYCEMIA, WITHOUT LONG-TERM CURRENT USE OF INSULIN (H): ICD-10-CM

## 2018-09-20 DIAGNOSIS — F80.0 ARTICULATION DISORDER: Primary | ICD-10-CM

## 2018-09-20 DIAGNOSIS — I10 ESSENTIAL HYPERTENSION WITH GOAL BLOOD PRESSURE LESS THAN 140/90: ICD-10-CM

## 2018-09-20 PROCEDURE — G0463 HOSPITAL OUTPT CLINIC VISIT: HCPCS

## 2018-09-20 PROCEDURE — G0463 HOSPITAL OUTPT CLINIC VISIT: HCPCS | Mod: 25

## 2018-09-20 PROCEDURE — 90662 IIV NO PRSV INCREASED AG IM: CPT | Performed by: PHYSICIAN ASSISTANT

## 2018-09-20 PROCEDURE — G0008 ADMIN INFLUENZA VIRUS VAC: HCPCS | Performed by: PHYSICIAN ASSISTANT

## 2018-09-20 PROCEDURE — 99214 OFFICE O/P EST MOD 30 MIN: CPT | Performed by: PHYSICIAN ASSISTANT

## 2018-09-20 RX ORDER — METFORMIN HCL 500 MG
TABLET, EXTENDED RELEASE 24 HR ORAL
Qty: 360 TABLET | Refills: 3 | Status: SHIPPED | OUTPATIENT
Start: 2018-09-20 | End: 2019-10-31

## 2018-09-20 RX ORDER — LISINOPRIL 20 MG/1
30 TABLET ORAL DAILY
Qty: 45 TABLET | Refills: 11 | Status: SHIPPED | OUTPATIENT
Start: 2018-09-20 | End: 2018-12-12

## 2018-09-20 ASSESSMENT — ANXIETY QUESTIONNAIRES
2. NOT BEING ABLE TO STOP OR CONTROL WORRYING: NOT AT ALL
6. BECOMING EASILY ANNOYED OR IRRITABLE: NOT AT ALL
7. FEELING AFRAID AS IF SOMETHING AWFUL MIGHT HAPPEN: NOT AT ALL
5. BEING SO RESTLESS THAT IT IS HARD TO SIT STILL: NOT AT ALL
GAD7 TOTAL SCORE: 2
1. FEELING NERVOUS, ANXIOUS, OR ON EDGE: SEVERAL DAYS
IF YOU CHECKED OFF ANY PROBLEMS ON THIS QUESTIONNAIRE, HOW DIFFICULT HAVE THESE PROBLEMS MADE IT FOR YOU TO DO YOUR WORK, TAKE CARE OF THINGS AT HOME, OR GET ALONG WITH OTHER PEOPLE: NOT DIFFICULT AT ALL
3. WORRYING TOO MUCH ABOUT DIFFERENT THINGS: NOT AT ALL
4. TROUBLE RELAXING: SEVERAL DAYS

## 2018-09-20 ASSESSMENT — PAIN SCALES - GENERAL: PAINLEVEL: MODERATE PAIN (4)

## 2018-09-20 NOTE — NURSING NOTE
The following medication was given:     MEDICATION: Influenza Vaccine Fluzone High Dose  ROUTE: IM  SITE: Deltoid - Left  DOSE: 0.5 mL  LOT #: [id number]  :  [employer]   EXPIRATION DATE:  4/6/2019  NDC#: 4981-403-88     Prior to injection verified patient identity using patient's name and date of birth.

## 2018-09-20 NOTE — PROGRESS NOTES

## 2018-09-20 NOTE — PATIENT INSTRUCTIONS
Thank you for choosing [facility].   I have office hours 8:00 am to 4:30 pm on Monday's, Wednesday's, Thursday's and Friday's. My nurse and I are out of the office every Tuesday.    Following your visit, when your labs and diagnostic testing have returned, I will review then and you will be contacted by my nurse.  If you are on My Chart, you can also view results there.    For refills, notify your pharmacy regarding what you need and the pharmacy will generate a refill request. Do not call my nurse as she is unable to process refill request. Please plan ahead and allow 3-5 days for refill requests.    You will generally receive a reminder call the day prior to your appointment.  If you cannot attend your appointment, please cancel your appointment with as much notice as possible.  If there is a pattern of failure to present for your appointments, I cannot provide consistent, meaningful, ongoing care for you. It is very important to me that you come in for your care, so we can best assist you with your health care needs.    IMPORTANT:  Please note that it is my standard of practice to NOT participate in prescribing ongoing requested Narcotic Analgesic therapy, and/or participate in the prescribing of other controlled substances.  My nurse and I am happy to assist you with the process of referral for alternative pain management as needed, and other treatment modalities including but not limited to:  Physical Therapy, Physical Medicine and Rehab, Counseling, Chiropractic Care, Orthopedic Care, and non-narcotic medication management.     In the event that you need to be seen for emergent concerns and I am out of office,  please see one of my colleagues for acute concerns.  You may also present to  or ER.  I appreciate the opportunity to serve you and look forward to supporting your healthcare needs in the future. Please contact me with any questions or concerns that you may  have.    Sincerely,      Amanda Luu RN, PA-C     Orders Only on 09/18/2018   Component Date Value Ref Range Status     Cholesterol 09/18/2018 253* <200 mg/dL Final    Desirable:       <200 mg/dl     Triglycerides 09/18/2018 201* <150 mg/dL Final    Comment: Borderline high:  150-199 mg/dl  High:             200-499 mg/dl  Very high:       >499 mg/dl  Fasting specimen       HDL Cholesterol 09/18/2018 75  >49 mg/dL Final     LDL Cholesterol Calculated 09/18/2018 138* <100 mg/dL Final    Comment: Above desirable:  100-129 mg/dl  Borderline High:  130-159 mg/dL  High:             160-189 mg/dL  Very high:       >189 mg/dl       Non HDL Cholesterol 09/18/2018 178* <130 mg/dL Final    Comment: Above Desirable:  130-159 mg/dl  Borderline high:  160-189 mg/dl  High:             190-219 mg/dl  Very high:       >219 mg/dl       Hemoglobin A1C 09/18/2018 6.5* 0 - 5.6 % Final    Comment: Normal <5.7% Prediabetes 5.7-6.4%  Diabetes 6.5% or higher - adopted from ADA   consensus guidelines.       Creatinine Urine 09/18/2018 49  mg/dL Final     Albumin Urine mg/L 09/18/2018 72  mg/L Final     Albumin Urine mg/g Cr 09/18/2018 145.73* 0 - 25 mg/g Cr Final     Sodium 09/18/2018 138  133 - 144 mmol/L Final     Potassium 09/18/2018 4.4  3.4 - 5.3 mmol/L Final     Chloride 09/18/2018 103  94 - 109 mmol/L Final     Carbon Dioxide 09/18/2018 27  20 - 32 mmol/L Final     Anion Gap 09/18/2018 8  3 - 14 mmol/L Final     Glucose 09/18/2018 133* 70 - 99 mg/dL Final    Fasting specimen     Urea Nitrogen 09/18/2018 20  7 - 30 mg/dL Final     Creatinine 09/18/2018 0.79  0.52 - 1.04 mg/dL Final     GFR Estimate 09/18/2018 70  >60 mL/min/1.7m2 Final    Non  GFR Calc     GFR Estimate If Black 09/18/2018 85  >60 mL/min/1.7m2 Final    African American GFR Calc     Calcium 09/18/2018 9.4  8.5 - 10.1 mg/dL Final     Estimated Average Glucose 09/18/2018 140  mg/dL Final

## 2018-09-20 NOTE — NURSING NOTE
"Chief Complaint   Patient presents with     Hypertension       Initial /72 (BP Location: Left arm, Patient Position: Chair, Cuff Size: Adult Regular)  Pulse 89  Temp 98.2  F (36.8  C) (Tympanic)  Wt 164 lb (74.4 kg)  SpO2 96%  BMI 28.15 kg/m2 Estimated body mass index is 28.15 kg/(m^2) as calculated from the following:    Height as of 5/14/18: 5' 4\" (1.626 m).    Weight as of this encounter: 164 lb (74.4 kg).  Medication Reconciliation: complete    Rebecca Leyva    "

## 2018-09-20 NOTE — MR AVS SNAPSHOT
After Visit Summary   9/20/2018    Ena Lanza    MRN: [id number]           Patient Information     Date Of Birth          1940        Visit Information        Provider Department      9/20/2018 2:00 PM Amanda Luu PA [facility]        Today's Diagnoses     Articulation disorder    -  1    Essential hypertension with goal blood pressure less than 140/90        Type 2 diabetes mellitus with hyperglycemia, without long-term current use of insulin (H)          Care Instructions      Thank you for choosing [facility].   I have office hours 8:00 am to 4:30 pm on Monday's, Wednesday's, Thursday's and Friday's. My nurse and I are out of the office every Tuesday.    Following your visit, when your labs and diagnostic testing have returned, I will review then and you will be contacted by my nurse.  If you are on My Chart, you can also view results there.    For refills, notify your pharmacy regarding what you need and the pharmacy will generate a refill request. Do not call my nurse as she is unable to process refill request. Please plan ahead and allow 3-5 days for refill requests.    You will generally receive a reminder call the day prior to your appointment.  If you cannot attend your appointment, please cancel your appointment with as much notice as possible.  If there is a pattern of failure to present for your appointments, I cannot provide consistent, meaningful, ongoing care for you. It is very important to me that you come in for your care, so we can best assist you with your health care needs.    IMPORTANT:  Please note that it is my standard of practice to NOT participate in prescribing ongoing requested Narcotic Analgesic therapy, and/or participate in the prescribing of other controlled substances.  My nurse and I am happy to assist you with the process of referral for alternative pain management as needed, and other treatment modalities including  but not limited to:  Physical Therapy, Physical Medicine and Rehab, Counseling, Chiropractic Care, Orthopedic Care, and non-narcotic medication management.     In the event that you need to be seen for emergent concerns and I am out of office,  please see one of my colleagues for acute concerns.  You may also present to UC or ER.  I appreciate the opportunity to serve you and look forward to supporting your healthcare needs in the future. Please contact me with any questions or concerns that you may have.    Sincerely,      Amanda Luu RN, PA-C     Orders Only on 09/18/2018   Component Date Value Ref Range Status     Cholesterol 09/18/2018 253* <200 mg/dL Final    Desirable:       <200 mg/dl     Triglycerides 09/18/2018 201* <150 mg/dL Final    Comment: Borderline high:  150-199 mg/dl  High:             200-499 mg/dl  Very high:       >499 mg/dl  Fasting specimen       HDL Cholesterol 09/18/2018 75  >49 mg/dL Final     LDL Cholesterol Calculated 09/18/2018 138* <100 mg/dL Final    Comment: Above desirable:  100-129 mg/dl  Borderline High:  130-159 mg/dL  High:             160-189 mg/dL  Very high:       >189 mg/dl       Non HDL Cholesterol 09/18/2018 178* <130 mg/dL Final    Comment: Above Desirable:  130-159 mg/dl  Borderline high:  160-189 mg/dl  High:             190-219 mg/dl  Very high:       >219 mg/dl       Hemoglobin A1C 09/18/2018 6.5* 0 - 5.6 % Final    Comment: Normal <5.7% Prediabetes 5.7-6.4%  Diabetes 6.5% or higher - adopted from ADA   consensus guidelines.       Creatinine Urine 09/18/2018 49  mg/dL Final     Albumin Urine mg/L 09/18/2018 72  mg/L Final     Albumin Urine mg/g Cr 09/18/2018 145.73* 0 - 25 mg/g Cr Final     Sodium 09/18/2018 138  133 - 144 mmol/L Final     Potassium 09/18/2018 4.4  3.4 - 5.3 mmol/L Final     Chloride 09/18/2018 103  94 - 109 mmol/L Final     Carbon Dioxide 09/18/2018 27  20 - 32 mmol/L Final     Anion Gap 09/18/2018 8  3 - 14 mmol/L Final     Glucose 09/18/2018 133*  70 - 99 mg/dL Final    Fasting specimen     Urea Nitrogen 09/18/2018 20  7 - 30 mg/dL Final     Creatinine 09/18/2018 0.79  0.52 - 1.04 mg/dL Final     GFR Estimate 09/18/2018 70  >60 mL/min/1.7m2 Final    Non  GFR Calc     GFR Estimate If Black 09/18/2018 85  >60 mL/min/1.7m2 Final    African American GFR Calc     Calcium 09/18/2018 9.4  8.5 - 10.1 mg/dL Final     Estimated Average Glucose 09/18/2018 140  mg/dL Final               Follow-ups after your visit        Additional Services     SPEECH THERAPY REFERRAL       If you have not heard from the scheduling office within 2 business days, please call [phone] for all locations, with the exception of [place], please call [phone] and [facility], please call [phone].    Please be aware that coverage of these services is subject to the terms and limitations of your health insurance plan.  Call member services at your health plan with any benefit or coverage questions.                  Future tests that were ordered for you today     Open Future Orders        Priority Expected Expires Ordered    SPEECH THERAPY REFERRAL Routine  9/20/2019 9/20/2018            Who to contact     If you have questions or need follow up information about today's clinic visit or your schedule please contact [facility] directly at [phone].  Normal or non-critical lab and imaging results will be communicated to you by MyChart, letter or phone within 4 business days after the clinic has received the results. If you do not hear from us within 7 days, please contact the clinic through MyChart or phone. If you have a critical or abnormal lab result, we will notify you by phone as soon as possible.  Submit refill requests through [facility] or call your pharmacy and they will forward the refill request to us. Please allow 3 business days for your refill to be completed.          Additional Information About Your Visit        Care EveryWhere ID      This is your Care EveryWhere ID. This could be used by other organizations to access your [facility] medical records  [id number]        Your Vitals Were     Pulse Temperature Pulse Oximetry BMI (Body Mass Index)          89 98.2  F (36.8  C) (Tympanic) 96% 28.15 kg/m2         Blood Pressure from Last 3 Encounters:   09/20/18 134/72   05/14/18 130/76   05/03/18 138/72    Weight from Last 3 Encounters:   09/20/18 164 lb (74.4 kg)   05/14/18 160 lb (72.6 kg)   05/03/18 160 lb (72.6 kg)                 Where to get your medicines      These medications were sent to "[facility], [place] - [address] AT [facility] [address]  [address], [facility] [address]     Phone:  [phone]     lisinopril 20 MG tablet    metFORMIN 500 MG 24 hr tablet          Primary Care Provider Office Phone # Fax #    EFREN Salinas [phone]       [address] [place] [id number]        Equal Access to Services     SARAH FRANCO AH: Hadii aad ku hadasho Soomaali, waaxda luqadaha, qaybta kaalmada adeegyada, jasmina li . So [facility] [phone].    ATENCIÓN: Si habla español, tiene a pugh disposición servicios gratuitos de asistencia lingüística. Llame al [phone].    We comply with applicable federal civil rights laws and [place] laws. We do not discriminate on the basis of race, color, national origin, age, disability, sex, sexual orientation, or gender identity.            Thank you!     Thank you for choosing [facility]  for your care. Our goal is always to provide you with excellent care. Hearing back from our patients is one way we can continue to improve our services. Please take a few minutes to complete the written survey that you may receive in the mail after your visit with us. Thank you!             Your Updated Medication List - Protect others around you: Learn how to safely use, store and throw away your medicines  at www.disposemymeds.org.          This list is accurate as of 9/20/18  2:49 PM.  Always use your most recent med list.                   Brand Name Dispense Instructions for use Diagnosis    aspirin 81 MG chewable tablet     108 tablet    Take 1 tablet (81 mg) by mouth daily        blood glucose monitoring lancets     102 Box    Use to test blood sugar 2 times daily.    Type 2 diabetes mellitus with hyperglycemia, without long-term current use of insulin (H)       blood glucose monitoring test strip    ACCU-CHEK SMARTVIEW    100 each    Use to test blood sugar 2 times daily or as directed    Type 2 diabetes mellitus with hyperglycemia, without long-term current use of insulin (H)       ibuprofen 800 MG tablet    ADVIL/MOTRIN    90 tablet    Take 1 tablet (800 mg) by mouth every 8 hours as needed for moderate pain    Lumbago       lisinopril 20 MG tablet    PRINIVIL/ZESTRIL    45 tablet    Take 1.5 tablets (30 mg) by mouth daily    Essential hypertension with goal blood pressure less than 140/90       metFORMIN 500 MG 24 hr tablet    GLUCOPHAGE-XR    360 tablet    TAKE 2 TABLETS BY MOUTH TWICE DAILY WITH MEALS    Type 2 diabetes mellitus with hyperglycemia, without long-term current use of insulin (H)       STATIN NOT PRESCRIBED (INTENTIONAL)     0 each    Statin not prescribed intentionally due to Intolerance (with supporting documentation of trying a statin at least once within the last 5 years)    Type 2 diabetes mellitus without complication (H), Hyperlipidemia with target LDL less than 100

## 2018-09-21 ASSESSMENT — ANXIETY QUESTIONNAIRES: GAD7 TOTAL SCORE: 2

## 2018-09-21 ASSESSMENT — PATIENT HEALTH QUESTIONNAIRE - PHQ9: SUM OF ALL RESPONSES TO PHQ QUESTIONS 1-9: 1

## 2018-10-02 ENCOUNTER — HOSPITAL ENCOUNTER (OUTPATIENT)
Dept: SPEECH THERAPY | Facility: HOSPITAL | Age: 78
Setting detail: THERAPIES SERIES
End: 2018-10-02
Attending: PHYSICIAN ASSISTANT
Payer: MEDICARE

## 2018-10-02 DIAGNOSIS — R49.0 DYSPHONIA: Primary | ICD-10-CM

## 2018-10-02 DIAGNOSIS — F80.0 ARTICULATION DISORDER: ICD-10-CM

## 2018-10-02 PROCEDURE — 40000211 ZZHC STATISTIC SLP  DEPARTMENT VISIT

## 2018-10-02 PROCEDURE — G9171 VOICE CURRENT STATUS: HCPCS | Mod: GN,CM

## 2018-10-02 PROCEDURE — G9172 VOICE GOAL STATUS: HCPCS | Mod: GN,CI

## 2018-10-02 PROCEDURE — 92524 BEHAVRAL QUALIT ANALYS VOICE: CPT | Mod: GN

## 2018-10-03 NOTE — PROGRESS NOTES
10/02/18 1200       Present No   Comments Patient attended session alone   General Information   Type of Evaluation Voice   Type Of Visit Initial   Start Of Care Date 10/02/18   Referring Physician EFREN Mccloud   Orders Evaluate And Treat   Orders Comment dysphonia, problems with voice production, voice tremor similar to Shaunna Novoa   Medical Diagnosis Articulation Disorder   Onset Of Illness/injury Or Date Of Surgery 04/02/18   Precautions/Limitations no known precautions/limitations   Hearing WFL   Surgical/Medical history reviewed Yes   Pertinent History Of Current Problem Patient reported a recent onset of voice unsteadiness with onset approximately 6 months prior.  Patient reported that she is required to talk louder due to vocal degrade and that she has discontinued singing at [employer] due to vocal problem.  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  Patient denied swallowing and reflux problems.  She reported that she has a dry mouth and globus sensation below the level of the larynx which requires her to clear her throat without success.  Patient reported no history of intubations, trachs, or breathing difficulties.  Patient reported that her mother had a similar voice which was relieved following thyroid treatment medications but that the patient's thyroid was tested in 2016 without problems.   Prior Level Of Function Comment Patient reported no voicing problem prior to onset   Patient Role/employment History Retired;Other/comments  (Pt volunteers 3+ times per wk)   Living environment [facility]   General Observations Patient was pleasant, cooperative, and demonstrated frustration with voice tremor   Patient/family Goals Patient would like to talk with baseline voice   Fall Risk Screen   Fall screen completed by SLP   Have you fallen 2 or more times in the past year? No   Have you fallen  and had an injury in the past year? No   Is patient a fall risk? No   Pain Assessment   Pain Reported No   Oral Motor Sensory Function   Deficits noted in Labial Function (m-VII, S-V) None   Deficits noted in Lingual Function (m-XII, s-V, VII, IX, XII) None   Deficits noted in Mandibular Function (m-V) None   Deficits noted in Velar Function (m-V, X, XI, s-IX) None   Deficits noted in Laryngeal Function (m-X, s-X) Phonation;Respiration  (vocal and breath arrests)   Comments Patient demonstrating oromusculature appropriate for speaking   Speech   Deficits in Speech Respiration Other (Comment)  (intermittent respiratory-phonatory arrests)   Deficits in Phonation Other (Comment)  (vocal tremor, strangled vocal quality)   Sustained vowel production 13   S/Z Ratio 0.5   Deficits in Articulation None   Deficits in Resonance Hyponasal  (intermittent)   Deficits in Prosody Disordered stress patterns  (due to vocal and respiratory arrests)   AIDS-words, % intelligible 100   AIDS-sentences, % intelligible 100   Apraxia Battery:  Sounds (out of 10 possible) 10   Apraxia Battery:  Word Repetition - single syllable (out of 10 possible) 10   Apraxia Battery:  Increasing word length (out of 10 possible) 10   Apraxia Battery:  Word Repetition - mulitple syllables (out of 10 possible) 10   Apraxia Battery:  Repeat Sentences (out of 5 possible) 5   Speech Comments During brief instances the patient was unable to speak due to decreased respiratory drive in which her voice would stop but her articulators would continue in a groping like fashion. The patient was tested for verbal apraxia and she passed all tasks from the word to sentence level thus the patient is not apraxic.  The patient passed the nonverbal apraxia battery.   Language: Verbal Expression (use of spoken language to express information)   Comments (Verbal Expression) Pt had no difficulty expressing herself in terms of appropriate expressive language use.   Pragmatics  (the social or functional use of a language)   Deficits noted in Nonverbal None   Cognitive Status Examination   Cognitive Status Exam Comments No concerns noted   Education Assessment   Barriers to Learning No barriers   Preferred Learning Style Reading;Demonstration;Pictures/video;Listening   General Therapy Interventions   Planned Therapy Interventions Voice   Voice Relaxed breath sequence techniques;Larynx movement/coordination;Voice quality/pitch or volume tasks   Clinical Impression, SLP Eval   Criteria for Skilled Therapeutic Interventions Met yes;treatment indicated   SLP Diagnosis Spasmodic Dysphonia, Essential Tremor   Functional limitations due to impairments Patient is unable to communicate her wants and needs in natural environments which involve competing environmental noise due to low respiratory support during vocal tremors and flutter   Rehab potential affected by home practice compliance, therapy attendance   Therapy Frequency other (see comments);1 time;per week  (Pt driving from [facility] and is unable to attend 2x wk)   Predicted Duration of Therapy Intervention (days/wks) 13 weeks   Risks and Benefits of Treatment have been explained. Yes   Patient, Family & other staff in agreement with plan of care Yes   Clinical Impression Comments Patient is demonstrating vocal characteristics similar to spasmodic dysphonia and essential voice tremor.  The patient was demonstrating disordered phonatory-respiratory coordination due to rhythmic tremors and intermittent flutter which result in vocal and respiratory arrests, insufficient air support during talking, varying loudness, and an intermittent strangled-effortful voice.   The patient's vocal tremor and flutter interfere with her speech rate and stress pattern.  During brief instances the patient was unable to speak due to decreased respiratory drive in which her voice would stop but her articulators would continue in a groping type fashion. The patient  was tested for both nonverbal and verbal apraxia and she passed all tasks from the word to sentence level thus the patient is not apraxic.  It was observed and in agreement with patient's report that she increased speech success with producing a louder voice and talking at a one word level.  The patient dislikes talking loudly and would like to acheive a normal vocal qulaity without loudness.  She was educated regarding the utility of using a loud voice during therapy tasks and is in agreement for structured loudness tasks as needed.  SLP services are recommended due to the patient's vocal quality which interferes with her daily functioning as she has great difficulty talking over the phone and relaying information due to he obvious voice dysfunction.  A referral was requested from her PA for ENT to rule out vocal pathologies and to further address the etiology of the patient's vocal tremor and flutter.     Cognitive/Communication Goals   Cognitive/Communication Goals 1;2;3   Cognitive/Communication Goal 1   Goal Identifier LTG   Goal Description Patient will demonstrate a nondisordered vocal quality to untrained ear with independent use of compensations to assist voicing.   Target Date 12/31/18   Cognitive/Communication Goal 2   Goal Identifier STG 1   Goal Description Patient will demonstrate 80% success within semistructured tasks with breath-to-sound flow and adduction tasks at the sentence level following minimal support   Target Date 12/31/18   Cognitive/Communication Goal 3   Goal Identifier STG 2   Goal Description Patient will demonstrate 90% success with appropriate respiratory support for talking within semi-structured sentence length tasks with minimal support   Target Date 12/31/18   Total Session Time   Total Evaluation Time 60   Therapy Certification   Certification date from 10/02/18   Certification date to 12/31/18   Medical Diagnosis Dsyphonia   Certification I certify the need for these services  furnished under this plan of treatment and while under my care.  (Physician co-signature of this document indicates review and certification of the therapy plan).   SLP Medicare Only G-code   G-code Voice   Voice   Voice: Current Status , Goal , Discharge -Hecd Only-Modifier the same for all G-codes CM: 80-99% impairment   Voice: Current & Discharge Modifier Rationale-Eval Only clinical expertise, NICK guidelines, performance compared to age matched peers

## 2018-10-11 DIAGNOSIS — I10 ESSENTIAL HYPERTENSION WITH GOAL BLOOD PRESSURE LESS THAN 140/90: ICD-10-CM

## 2018-10-15 RX ORDER — LISINOPRIL 20 MG/1
TABLET ORAL
Qty: 135 TABLET | Refills: 0 | OUTPATIENT
Start: 2018-10-15

## 2018-10-25 ENCOUNTER — OFFICE VISIT (OUTPATIENT)
Dept: OTOLARYNGOLOGY | Facility: OTHER | Age: 78
End: 2018-10-25
Attending: PHYSICIAN ASSISTANT
Payer: MEDICARE

## 2018-10-25 VITALS
BODY MASS INDEX: 26.46 KG/M2 | HEART RATE: 85 BPM | HEIGHT: 64 IN | WEIGHT: 155 LBS | SYSTOLIC BLOOD PRESSURE: 140 MMHG | DIASTOLIC BLOOD PRESSURE: 80 MMHG | TEMPERATURE: 97.8 F

## 2018-10-25 DIAGNOSIS — R49.0 DYSPHONIA: Primary | ICD-10-CM

## 2018-10-25 DIAGNOSIS — R49.8 VOCAL TREMOR: ICD-10-CM

## 2018-10-25 PROCEDURE — 99214 OFFICE O/P EST MOD 30 MIN: CPT | Mod: 25 | Performed by: PHYSICIAN ASSISTANT

## 2018-10-25 PROCEDURE — G0463 HOSPITAL OUTPT CLINIC VISIT: HCPCS | Mod: 25

## 2018-10-25 PROCEDURE — 31575 DIAGNOSTIC LARYNGOSCOPY: CPT | Performed by: PHYSICIAN ASSISTANT

## 2018-10-25 ASSESSMENT — PAIN SCALES - GENERAL: PAINLEVEL: NO PAIN (0)

## 2018-10-25 NOTE — PATIENT INSTRUCTIONS
Complete speech therapy  Maintain good water intake  Limit caffeine.   Good mobility of vocal cords were mobile and symmetric. No mass.     Follow up after speech therapy  If symptoms worsen or do not improve- consider neurology consult     Thank you for allowing EFREN Mendez and our ENT team to participate in your care.  If your medications are too expensive, please give the nurse a call.  We can possibly change this medication.  If you have a scheduling or an appointment question please contact our Health Unit Coordinator at their direct line [phone].   ALL nursing questions or concerns can be directed to your ENT nurse at: [phone] [place]

## 2018-10-25 NOTE — NURSING NOTE
"Chief Complaint   Patient presents with     Throat Problem     Pt has been referred by Jus for articulation disorder and dysphonia.  Pt was ordered speech therapy.       Initial /80 (BP Location: Left arm, Cuff Size: Adult Large)  Pulse 85  Temp 97.8  F (36.6  C) (Tympanic)  Ht 1.626 m (5' 4\")  Wt 70.3 kg (155 lb)  BMI 26.61 kg/m2 Estimated body mass index is 26.61 kg/(m^2) as calculated from the following:    Height as of this encounter: 1.626 m (5' 4\").    Weight as of this encounter: 70.3 kg (155 lb).  Medication Reconciliation: complete    Shereen Miguel LPN    "

## 2018-10-25 NOTE — MR AVS SNAPSHOT
After Visit Summary   10/25/2018    Ena Lanza    MRN: [id number]           Patient Information     Date Of Birth          1940        Visit Information        Provider Department      10/25/2018 1:15 PM Annabelle Mckeon PA-C [facility]        Today's Diagnoses     Dysphonia    -  1    Vocal tremor          Care Instructions    Complete speech therapy  Maintain good water intake  Limit caffeine.   Good mobility of vocal cords were mobile and symmetric. No mass.     Follow up after speech therapy  If symptoms worsen or do not improve- consider neurology consult     Thank you for allowing EFREN Mendez and our ENT team to participate in your care.  If your medications are too expensive, please give the nurse a call.  We can possibly change this medication.  If you have a scheduling or an appointment question please contact our Health Unit Coordinator at their direct line [phone].   ALL nursing questions or concerns can be directed to your ENT nurse at: [phone] [facility]              Follow-ups after your visit        Your next 10 appointments already scheduled     Oct 30, 2018 10:30 AM CDT   Voice Treatment with YOHANNES Enriquez   Speech Therapy ([facility] )    [address] [address]   [phone]            Nov 06, 2018 10:30 AM CST   Voice Treatment with YOHANNES Enriquez   Speech Therapy ([facility] )    [address] [address]   [phone]            Nov 13, 2018 10:30 AM CST   Voice Treatment with YOHANNES Enriquez   Speech Therapy ([facility] )    [address] [address]   [phone]            Nov 20, 2018 10:30 AM CST   Voice Treatment with YOHANNES Enriquez   Speech Therapy ([facility] )    [address] [address]   [phone]            Nov 27, 2018 10:30 AM CST   Voice Treatment with YOHANNES Enriquez   Speech Therapy ([place]  [facility] )    [address] [address]   [phone]            Dec 04, 2018 10:30 AM CST   Voice Treatment with YOHANNES Enriquez   Speech Therapy ([facility] )    [address] [address]   [phone]            Dec 11, 2018 10:30 AM CST   Voice Treatment with YOHANNES Enriquez   Speech Therapy ([facility] )    [address] [address]   [phone]              Who to contact     If you have questions or need follow up information about today's clinic visit or your schedule please contact [facility] directly at [phone].  Normal or non-critical lab and imaging results will be communicated to you by MyChart, letter or phone within 4 business days after the clinic has received the results. If you do not hear from us within 7 days, please contact the clinic through MyChart or phone. If you have a critical or abnormal lab result, we will notify you by phone as soon as possible.  Submit refill requests through [email or web address] or call your pharmacy and they will forward the refill request to us. Please allow 3 business days for your refill to be completed.          Additional Information About Your Visit        Care EveryWhere ID     This is your Care EveryWhere ID. This could be used by other organizations to access your [facility] medical records  [id number]        Your Vitals Were     Pulse Temperature Height BMI (Body Mass Index)          85 97.8  F (36.6  C) (Tympanic) 1.626 m (5' 4\") 26.61 kg/m2         Blood Pressure from Last 3 Encounters:   10/25/18 140/80   09/20/18 134/72   05/14/18 130/76    Weight from Last 3 Encounters:   10/25/18 70.3 kg (155 lb)   09/20/18 74.4 kg (164 lb)   05/14/18 72.6 kg (160 lb)              Today, you had the following     No orders found for display       Primary Care Provider Office Phone # Fax #    EFREN Salinas [phone]       [address] [place] [address] " [address]  [facility] [address]        Equal Access to Services     [facility] : Hadii emmanuel ku hadgavino Somichelaali, waaxda luqadaha, qaybta kaalmada kandi, jasmina sotelooskarelsi juan. So [facility] [phone].    ATENCIÓN: Si habla español, tiene a pugh disposición servicios gratuitos de asistencia lingüística. Llame al [phone].    We comply with applicable federal civil rights laws and [place] laws. We do not discriminate on the basis of race, color, national origin, age, disability, sex, sexual orientation, or gender identity.            Thank you!     Thank you for choosing [facility]  for your care. Our goal is always to provide you with excellent care. Hearing back from our patients is one way we can continue to improve our services. Please take a few minutes to complete the written survey that you may receive in the mail after your visit with us. Thank you!             Your Updated Medication List - Protect others around you: Learn how to safely use, store and throw away your medicines at www.disposemymeds.org.          This list is accurate as of 10/25/18  1:55 PM.  Always use your most recent med list.                   Brand Name Dispense Instructions for use Diagnosis    aspirin 81 MG chewable tablet     108 tablet    Take 1 tablet (81 mg) by mouth daily        blood glucose monitoring lancets     102 Box    Use to test blood sugar 2 times daily.    Type 2 diabetes mellitus with hyperglycemia, without long-term current use of insulin (H)       blood glucose monitoring test strip    ACCU-CHEK SMARTVIEW    100 each    Use to test blood sugar 2 times daily or as directed    Type 2 diabetes mellitus with hyperglycemia, without long-term current use of insulin (H)       ibuprofen 800 MG tablet    ADVIL/MOTRIN    90 tablet    Take 1 tablet (800 mg) by mouth every 8 hours as needed for moderate pain    Lumbago       lisinopril 20 MG tablet    PRINIVIL/ZESTRIL    45 tablet    Take  1.5 tablets (30 mg) by mouth daily    Essential hypertension with goal blood pressure less than 140/90       metFORMIN 500 MG 24 hr tablet    GLUCOPHAGE-XR    360 tablet    TAKE 2 TABLETS BY MOUTH TWICE DAILY WITH MEALS    Type 2 diabetes mellitus with hyperglycemia, without long-term current use of insulin (H)       STATIN NOT PRESCRIBED (INTENTIONAL)     0 each    Statin not prescribed intentionally due to Intolerance (with supporting documentation of trying a statin at least once within the last 5 years)    Type 2 diabetes mellitus without complication (H), Hyperlipidemia with target LDL less than 100

## 2018-10-25 NOTE — LETTER
10/25/2018         RE: Ena Lanza  [id number] [place] [address]        Dear Colleague,    Thank you for referring your patient, Ena Lanza, to the [facility]. Please see a copy of my visit note below.    Otolaryngology Consultation    Patient: Ena Lanza  : 1940    Patient presents with:  Throat Problem: Pt has been referred by Jus for articulation disorder and dysphonia.  Pt was ordered speech therapy.      HPI:  Ena Lanza is a 78 year old female seen today for concerns of dysphonia. She has felt that increase in symptoms over the last 6 months. She is speaking and feels that her words are getting cut off.   Ena has dry throat feeling. She does clear her throat, but this could be habitual.   She has noticed an increase in symptoms while on the phone. She went to  and had one session. She is scheduled for 10 appointments for .     No aphonia.     No chronic cough.   Denies throat pain.   Denies dysphagia.   Denies allergies. Denies post nasal drip.   Denies sinusitis. Denies cervical or neck pain. No decrease in ROM.   Denies facial numbness or weakness.       Drinks plenty of water. 3-4 cups coffee daily.   Never tobacco user.   ETOH- None.   Denies reflux.     Current Outpatient Rx   Medication Sig Dispense Refill     aspirin 81 MG chewable tablet Take 1 tablet (81 mg) by mouth daily 108 tablet 3     blood glucose monitoring (ACCU-CHEK FASTCLIX) lancets Use to test blood sugar 2 times daily. 102 Box 5     blood glucose monitoring (ACCU-CHEK SMARTVIEW) test strip Use to test blood sugar 2 times daily or as directed 100 each 5     ibuprofen (ADVIL/MOTRIN) 800 MG tablet Take 1 tablet (800 mg) by mouth every 8 hours as needed for moderate pain 90 tablet 1     lisinopril (PRINIVIL/ZESTRIL) 20 MG tablet Take 1.5 tablets (30 mg) by mouth daily 45 tablet 11     metFORMIN (GLUCOPHAGE-XR) 500 MG 24 hr tablet TAKE 2 TABLETS BY MOUTH TWICE DAILY WITH MEALS 360  "tablet 3     STATIN NOT PRESCRIBED, INTENTIONAL, Statin not prescribed intentionally due to Intolerance (with supporting documentation of trying a statin at least once within the last 5 years) 0 each 0       Allergies: Review of patient's allergies indicates no known allergies.     Past Medical History:   Diagnosis Date     Anxiety     no treatment     Essential hypertension 10/5/2015     Essential hypertension with goal blood pressure less than 140/90 6/13/2016     Herpes zoster with complication 6/13/2016     HTN (hypertension) 10/29/2014     Hyperlipidemia LDL goal < 100 10/29/2014     Hyperlipidemia LDL goal < 100 10/29/2014     Lesion of sciatic nerve 8/27/2014     Newly recognized heart murmur 6/13/2016     Type 2 diabetes mellitus with hyperglycemia, without long-term current use of insulin (H) 11/28/2016     Type 2 diabetes mellitus without complication (H) 10/5/2015       History reviewed. No pertinent surgical history.    ENT family history reviewed    Social History   Substance Use Topics     Smoking status: Never Smoker     Smokeless tobacco: Never Used      Comment: yes passive exposure     Alcohol use No       Review of Systems  ROS: 10 point ROS neg other than the symptoms noted above in the HPI     Physical Exam  /80 (BP Location: Left arm, Cuff Size: Adult Large)  Pulse 85  Temp 97.8  F (36.6  C) (Tympanic)  Ht 1.626 m (5' 4\")  Wt 70.3 kg (155 lb)  BMI 26.61 kg/m2  General - The patient is well nourished and well developed, and appears to have good nutritional status.  Alert and oriented to person and place, answers questions and cooperates with examination appropriately.   Head and Face - Normocephalic and atraumatic, with no gross asymmetry noted.  The facial nerve is intact, with strong symmetric movements.  Voice and Breathing - The patient was breathing comfortably without the use of accessory muscles. There was no wheezing, stridor, or stertor.  The patients voice was clear and " strong with frequent breaks throughout.   Ears -The external auditory canals are patent, the tympanic membranes are intact without effusion, retraction or mass.  Bony landmarks are intact.  Eyes - Extraocular movements intact, and the pupils were reactive to light.  Sclera were not icteric or injected, conjunctiva were pink and moist.  Mouth - Examination of the oral cavity showed pink, healthy oral mucosa. No lesions or ulcerations noted.  The tongue was mobile and midline, and the dentition were in good condition.    Throat - The walls of the oropharynx were smooth, pink, moist, symmetric, and had no lesions or ulcerations.  The tonsillar pillars and soft palate were symmetric.  The uvula was midline on elevation.    Neck - Normal midline excursion of the laryngotracheal complex during swallowing.  Full range of motion on passive movement.  Palpation of the occipital, submental, submandibular, internal jugular chain, and supraclavicular nodes did not demonstrate any abnormal lymph nodes or masses.  Palpation of the thyroid was soft and smooth, with no nodules or goiter appreciated.  The trachea was mobile and midline.  Nose - External contour is symmetric, no gross deflection or scars.  Nasal mucosa is pink and moist with no abnormal mucus.  The septum and turbinates were evaluated:   No polyps, masses, or purulence noted on examination.      Flexible Endoscopy -     Attempts at mirror laryngoscopy were not possible due to gag reflex.  Therefore I proceeded with a fiberoptic examination.  First I sprayed both sides of the nose with a mixture of lidocaine and neosynephrine.  I then passed the scope through the nasal cavity.  The nasal cavity was unremarkable.  The nasopharynx was mucosally covered and symmetric.  The Eustachian tube openings were unobstructed.  Going further down I had a clear view of the base of tongue which had normal appearing lingual tonsillar tissue.  The base of tongue was free of lesions,  and the vallecula was open.  The epiglottis was smooth and mucosally covered.  The supraglottic larynx was then clearly visualized.  The vocal cords moved smoothly and symmetrically, they were pearly white and no lesions were seen.  I did observe vocal cords during phonation and was not able to viz glottic gap. There was good symmetrical movement. No mass, lesion noted on exam. There was no visible tremor along cord. The pyriform sinuses were open, and the limited view of the postcricoid region did not show any lesions.        ASSESSMENT:    ICD-10-CM    1. Dysphonia R49.0    2. Vocal tremor R49.8        Complete speech therapy  Maintain good water intake  Limit caffeine.   Good mobility of vocal cords were mobile and symmetric. No mass.     Follow up after speech therapy  If symptoms worsen or do not improve- consider neurology consult. I am concerned for possible neurologic involvement. However I do not viz a significant tremor on exam.   However, discussed options of Voice clinic and/or video stroboscopy. Declines at this time.     Vocal hygiene reinforced.   She agrees with this plan.       Annabelle Mckeon PA-C  ENT  [facility], [place]  [phone]      Again, thank you for allowing me to participate in the care of your patient.        Sincerely,        Annabelle Mckeon PA-C

## 2018-10-25 NOTE — PROGRESS NOTES
Otolaryngology Consultation    Patient: Ena Lanza  : 1940    Patient presents with:  Throat Problem: Pt has been referred by Jus for articulation disorder and dysphonia.  Pt was ordered speech therapy.      HPI:  Ena Lanza is a 78 year old female seen today for concerns of dysphonia. She has felt that increase in symptoms over the last 6 months. She is speaking and feels that her words are getting cut off.   Ena has dry throat feeling. She does clear her throat, but this could be habitual.   She has noticed an increase in symptoms while on the phone. She went to  and had one session. She is scheduled for 10 appointments for .     No aphonia.     No chronic cough.   Denies throat pain.   Denies dysphagia.   Denies allergies. Denies post nasal drip.   Denies sinusitis. Denies cervical or neck pain. No decrease in ROM.   Denies facial numbness or weakness.       Drinks plenty of water. 3-4 cups coffee daily.   Never tobacco user.   ETOH- None.   Denies reflux.     Current Outpatient Rx   Medication Sig Dispense Refill     aspirin 81 MG chewable tablet Take 1 tablet (81 mg) by mouth daily 108 tablet 3     blood glucose monitoring (ACCU-CHEK FASTCLIX) lancets Use to test blood sugar 2 times daily. 102 Box 5     blood glucose monitoring (ACCU-CHEK SMARTVIEW) test strip Use to test blood sugar 2 times daily or as directed 100 each 5     ibuprofen (ADVIL/MOTRIN) 800 MG tablet Take 1 tablet (800 mg) by mouth every 8 hours as needed for moderate pain 90 tablet 1     lisinopril (PRINIVIL/ZESTRIL) 20 MG tablet Take 1.5 tablets (30 mg) by mouth daily 45 tablet 11     metFORMIN (GLUCOPHAGE-XR) 500 MG 24 hr tablet TAKE 2 TABLETS BY MOUTH TWICE DAILY WITH MEALS 360 tablet 3     STATIN NOT PRESCRIBED, INTENTIONAL, Statin not prescribed intentionally due to Intolerance (with supporting documentation of trying a statin at least once within the last 5 years) 0 each 0       Allergies: Review of patient's  "allergies indicates no known allergies.     Past Medical History:   Diagnosis Date     Anxiety     no treatment     Essential hypertension 10/5/2015     Essential hypertension with goal blood pressure less than 140/90 6/13/2016     Herpes zoster with complication 6/13/2016     HTN (hypertension) 10/29/2014     Hyperlipidemia LDL goal < 100 10/29/2014     Hyperlipidemia LDL goal < 100 10/29/2014     Lesion of sciatic nerve 8/27/2014     Newly recognized heart murmur 6/13/2016     Type 2 diabetes mellitus with hyperglycemia, without long-term current use of insulin (H) 11/28/2016     Type 2 diabetes mellitus without complication (H) 10/5/2015       History reviewed. No pertinent surgical history.    ENT family history reviewed    Social History   Substance Use Topics     Smoking status: Never Smoker     Smokeless tobacco: Never Used      Comment: yes passive exposure     Alcohol use No       Review of Systems  ROS: 10 point ROS neg other than the symptoms noted above in the HPI     Physical Exam  /80 (BP Location: Left arm, Cuff Size: Adult Large)  Pulse 85  Temp 97.8  F (36.6  C) (Tympanic)  Ht 1.626 m (5' 4\")  Wt 70.3 kg (155 lb)  BMI 26.61 kg/m2  General - The patient is well nourished and well developed, and appears to have good nutritional status.  Alert and oriented to person and place, answers questions and cooperates with examination appropriately.   Head and Face - Normocephalic and atraumatic, with no gross asymmetry noted.  The facial nerve is intact, with strong symmetric movements.  Voice and Breathing - The patient was breathing comfortably without the use of accessory muscles. There was no wheezing, stridor, or stertor.  The patients voice was clear and strong with frequent breaks throughout.   Ears -The external auditory canals are patent, the tympanic membranes are intact without effusion, retraction or mass.  Bony landmarks are intact.  Eyes - Extraocular movements intact, and the pupils " were reactive to light.  Sclera were not icteric or injected, conjunctiva were pink and moist.  Mouth - Examination of the oral cavity showed pink, healthy oral mucosa. No lesions or ulcerations noted.  The tongue was mobile and midline, and the dentition were in good condition.    Throat - The walls of the oropharynx were smooth, pink, moist, symmetric, and had no lesions or ulcerations.  The tonsillar pillars and soft palate were symmetric.  The uvula was midline on elevation.    Neck - Normal midline excursion of the laryngotracheal complex during swallowing.  Full range of motion on passive movement.  Palpation of the occipital, submental, submandibular, internal jugular chain, and supraclavicular nodes did not demonstrate any abnormal lymph nodes or masses.  Palpation of the thyroid was soft and smooth, with no nodules or goiter appreciated.  The trachea was mobile and midline.  Nose - External contour is symmetric, no gross deflection or scars.  Nasal mucosa is pink and moist with no abnormal mucus.  The septum and turbinates were evaluated:   No polyps, masses, or purulence noted on examination.      Flexible Endoscopy -     Attempts at mirror laryngoscopy were not possible due to gag reflex.  Therefore I proceeded with a fiberoptic examination.  First I sprayed both sides of the nose with a mixture of lidocaine and neosynephrine.  I then passed the scope through the nasal cavity.  The nasal cavity was unremarkable.  The nasopharynx was mucosally covered and symmetric.  The Eustachian tube openings were unobstructed.  Going further down I had a clear view of the base of tongue which had normal appearing lingual tonsillar tissue.  The base of tongue was free of lesions, and the vallecula was open.  The epiglottis was smooth and mucosally covered.  The supraglottic larynx was then clearly visualized.  The vocal cords moved smoothly and symmetrically, they were pearly white and no lesions were seen.  I did  observe vocal cords during phonation and was not able to viz glottic gap. There was good symmetrical movement. No mass, lesion noted on exam. There was no visible tremor along cord. The pyriform sinuses were open, and the limited view of the postcricoid region did not show any lesions.        ASSESSMENT:    ICD-10-CM    1. Dysphonia R49.0    2. Vocal tremor R49.8        Complete speech therapy  Maintain good water intake  Limit caffeine.   Good mobility of vocal cords were mobile and symmetric. No mass.     Follow up after speech therapy  If symptoms worsen or do not improve- consider neurology consult. I am concerned for possible neurologic involvement. However I do not viz a significant tremor on exam.   However, discussed options of Voice clinic and/or video stroboscopy. Declines at this time.     Vocal hygiene reinforced.   She agrees with this plan.       Annabelle Mckeon PA-C  ENT  [facility], [place]  [phone]

## 2018-10-26 ENCOUNTER — TRANSFERRED RECORDS (OUTPATIENT)
Dept: HEALTH INFORMATION MANAGEMENT | Facility: CLINIC | Age: 78
End: 2018-10-26

## 2018-10-30 ENCOUNTER — HOSPITAL ENCOUNTER (OUTPATIENT)
Dept: SPEECH THERAPY | Facility: HOSPITAL | Age: 78
Setting detail: THERAPIES SERIES
End: 2018-10-30
Attending: PHYSICIAN ASSISTANT
Payer: MEDICARE

## 2018-10-30 PROCEDURE — 40000211 ZZHC STATISTIC SLP  DEPARTMENT VISIT

## 2018-10-30 PROCEDURE — 92507 TX SP LANG VOICE COMM INDIV: CPT | Mod: GN

## 2018-11-01 NOTE — PROGRESS NOTES
Contacted patient via phone. Due to her last exam and overall normal laryngeal exam- I recommended completing MRI Brain. Patient declines at this time.   If she has no improvement- this would be again recommended. She declines at this time and wants to try EFREN Zuniga

## 2018-11-06 ENCOUNTER — HOSPITAL ENCOUNTER (OUTPATIENT)
Dept: SPEECH THERAPY | Facility: HOSPITAL | Age: 78
Setting detail: THERAPIES SERIES
End: 2018-11-06
Attending: PHYSICIAN ASSISTANT
Payer: MEDICARE

## 2018-11-06 PROCEDURE — 40000211 ZZHC STATISTIC SLP  DEPARTMENT VISIT

## 2018-11-06 PROCEDURE — 92507 TX SP LANG VOICE COMM INDIV: CPT | Mod: GN

## 2018-11-13 ENCOUNTER — TELEPHONE (OUTPATIENT)
Dept: FAMILY MEDICINE | Facility: OTHER | Age: 78
End: 2018-11-13

## 2018-11-13 DIAGNOSIS — E11.9 CONTROLLED TYPE 2 DIABETES MELLITUS WITHOUT COMPLICATION, WITHOUT LONG-TERM CURRENT USE OF INSULIN (H): Primary | ICD-10-CM

## 2018-11-13 NOTE — TELEPHONE ENCOUNTER
Patient called and stated accu-chek meter has broken and is requesting a new meter device. Please advise. medication pended.  Yari Barber, CMA

## 2018-11-26 DIAGNOSIS — I10 ESSENTIAL HYPERTENSION WITH GOAL BLOOD PRESSURE LESS THAN 140/90: ICD-10-CM

## 2018-11-26 NOTE — TELEPHONE ENCOUNTER
Lisinopril  Last Written Prescription Date: 9/20/18  Last Fill Quantity: 45 # of Refills: 11  Last Office Visit: 9/20/18

## 2018-11-28 RX ORDER — LISINOPRIL 20 MG/1
30 TABLET ORAL DAILY
Qty: 45 TABLET | Refills: 11 | OUTPATIENT
Start: 2018-11-28

## 2018-12-03 NOTE — PROGRESS NOTES
Outpatient Speech Language Pathology Discharge Note     Patient: Ena Lanza  : 1940    Beginning/End Dates of Reporting Period:  10/2/2018 to 12/3/2018    Referring Provider: EFREN Mccloud    Therapy Diagnosis: Vocal Tremor    Client Self Report: Patient was seen for an evaluation on 10/2/18 and 2 therapy sessions.  During the last therapy session on 18 the patient reported that her friend commented on improved voicing on telephone and similar observations were made with SLP in which patient eliminated vocal arrests through compensations.  Annabelle Mckeon contacted pt to discuss again neurology referral and pt rejected.  SLP discussed how neurology could help due to unvoluntary movements resulting in voice tremor.  Pt increased interest but rejects referral.      Objective Measurements:      Objective Measure: Breath Support  Details: 70% accurate independently; 90% accurate with gestural cueing.  Both at conversational level  Objective Measure: Easy Onset  Details: 85% accurate at conversational level independently  Objective Measure: Adduction   Details: not addressed        Goals:  Goal Identifier LTG   Goal Description Patient will demonstrate a nondisordered vocal quality to untrained ear with independent use of compensations to assist voicing.   Target Date 18   Date Met      Progress:     Goal Identifier STG 1   Goal Description Patient will demonstrate 80% success within semistructured tasks with breath-to-sound flow and adduction tasks at the sentence level following minimal support   Target Date 18   Date Met      Progress:     Goal Identifier STG 2   Goal Description Patient will demonstrate 90% success with appropriate respiratory support for talking within semi-structured sentence length tasks with minimal support   Target Date 18   Date Met      Progress:     Progress Toward Goals:   Progress this reporting period: Patient was seen for 2/20 planned therapy sessions.   Patient increased vocal quality by eliminating vocal arrests through use of compensations at both the structured and conversational level.  Patient continues to demonstrate vocal tremor and clinician educated patient regarding use of compensations to increase voice quality vs. referral to neurology to address underlying uncontrollable movements possibly impacting laryngeal functioning.  Patient increased acceptance of neurology involvement but rejected referral at this time.  She wishes to discontinue SLP services at this time due to long travel from [place] to clinic during winter road conditions.         Plan:  Discharge from therapy.    Discharge:    Reason for Discharge: Patient chooses to discontinue therapy.  Patient has not made expected progress due to interrupted treatment attendance.  Medicare G-code: Patient did not attend a final scheduled session prior to discharge. Unable to determine discharge functional status.    Equipment Issued: none    Discharge Plan: reinitiate services and accept referral to neurologist.

## 2018-12-11 DIAGNOSIS — I10 ESSENTIAL HYPERTENSION WITH GOAL BLOOD PRESSURE LESS THAN 140/90: ICD-10-CM

## 2018-12-11 NOTE — TELEPHONE ENCOUNTER
[facility] called as patient was standing in line asking why her Lisinopril wasn't filled.  [facility] stated they have asked numerous times for the refill but have gotten no response from us.  The med is now T'd up, can you please sign it.  Thank you so much.

## 2018-12-12 RX ORDER — LISINOPRIL 20 MG/1
30 TABLET ORAL DAILY
Qty: 45 TABLET | Refills: 11 | Status: SHIPPED | OUTPATIENT
Start: 2018-12-12 | End: 2018-12-12

## 2018-12-12 NOTE — TELEPHONE ENCOUNTER
10:10 AM    Reason for Call: Phone Call    Description: [facility] called and they are wondering if provider will approve 90 tabelts instead of 45 tablets for the patient. It is approved they just need verbal orders to run it from the provider. It would be more convenient  for the patient. If approved please send new rx to [facility] for quantity of 90 tablets of lisinopril     Was an appointment offered for this call? No  If yes : Appointment type              Date    Preferred method for responding to this message: Telephone Call  What is your phone number ?    If we cannot reach you directly, may we leave a detailed response at the number you provided? No    Can this message wait until your PCP/provider returns, if available today? Not applicable    Yari Barber MA

## 2018-12-13 RX ORDER — LISINOPRIL 20 MG/1
30 TABLET ORAL DAILY
Qty: 90 TABLET | Refills: 11 | Status: SHIPPED | OUTPATIENT
Start: 2018-12-13 | End: 2020-01-22

## 2019-01-25 ENCOUNTER — TELEPHONE (OUTPATIENT)
Dept: FAMILY MEDICINE | Facility: OTHER | Age: 79
End: 2019-01-25

## 2019-01-25 DIAGNOSIS — L30.9 HAND DERMATITIS: Primary | ICD-10-CM

## 2019-01-25 NOTE — TELEPHONE ENCOUNTER
Reason for call:  REFERRAL   1. Concern: hand dermatitis  2. Have you seen a provider for this concern? Yes  3. Who? Amanda Luu  4. When? May 2018  5. What services are you requesting? Referral to dermatologist  Description: Pt was scheduled with Dr. Eden for Sept 2018, but had to cancel due to family emergency. She rescheduled, but soonest available he has now is June 18, 2019. She would like to know if Amanda can refer her to anyone else that she might be able to see sooner.  Was an appointment offered for this a call? Yes  If Yes:  Appointment type                Date 06/18/19    Preferred method for responding to this message: Telephone Call  What is your phone number ? [phone]    If we cannot reach you directly, may we leave a detailed response at the number you provided? Yes  Can this message wait until your PCP/Provider returns if not available today? Not applicable, provider is in today

## 2019-06-07 NOTE — PROGRESS NOTES
Subjective     Ena Lanza is a 78 year old female who presents to clinic today for the following health issues:    HPI   Diabetes Follow-up      How often are you checking your blood sugar? One time daily    What time of day are you checking your blood sugars (select all that apply)?  Before meals    Have you had any blood sugars above 200?  No    Have you had any blood sugars below 70?  No    What symptoms do you notice when your blood sugar is low?  None    What concerns do you have today about your diabetes? None     Do you have any of these symptoms? (Select all that apply)  No numbness or tingling in feet.  No redness, sores or blisters on feet.  No complaints of excessive thirst.  No reports of blurry vision.  No significant changes to weight.     Have you had a diabetic eye exam in the last 12 months? Yes- Date of last eye exam: 10/26/2018    Diabetes Management Resources    Hyperlipidemia Follow-Up      Are you having any of the following symptoms? (Select all that apply)  No complaints of shortness of breath, chest pain or pressure.  No increased sweating or nausea with activity.  No left-sided neck or arm pain.  No complaints of pain in calves when walking 1-2 blocks.    Are you regularly taking any medication or supplement to lower your cholesterol?   No    Are you having muscle aches or other side effects that you think could be caused by your cholesterol lowering medication?  No    Hypertension Follow-up      Do you check your blood pressure regularly outside of the clinic? No     Are you following a low salt diet? No    Are your blood pressures ever more than 140 on the top number (systolic) OR more   than 90 on the bottom number (diastolic), for example 140/90? No    BP Readings from Last 2 Encounters:   06/13/19 146/62   10/25/18 140/80     Hemoglobin A1C (%)   Date Value   06/11/2019 6.9 (H)   09/18/2018 6.5 (H)     LDL Cholesterol Calculated (mg/dL)   Date Value   06/11/2019 153 (H)    09/18/2018 138 (H)       Amount of exercise or physical activity: 6-7 days/week for an average of 30-45 minutes    Problems taking medications regularly: No    Medication side effects: none    Diet: regular (no restrictions)          Patient Active Problem List   Diagnosis     Lesion of sciatic nerve     Hyperlipidemia with target LDL less than 100     HTN (hypertension)     DDD (degenerative disc disease), lumbar     Essential hypertension     Type 2 diabetes mellitus without complication (H)     ACP (advance care planning)     Newly recognized heart murmur     Essential hypertension with goal blood pressure less than 140/90     Herpes zoster with complication     Type 2 diabetes mellitus with hyperglycemia, without long-term current use of insulin (H)     Heart murmur     Aortic valve sclerosis     History reviewed. No pertinent surgical history.    Social History     Tobacco Use     Smoking status: Never Smoker     Smokeless tobacco: Never Used     Tobacco comment: yes passive exposure   Substance Use Topics     Alcohol use: No     Alcohol/week: 0.0 oz     Family History   Problem Relation Age of Onset     C.A.D. Father 71        cause of death     Cancer Father         melanoma     Coronary Artery Disease Father      Thyroid Disease Mother      Hypertension Mother      Thyroid Disease Sister      Other Cancer Sister         melanoma     Diabetes No family hx of      Hyperlipidemia No family hx of      Asthma No family hx of      Breast Cancer No family hx of      Colon Cancer No family hx of          Current Outpatient Medications   Medication Sig Dispense Refill     aspirin 81 MG chewable tablet Take 1 tablet (81 mg) by mouth daily 108 tablet 3     blood glucose monitoring (ACCU-CHEK FASTCLIX) lancets Use to test blood sugar 2 times daily. 102 Box 5     blood glucose monitoring (ACCU-CHEK CINTHIA SMARTVIEW) meter device kit Use to test blood sugar 2 times daily or as directed. 1 kit 0     blood glucose  monitoring (ACCU-CHEK SMARTVIEW) test strip Use to test blood sugar 2 times daily or as directed 100 each 5     ibuprofen (ADVIL/MOTRIN) 800 MG tablet Take 1 tablet (800 mg) by mouth every 8 hours as needed for moderate pain 90 tablet 1     lisinopril (PRINIVIL/ZESTRIL) 20 MG tablet Take 1.5 tablets (30 mg) by mouth daily 90 tablet 11     metFORMIN (GLUCOPHAGE-XR) 500 MG 24 hr tablet TAKE 2 TABLETS BY MOUTH TWICE DAILY WITH MEALS 360 tablet 3     STATIN NOT PRESCRIBED, INTENTIONAL, Statin not prescribed intentionally due to Intolerance (with supporting documentation of trying a statin at least once within the last 5 years) 0 each 0     No Known Allergies  Recent Labs   Lab Test 06/11/19  1025 09/18/18  0922 08/21/17  0844  01/19/17  0916 06/13/16  1050 10/21/15  1008   A1C 6.9* 6.5* 5.8  --  6.8* 6.7* 6.4*   * 138*  --   --  107* 110* 143*   HDL 63 75  --   --  69 67 73   TRIG 170* 201*  --   --  168* 202* 199*   ALT 20  --   --   --   --  18 27   CR 0.78 0.79  --    < > 0.74 0.70 0.65   GFRESTIMATED 73 70  --    < > 77 81 89   GFRESTBLACK 84 85  --    < > >90   GFR Calc   >90   GFR Calc   >90   POTASSIUM 4.5 4.4  --    < > 3.5 3.6 3.9   TSH 1.14  --   --   --   --  1.03  --     < > = values in this interval not displayed.      BP Readings from Last 3 Encounters:   06/13/19 146/62   10/25/18 140/80   09/20/18 134/72    Wt Readings from Last 3 Encounters:   06/13/19 73.9 kg (163 lb)   10/25/18 70.3 kg (155 lb)   09/20/18 74.4 kg (164 lb)                      Reviewed and updated as needed this visit by Provider         Review of Systems   ROS COMP: Constitutional, HEENT, cardiovascular, pulmonary, GI, , musculoskeletal, neuro, skin, endocrine and psych systems are negative, except as otherwise noted.      Objective    /62 (BP Location: Right arm, Patient Position: Sitting, Cuff Size: Adult Regular)   Pulse 84   Temp 98.4  F (36.9  C) (Tympanic)   Resp 20   Ht 1.594 m  "(5' 2.75\")   Wt 73.9 kg (163 lb)   SpO2 98%   BMI 29.10 kg/m    Body mass index is 29.1 kg/m .  Physical Exam   GENERAL: healthy, alert and no distress  EYES: Eyes grossly normal to inspection, PERRL and conjunctivae and sclerae normal  HENT: ear canals and TM's normal, nose and mouth without ulcers or lesions  NECK: no adenopathy, no asymmetry, masses, or scars and thyroid normal to palpation  RESP: lungs clear to auscultation - no rales, rhonchi or wheezes  BREAST: normal without masses, tenderness or nipple discharge and no palpable axillary masses or adenopathy  CV: regular rate and rhythm, normal S1 S2, no S3 or S4, no murmur, click or rub, no peripheral edema and peripheral pulses strong  ABDOMEN: soft, nontender, no hepatosplenomegaly, no masses and bowel sounds normal  MS: no gross musculoskeletal defects noted, no edema  SKIN: no suspicious lesions or rashes  NEURO: Normal strength and tone, mentation intact and speech normal  PSYCH: mentation appears normal, affect normal/bright  LYMPH: no cervical, supraclavicular, axillary, or inguinal adenopathy    Diagnostic Test Results:  Labs reviewed in Epic  Results for orders placed or performed in visit on 06/13/19   Hemoglobin A1c   Result Value Ref Range    Hemoglobin A1C 6.9 (H) 0 - 5.6 %   TSH   Result Value Ref Range    TSH 1.14 0.40 - 4.00 mU/L   Comprehensive metabolic panel   Result Value Ref Range    Sodium 139 133 - 144 mmol/L    Potassium 4.5 3.4 - 5.3 mmol/L    Chloride 108 94 - 109 mmol/L    Carbon Dioxide 24 20 - 32 mmol/L    Anion Gap 7 3 - 14 mmol/L    Glucose 142 (H) 70 - 99 mg/dL    Urea Nitrogen 19 7 - 30 mg/dL    Creatinine 0.78 0.52 - 1.04 mg/dL    GFR Estimate 73 >60 mL/min/[1.73_m2]    GFR Estimate If Black 84 >60 mL/min/[1.73_m2]    Calcium 9.6 8.5 - 10.1 mg/dL    Bilirubin Total 0.6 0.2 - 1.3 mg/dL    Albumin 4.0 3.4 - 5.0 g/dL    Protein Total 7.9 6.8 - 8.8 g/dL    Alkaline Phosphatase 103 40 - 150 U/L    ALT 20 0 - 50 U/L    AST 11 0 " "- 45 U/L   Lipid Profile   Result Value Ref Range    Cholesterol 250 (H) <200 mg/dL    Triglycerides 170 (H) <150 mg/dL    HDL Cholesterol 63 >49 mg/dL    LDL Cholesterol Calculated 153 (H) <100 mg/dL    Non HDL Cholesterol 187 (H) <130 mg/dL   Estimated Average Glucose   Result Value Ref Range    Estimated Average Glucose 151 mg/dL           Assessment & Plan     1. Hyperlipidemia with target LDL less than 100  She is trying to watch her diet. Knowing what is good and not. She says at 78 she will do her best but life is good. Eating some things are just fine to her on occasion. I di have to agree with her. She Is compliant on her medications and very good at watching her weight. Exercise Is still corner stone for her. But moving less and less.   - Lipid Profile  - Estimated Average Glucose  - Estimated Average Glucose    2. Type 2 diabetes mellitus without complication, without long-term current use of insulin (H)  She is going to be getting labs. Compliant with her eye exam foot exam and ASA.   - Hemoglobin A1c  - TSH  - Comprehensive metabolic panel    3. Ankle swelling, unspecified laterality  She was to have CXR related to legs swelling. Found only some old lung scarring encourage elevation and avoiding salt. Offered ADRIANNA feels she has them at home.   - XR CHEST 2 VW (Clinic Performed); Future     BMI:   Estimated body mass index is 29.1 kg/m  as calculated from the following:    Height as of this encounter: 1.594 m (5' 2.75\").    Weight as of this encounter: 73.9 kg (163 lb).           See Patient Instructions    No follow-ups on file.    EFREN Troy  [facility] [place]        "

## 2019-06-11 DIAGNOSIS — E11.9 TYPE 2 DIABETES MELLITUS WITHOUT COMPLICATION, WITHOUT LONG-TERM CURRENT USE OF INSULIN (H): Primary | ICD-10-CM

## 2019-06-11 LAB
ALBUMIN SERPL-MCNC: 4 G/DL (ref 3.4–5)
ALBUMIN UR-MCNC: NEGATIVE MG/DL
ALP SERPL-CCNC: 103 U/L (ref 40–150)
ALT SERPL W P-5'-P-CCNC: 20 U/L (ref 0–50)
ANION GAP SERPL CALCULATED.3IONS-SCNC: 7 MMOL/L (ref 3–14)
APPEARANCE UR: ABNORMAL
AST SERPL W P-5'-P-CCNC: 11 U/L (ref 0–45)
BACTERIA #/AREA URNS HPF: ABNORMAL /HPF
BILIRUB SERPL-MCNC: 0.6 MG/DL (ref 0.2–1.3)
BILIRUB UR QL STRIP: NEGATIVE
BUN SERPL-MCNC: 19 MG/DL (ref 7–30)
CALCIUM SERPL-MCNC: 9.6 MG/DL (ref 8.5–10.1)
CHLORIDE SERPL-SCNC: 108 MMOL/L (ref 94–109)
CHOLEST SERPL-MCNC: 250 MG/DL
CO2 SERPL-SCNC: 24 MMOL/L (ref 20–32)
COLOR UR AUTO: ABNORMAL
CREAT SERPL-MCNC: 0.78 MG/DL (ref 0.52–1.04)
CREAT UR-MCNC: 37 MG/DL
EST. AVERAGE GLUCOSE BLD GHB EST-MCNC: 151 MG/DL
GFR SERPL CREATININE-BSD FRML MDRD: 73 ML/MIN/{1.73_M2}
GLUCOSE SERPL-MCNC: 142 MG/DL (ref 70–99)
GLUCOSE UR STRIP-MCNC: NEGATIVE MG/DL
HBA1C MFR BLD: 6.9 % (ref 0–5.6)
HDLC SERPL-MCNC: 63 MG/DL
HGB UR QL STRIP: NEGATIVE
KETONES UR STRIP-MCNC: NEGATIVE MG/DL
LDLC SERPL CALC-MCNC: 153 MG/DL
LEUKOCYTE ESTERASE UR QL STRIP: ABNORMAL
MICROALBUMIN UR-MCNC: 71 MG/L
MICROALBUMIN/CREAT UR: 192.9 MG/G CR (ref 0–25)
MUCOUS THREADS #/AREA URNS LPF: PRESENT /LPF
NITRATE UR QL: NEGATIVE
NONHDLC SERPL-MCNC: 187 MG/DL
PH UR STRIP: 5 PH (ref 4.7–8)
POTASSIUM SERPL-SCNC: 4.5 MMOL/L (ref 3.4–5.3)
PROT SERPL-MCNC: 7.9 G/DL (ref 6.8–8.8)
RBC #/AREA URNS AUTO: 1 /HPF (ref 0–2)
SODIUM SERPL-SCNC: 139 MMOL/L (ref 133–144)
SOURCE: ABNORMAL
SP GR UR STRIP: 1.01 (ref 1–1.03)
SQUAMOUS #/AREA URNS AUTO: 9 /HPF (ref 0–1)
TRIGL SERPL-MCNC: 170 MG/DL
TSH SERPL DL<=0.005 MIU/L-ACNC: 1.14 MU/L (ref 0.4–4)
UROBILINOGEN UR STRIP-MCNC: NORMAL MG/DL (ref 0–2)
WBC #/AREA URNS AUTO: 6 /HPF (ref 0–5)

## 2019-06-11 PROCEDURE — 36415 COLL VENOUS BLD VENIPUNCTURE: CPT | Mod: ZL | Performed by: PHYSICIAN ASSISTANT

## 2019-06-11 PROCEDURE — 83036 HEMOGLOBIN GLYCOSYLATED A1C: CPT | Mod: ZL | Performed by: PHYSICIAN ASSISTANT

## 2019-06-11 PROCEDURE — 40000788 ZZHCL STATISTIC ESTIMATED AVERAGE GLUCOSE: Mod: ZL | Performed by: PHYSICIAN ASSISTANT

## 2019-06-11 PROCEDURE — 84443 ASSAY THYROID STIM HORMONE: CPT | Mod: ZL | Performed by: PHYSICIAN ASSISTANT

## 2019-06-11 PROCEDURE — 80053 COMPREHEN METABOLIC PANEL: CPT | Mod: ZL | Performed by: PHYSICIAN ASSISTANT

## 2019-06-11 PROCEDURE — 82043 UR ALBUMIN QUANTITATIVE: CPT | Mod: ZL | Performed by: PHYSICIAN ASSISTANT

## 2019-06-11 PROCEDURE — 81001 URINALYSIS AUTO W/SCOPE: CPT | Mod: ZL | Performed by: PHYSICIAN ASSISTANT

## 2019-06-11 PROCEDURE — 80061 LIPID PANEL: CPT | Mod: ZL | Performed by: PHYSICIAN ASSISTANT

## 2019-06-13 ENCOUNTER — ANCILLARY PROCEDURE (OUTPATIENT)
Dept: GENERAL RADIOLOGY | Facility: OTHER | Age: 79
End: 2019-06-13
Attending: PHYSICIAN ASSISTANT
Payer: MEDICARE

## 2019-06-13 ENCOUNTER — OFFICE VISIT (OUTPATIENT)
Dept: FAMILY MEDICINE | Facility: OTHER | Age: 79
End: 2019-06-13
Attending: PHYSICIAN ASSISTANT
Payer: COMMERCIAL

## 2019-06-13 VITALS
WEIGHT: 163 LBS | TEMPERATURE: 98.4 F | HEART RATE: 84 BPM | SYSTOLIC BLOOD PRESSURE: 146 MMHG | RESPIRATION RATE: 20 BRPM | OXYGEN SATURATION: 98 % | DIASTOLIC BLOOD PRESSURE: 62 MMHG | HEIGHT: 63 IN | BODY MASS INDEX: 28.88 KG/M2

## 2019-06-13 DIAGNOSIS — M25.473 ANKLE SWELLING, UNSPECIFIED LATERALITY: ICD-10-CM

## 2019-06-13 DIAGNOSIS — E11.9 TYPE 2 DIABETES MELLITUS WITHOUT COMPLICATION, WITHOUT LONG-TERM CURRENT USE OF INSULIN (H): ICD-10-CM

## 2019-06-13 DIAGNOSIS — E78.5 HYPERLIPIDEMIA WITH TARGET LDL LESS THAN 100: Primary | ICD-10-CM

## 2019-06-13 PROCEDURE — G0463 HOSPITAL OUTPT CLINIC VISIT: HCPCS

## 2019-06-13 PROCEDURE — 71046 X-RAY EXAM CHEST 2 VIEWS: CPT | Mod: TC

## 2019-06-13 PROCEDURE — 99214 OFFICE O/P EST MOD 30 MIN: CPT | Performed by: PHYSICIAN ASSISTANT

## 2019-06-13 PROCEDURE — G0463 HOSPITAL OUTPT CLINIC VISIT: HCPCS | Mod: 25

## 2019-06-13 ASSESSMENT — ANXIETY QUESTIONNAIRES
GAD7 TOTAL SCORE: 2
4. TROUBLE RELAXING: SEVERAL DAYS
3. WORRYING TOO MUCH ABOUT DIFFERENT THINGS: NOT AT ALL
2. NOT BEING ABLE TO STOP OR CONTROL WORRYING: NOT AT ALL
1. FEELING NERVOUS, ANXIOUS, OR ON EDGE: SEVERAL DAYS
5. BEING SO RESTLESS THAT IT IS HARD TO SIT STILL: NOT AT ALL
7. FEELING AFRAID AS IF SOMETHING AWFUL MIGHT HAPPEN: NOT AT ALL
6. BECOMING EASILY ANNOYED OR IRRITABLE: NOT AT ALL

## 2019-06-13 ASSESSMENT — MIFFLIN-ST. JEOR: SCORE: 1189.52

## 2019-06-13 ASSESSMENT — PATIENT HEALTH QUESTIONNAIRE - PHQ9: SUM OF ALL RESPONSES TO PHQ QUESTIONS 1-9: 2

## 2019-06-13 ASSESSMENT — PAIN SCALES - GENERAL: PAINLEVEL: NO PAIN (0)

## 2019-06-13 NOTE — LETTER
June 11, 2019      Ena Lanza  [address] [place] [address]        Dear ,    We are writing to inform you of your test results.    Your test results fall within the expected range(s) or remain unchanged from previous results.  Please continue with current treatment plan.    Resulted Orders   Hemoglobin A1c   Result Value Ref Range    Hemoglobin A1C 6.9 (H) 0 - 5.6 %      Comment:      Normal <5.7% Prediabetes 5.7-6.4%  Diabetes 6.5% or higher - adopted from ADA   consensus guidelines.     TSH   Result Value Ref Range    TSH 1.14 0.40 - 4.00 mU/L   Comprehensive metabolic panel   Result Value Ref Range    Sodium 139 133 - 144 mmol/L    Potassium 4.5 3.4 - 5.3 mmol/L    Chloride 108 94 - 109 mmol/L    Carbon Dioxide 24 20 - 32 mmol/L    Anion Gap 7 3 - 14 mmol/L    Glucose 142 (H) 70 - 99 mg/dL      Comment:      Fasting specimen    Urea Nitrogen 19 7 - 30 mg/dL    Creatinine 0.78 0.52 - 1.04 mg/dL    GFR Estimate 73 >60 mL/min/[1.73_m2]      Comment:      Non  GFR Calc  Starting 12/18/2018, serum creatinine based estimated GFR (eGFR) will be   calculated using the Chronic Kidney Disease Epidemiology Collaboration   (CKD-EPI) equation.      GFR Estimate If Black 84 >60 mL/min/[1.73_m2]      Comment:       GFR Calc  Starting 12/18/2018, serum creatinine based estimated GFR (eGFR) will be   calculated using the Chronic Kidney Disease Epidemiology Collaboration   (CKD-EPI) equation.      Calcium 9.6 8.5 - 10.1 mg/dL    Bilirubin Total 0.6 0.2 - 1.3 mg/dL    Albumin 4.0 3.4 - 5.0 g/dL    Protein Total 7.9 6.8 - 8.8 g/dL    Alkaline Phosphatase 103 40 - 150 U/L    ALT 20 0 - 50 U/L    AST 11 0 - 45 U/L   Lipid Profile   Result Value Ref Range    Cholesterol 250 (H) <200 mg/dL      Comment:      Desirable:       <200 mg/dl    Triglycerides 170 (H) <150 mg/dL      Comment:      Borderline high:  150-199 mg/dl  High:             200-499 mg/dl  Very high:       >499 mg/dl  Fasting  specimen      HDL Cholesterol 63 >49 mg/dL    LDL Cholesterol Calculated 153 (H) <100 mg/dL      Comment:      Above desirable:  100-129 mg/dl  Borderline High:  130-159 mg/dL  High:             160-189 mg/dL  Very high:       >189 mg/dl      Non HDL Cholesterol 187 (H) <130 mg/dL      Comment:      Above Desirable:  130-159 mg/dl  Borderline high:  160-189 mg/dl  High:             190-219 mg/dl  Very high:       >219 mg/dl     Estimated Average Glucose   Result Value Ref Range    Estimated Average Glucose 151 mg/dL       If you have any questions or concerns, please call the clinic at the number listed above.       Sincerely,        Amanda Luu PA

## 2019-06-13 NOTE — PATIENT INSTRUCTIONS
Orders Only on 06/11/2019   Component Date Value Ref Range Status     Creatinine Urine 06/11/2019 37  mg/dL Final     Albumin Urine mg/L 06/11/2019 71  mg/L Final     Albumin Urine mg/g Cr 06/11/2019 192.90* 0 - 25 mg/g Cr Final     Color Urine 06/11/2019 Light Yellow   Final     Appearance Urine 06/11/2019 Slightly Cloudy   Final     Glucose Urine 06/11/2019 Negative  NEG^Negative mg/dL Final     Bilirubin Urine 06/11/2019 Negative  NEG^Negative Final     Ketones Urine 06/11/2019 Negative  NEG^Negative mg/dL Final     Specific Gravity Urine 06/11/2019 1.011  1.003 - 1.035 Final     Blood Urine 06/11/2019 Negative  NEG^Negative Final     pH Urine 06/11/2019 5.0  4.7 - 8.0 pH Final     Protein Albumin Urine 06/11/2019 Negative  NEG^Negative mg/dL Final     Urobilinogen mg/dL 06/11/2019 Normal  0.0 - 2.0 mg/dL Final     Nitrite Urine 06/11/2019 Negative  NEG^Negative Final     Leukocyte Esterase Urine 06/11/2019 Trace* NEG^Negative Final     Source 06/11/2019 Midstream Urine   Final     RBC Urine 06/11/2019 1  0 - 2 /HPF Final     WBC Urine 06/11/2019 6* 0 - 5 /HPF Final     Bacteria Urine 06/11/2019 Many* NEG^Negative /HPF Final     Squamous Epithelial /HPF Urine 06/11/2019 9* 0 - 1 /HPF Final     Mucous Urine 06/11/2019 Present* NEG^Negative /LPF Final

## 2019-06-13 NOTE — NURSING NOTE
"Chief Complaint   Patient presents with     Hypertension     Diabetes     Lipids       Initial /62 (BP Location: Right arm, Patient Position: Sitting, Cuff Size: Adult Regular)   Pulse 84   Temp 98.4  F (36.9  C) (Tympanic)   Resp 20   Ht 1.594 m (5' 2.75\")   Wt 73.9 kg (163 lb)   SpO2 98%   BMI 29.10 kg/m   Estimated body mass index is 29.1 kg/m  as calculated from the following:    Height as of this encounter: 1.594 m (5' 2.75\").    Weight as of this encounter: 73.9 kg (163 lb).  Medication Reconciliation: complete      "

## 2019-06-14 ASSESSMENT — ANXIETY QUESTIONNAIRES: GAD7 TOTAL SCORE: 2

## 2019-06-18 ENCOUNTER — OFFICE VISIT (OUTPATIENT)
Dept: DERMATOLOGY | Facility: OTHER | Age: 79
End: 2019-06-18
Attending: DERMATOLOGY
Payer: COMMERCIAL

## 2019-06-18 VITALS
BODY MASS INDEX: 27.83 KG/M2 | HEART RATE: 81 BPM | SYSTOLIC BLOOD PRESSURE: 130 MMHG | WEIGHT: 163 LBS | DIASTOLIC BLOOD PRESSURE: 70 MMHG | HEIGHT: 64 IN | TEMPERATURE: 98.3 F | OXYGEN SATURATION: 98 %

## 2019-06-18 DIAGNOSIS — L30.9 DERMATITIS: Primary | ICD-10-CM

## 2019-06-18 PROCEDURE — 99202 OFFICE O/P NEW SF 15 MIN: CPT | Performed by: DERMATOLOGY

## 2019-06-18 PROCEDURE — G0463 HOSPITAL OUTPT CLINIC VISIT: HCPCS

## 2019-06-18 RX ORDER — UREA 40 %
CREAM (GRAM) TOPICAL
Qty: 60 G | Refills: 3 | Status: SHIPPED | OUTPATIENT
Start: 2019-06-18 | End: 2019-08-16

## 2019-06-18 RX ORDER — TRIAMCINOLONE ACETONIDE 0.25 MG/G
OINTMENT TOPICAL
Qty: 30 G | Refills: 1 | Status: SHIPPED | OUTPATIENT
Start: 2019-06-18 | End: 2019-07-22

## 2019-06-18 ASSESSMENT — PAIN SCALES - GENERAL: PAINLEVEL: NO PAIN (0)

## 2019-06-18 ASSESSMENT — MIFFLIN-ST. JEOR: SCORE: 1209.36

## 2019-06-18 NOTE — NURSING NOTE
"Chief Complaint   Patient presents with     Derm Problem     Dermatits       Initial /70 (BP Location: Right arm, Patient Position: Chair, Cuff Size: Adult Regular)   Pulse 81   Temp 98.3  F (36.8  C) (Tympanic)   Ht 1.626 m (5' 4\")   Wt 73.9 kg (163 lb)   SpO2 98%   BMI 27.98 kg/m   Estimated body mass index is 27.98 kg/m  as calculated from the following:    Height as of this encounter: 1.626 m (5' 4\").    Weight as of this encounter: 73.9 kg (163 lb).  Medication Reconciliation: complete   AMY REYES          "

## 2019-06-18 NOTE — LETTER
6/18/2019       RE: Ena Lanza  [address] [place] [address]     Dear Colleague,    Thank you for referring your patient, Ena Lanza, to the [facility] [place] at [facility]. Please see a copy of my visit note below.    done    Again, thank you for allowing me to participate in the care of your patient.      Sincerely,    DAVE Eden MD

## 2019-06-19 NOTE — CONSULTS
Consult Date:  2019      SUBJECTIVE:  First visit for Ena, who states that she has had dermatitis in the palm of her  hands for many years.  She has had a variety of topical therapies without too much success.  Her feet have been quite normal.  She denies any activity of dermatitis on the rest of her skin surface.      OBJECTIVE:  The right and left palms show a white hyperkeratotic slightly fissured process that suggests a palmar type of psoriasis or palmar hyperkeratotic dermatitis.     A: hyperkeratotic palmar dermatitis, chronic.      PLAN:  Recommend the followin% urea gel alternating every other night with 0.025 triamcinolone cream.  Advised that the urea should soften the skin and allow penetration of the steroid and hopefully that would be helpful.  Another thought for treating this would be oral acitretin, also known as Soriatane, but I would like to try some topical therapies first.  This process does not usually respond to simple topical steroids as  there needs to be penetration of the thick hyperkeratotic surface.      She was reassured and advised to return in 1 to 2 months.  Also had 2 actinic keratoses on the right side of the nose, and I treated these with liquid nitrogen.         DAVE SOLOMON MD             D: 2019   T: 2019   MT: MALCOLM      Name:     ENA PETIT   MRN:      [id number]        Account:       [id number]   :      1940           Consult Date:  2019      Document: [id number]       cc: Amanda SCHWARTZ

## 2019-07-22 ENCOUNTER — OFFICE VISIT (OUTPATIENT)
Dept: DERMATOLOGY | Facility: OTHER | Age: 79
End: 2019-07-22
Attending: DERMATOLOGY
Payer: COMMERCIAL

## 2019-07-22 VITALS
SYSTOLIC BLOOD PRESSURE: 140 MMHG | WEIGHT: 160 LBS | HEART RATE: 98 BPM | OXYGEN SATURATION: 98 % | BODY MASS INDEX: 27.31 KG/M2 | DIASTOLIC BLOOD PRESSURE: 60 MMHG | HEIGHT: 64 IN | TEMPERATURE: 98.7 F

## 2019-07-22 DIAGNOSIS — L40.8 LOCALIZED PUSTULAR PSORIASIS: Primary | ICD-10-CM

## 2019-07-22 DIAGNOSIS — L30.9 DERMATITIS: ICD-10-CM

## 2019-07-22 PROCEDURE — 99213 OFFICE O/P EST LOW 20 MIN: CPT | Performed by: DERMATOLOGY

## 2019-07-22 PROCEDURE — G0463 HOSPITAL OUTPT CLINIC VISIT: HCPCS

## 2019-07-22 RX ORDER — ACITRETIN 10 MG/1
CAPSULE ORAL
Qty: 30 CAPSULE | Refills: 3 | Status: SHIPPED | OUTPATIENT
Start: 2019-07-22 | End: 2019-08-23

## 2019-07-22 RX ORDER — TRIAMCINOLONE ACETONIDE 0.25 MG/G
OINTMENT TOPICAL
Qty: 80 G | Refills: 3 | Status: SHIPPED | OUTPATIENT
Start: 2019-07-22 | End: 2021-03-05

## 2019-07-22 ASSESSMENT — MIFFLIN-ST. JEOR: SCORE: 1190.76

## 2019-07-22 ASSESSMENT — PAIN SCALES - GENERAL: PAINLEVEL: NO PAIN (0)

## 2019-07-22 NOTE — NURSING NOTE
"Chief Complaint   Patient presents with     Derm Problem     f/u dermatits       Initial /60 (BP Location: Left arm, Patient Position: Chair, Cuff Size: Adult Regular)   Pulse 98   Temp 98.7  F (37.1  C) (Tympanic)   Ht 1.626 m (5' 4\")   Wt 72.6 kg (160 lb)   SpO2 98%   BMI 27.46 kg/m   Estimated body mass index is 27.46 kg/m  as calculated from the following:    Height as of this encounter: 1.626 m (5' 4\").    Weight as of this encounter: 72.6 kg (160 lb).  Medication Reconciliation: complete   AMY REYES      "

## 2019-07-22 NOTE — LETTER
2019       RE: Ena Lanza  [address]  [facility] [address]     Dear Colleague,    Thank you for referring your patient, Ena Lanza, to the [facility] at [facility]. Please see a copy of my visit note below.    Visit Date:   2019      SUBJECTIVE:  Ena was seen a month ago for her long-term palmar dermatitis felt to be probably a version of psoriasis.  I recommended a trial of 40% urea cream at night and triamcinolone 0.025 cream during the day.  I advised that this might have been helpful in reducing the fissuring and the scaling of the palm.      Ena states that things are no better.  The urea did cause some softness of the skin but was very painful and caused burning sensations; also, it continues to be somewhat tender and painful from time to time.  Triamcinolone she found helpful.      OBJECTIVE:  Shows a healthy lady with the right palm pretty much unchanged.        ASSESSMENT:  Recalcitrant psoriasis for 20+ years, right palm of the hand.  Elsewhere there is no significant activity.      PLAN:  A trial of 10% LCD and 0.025 triamcinolone. For an oral agent, Acitretin 10 mg per day.  I advised this may be very expensive and not covered by insurance; hopefully it will be.  I advised that she continue moisturization as necessary and return to see me in 3 months.      Medications and allergies were reviewed.         DAVE EDEN MD             D: 2019   T: 2019   MT: CRISTOFER      Name:     ENA LANZA   MRN:      [id number]        Account:      [id number]   :      1940           Visit Date:   2019      Document: [id number]        Again, thank you for allowing me to participate in the care of your patient.      Sincerely,    DAVE Eden MD

## 2019-07-23 ENCOUNTER — TELEPHONE (OUTPATIENT)
Dept: DERMATOLOGY | Facility: OTHER | Age: 79
End: 2019-07-23

## 2019-07-23 NOTE — PROGRESS NOTES
Visit Date:   2019      SUBJECTIVE:  Ena was seen a month ago for her long-term palmar dermatitis felt to be probably a version of psoriasis.  I recommended a trial of 40% urea cream at night and triamcinolone 0.025 cream during the day.  I advised that this might have been helpful in reducing the fissuring and the scaling of the palm.      Ena states that things are no better.  The urea did cause some softness of the skin but was very painful and caused burning sensations; also, it continues to be somewhat tender and painful from time to time.  Triamcinolone she found helpful.      OBJECTIVE:  Shows a healthy lady with the right palm pretty much unchanged.        ASSESSMENT:  Recalcitrant psoriasis for 20+ years, right palm of the hand.  Elsewhere there is no significant activity.      PLAN:  A trial of 10% LCD and 0.025 triamcinolone. For an oral agent, Acitretin 10 mg per day.  I advised this may be very expensive and not covered by insurance; hopefully it will be.  I advised that she continue moisturization as necessary and return to see me in 3 months.      Medications and allergies were reviewed.         DAVE SOLOMON MD             D: 2019   T: 2019   MT: CRISTOFER      Name:     ENA PETIT   MRN:      [id number]        Account:      [id number]   :      1940           Visit Date:   2019      Document: [id number]

## 2019-07-23 NOTE — TELEPHONE ENCOUNTER
"PA NOT NEEDED - [facility]. Submitted request through [facility] and received immediate response from [employer] stating: \"Available without authorization.\" Pharmacy advised. Forms scanned to [facility].  "

## 2019-07-30 ENCOUNTER — TELEPHONE (OUTPATIENT)
Dept: FAMILY MEDICINE | Facility: OTHER | Age: 79
End: 2019-07-30

## 2019-07-30 NOTE — TELEPHONE ENCOUNTER
10:43 AM    Reason for Call: OVERBOOK    Patient is having the following symptoms: arm pain on and ff in both arms for a month for 1 months.    The patient is requesting an appointment for ASAP with Amanda Luu.    Was an appointment offered for this call? Yes  If yes : Appointment type she has one on 08/19/19 does not want to wait this long she is in so much pain               Date    Preferred method for responding to this message: Telephone Call  What is your phone number ?[phone]    If we cannot reach you directly, may we leave a detailed response at the number you provided? Yes    Can this message wait until your PCP/provider returns, if unavailable today? Yes pcp is out     Nina Martinez

## 2019-08-15 NOTE — PROGRESS NOTES
Subjective     Ena Lanza is a 79 year old female who presents to clinic today for the following health issues:    HPI   Musculoskeletal problem/pain      Duration: 1 month    Description  Location: back of both arms    Intensity:  mild    Accompanying signs and symptoms: radiation of pain to both arms and weakness of both arms    History  Previous similar problem: no   Previous evaluation:  none    Precipitating or alleviating factors:  Trauma or overuse: no   Aggravating factors include: climbing stairs, lifting and overuse    Therapies tried and outcome: rest/inactivity        Patient Active Problem List   Diagnosis     Lesion of sciatic nerve     Hyperlipidemia with target LDL less than 100     HTN (hypertension)     DDD (degenerative disc disease), lumbar     Essential hypertension     Type 2 diabetes mellitus without complication (H)     ACP (advance care planning)     Newly recognized heart murmur     Essential hypertension with goal blood pressure less than 140/90     Herpes zoster with complication     Type 2 diabetes mellitus with hyperglycemia, without long-term current use of insulin (H)     Heart murmur     Aortic valve sclerosis     No past surgical history on file.    Social History     Tobacco Use     Smoking status: Never Smoker     Smokeless tobacco: Never Used     Tobacco comment: yes passive exposure   Substance Use Topics     Alcohol use: No     Alcohol/week: 0.0 oz     Family History   Problem Relation Age of Onset     C.A.D. Father 71        cause of death     Cancer Father         melanoma     Coronary Artery Disease Father      Thyroid Disease Mother      Hypertension Mother      Thyroid Disease Sister      Other Cancer Sister         melanoma     Diabetes No family hx of      Hyperlipidemia No family hx of      Asthma No family hx of      Breast Cancer No family hx of      Colon Cancer No family hx of          Current Outpatient Medications   Medication Sig Dispense Refill     acitretin  10 MG CAPS Take as directed 30 capsule 3     aspirin 81 MG chewable tablet Take 1 tablet (81 mg) by mouth daily 108 tablet 3     blood glucose monitoring (ACCU-CHEK FASTCLIX) lancets Use to test blood sugar 2 times daily. 102 Box 5     blood glucose monitoring (ACCU-CHEK CINTHIA SMARTVIEW) meter device kit Use to test blood sugar 2 times daily or as directed. 1 kit 0     blood glucose monitoring (ACCU-CHEK SMARTVIEW) test strip Use to test blood sugar 2 times daily or as directed 100 each 5     blood glucose monitoring (NO BRAND SPECIFIED) meter device kit Use to test blood sugar 3 times daily or as directed. Starting with starting of prednisone. 1 kit 1     ibuprofen (ADVIL/MOTRIN) 800 MG tablet Take 1 tablet (800 mg) by mouth every 8 hours as needed for moderate pain 90 tablet 1     insulin isophane human (HUMULIN N PEN) 100 UNIT/ML injection Inject 12 Units Subcutaneous daily 3 mL 3     lisinopril (PRINIVIL/ZESTRIL) 20 MG tablet Take 1.5 tablets (30 mg) by mouth daily 90 tablet 11     metFORMIN (GLUCOPHAGE-XR) 500 MG 24 hr tablet TAKE 2 TABLETS BY MOUTH TWICE DAILY WITH MEALS 360 tablet 3     predniSONE (DELTASONE) 20 MG tablet Take 3 pills by mouth today.  Then take 2 pills by mouth for one week. Following taper discussed. 60 tablet 0     STATIN NOT PRESCRIBED, INTENTIONAL, Statin not prescribed intentionally due to Intolerance (with supporting documentation of trying a statin at least once within the last 5 years) 0 each 0     triamcinolone (KENALOG) 0.025 % external ointment Apply at bedtime M, W, and Friday 80 g 3     No Known Allergies  Recent Labs   Lab Test 06/11/19  1025 09/18/18  0922 08/21/17  0844  01/19/17  0916 06/13/16  1050 10/21/15  1008   A1C 6.9* 6.5* 5.8  --  6.8* 6.7* 6.4*   * 138*  --   --  107* 110* 143*   HDL 63 75  --   --  69 67 73   TRIG 170* 201*  --   --  168* 202* 199*   ALT 20  --   --   --   --  18 27   CR 0.78 0.79  --    < > 0.74 0.70 0.65   GFRESTIMATED 73 70  --    < > 77 81  "89   GFRESTBLACK 84 85  --    < > >90   GFR Calc   >90   GFR Calc   >90   POTASSIUM 4.5 4.4  --    < > 3.5 3.6 3.9   TSH 1.14  --   --   --   --  1.03  --     < > = values in this interval not displayed.      BP Readings from Last 3 Encounters:   08/16/19 (!) 162/78   07/22/19 140/60   06/18/19 130/70    Wt Readings from Last 3 Encounters:   08/16/19 74.4 kg (164 lb)   07/22/19 72.6 kg (160 lb)   06/18/19 73.9 kg (163 lb)                      Reviewed and updated as needed this visit by Provider         Review of Systems   ROS COMP: Constitutional, HEENT, cardiovascular, pulmonary, gi and gu systems are negative, except as otherwise noted.      Objective    BP (!) 162/78 (BP Location: Right arm, Patient Position: Sitting, Cuff Size: Adult Regular)   Pulse 85   Temp 98.2  F (36.8  C) (Tympanic)   Ht 1.626 m (5' 4\")   Wt 74.4 kg (164 lb)   SpO2 96%   BMI 28.15 kg/m    Body mass index is 28.15 kg/m .  Physical Exam   GENERAL: healthy, alert and no distress  EYES: Eyes grossly normal to inspection, PERRL and conjunctivae and sclerae normal  HENT: ear canals and TM's normal, nose and mouth without ulcers or lesions  NECK: no adenopathy, no asymmetry, masses, or scars and thyroid normal to palpation  RESP: lungs clear to auscultation - no rales, rhonchi or wheezes  CV: regular rate and rhythm, normal S1 S2, no S3 or S4, no murmur, click or rub, no peripheral edema and peripheral pulses strong  ABDOMEN: soft, nontender, no hepatosplenomegaly, no masses and bowel sounds normal  MS: severe aching and can't lift either arm right now quite tender. Very weak and proximal thigh weakness. Minor changes in her vision but feels thinking is not working right.    SKIN: no suspicious lesions or rashes    Diagnostic Test Results:  Labs reviewed in Epic  Results for orders placed or performed in visit on 08/16/19 (from the past 24 hour(s))   CRP inflammation   Result Value Ref Range    CRP " Inflammation 17.3 (H) 0.0 - 8.0 mg/L   ESR: Erythrocyte sedimentation rate   Result Value Ref Range    Sed Rate 40 (H) 0 - 30 mm/h   CBC with platelets and differential   Result Value Ref Range    WBC 10.5 4.0 - 11.0 10e9/L    RBC Count 3.89 3.8 - 5.2 10e12/L    Hemoglobin 11.7 11.7 - 15.7 g/dL    Hematocrit 35.1 35.0 - 47.0 %    MCV 90 78 - 100 fl    MCH 30.1 26.5 - 33.0 pg    MCHC 33.3 31.5 - 36.5 g/dL    RDW 13.4 10.0 - 15.0 %    Platelet Count 328 150 - 450 10e9/L    Diff Method Automated Method     % Neutrophils 75.0 %    % Lymphocytes 14.5 %    % Monocytes 7.7 %    % Eosinophils 2.1 %    % Basophils 0.4 %    % Immature Granulocytes 0.3 %    Nucleated RBCs 0 0 /100    Absolute Neutrophil 7.9 1.6 - 8.3 10e9/L    Absolute Lymphocytes 1.5 0.8 - 5.3 10e9/L    Absolute Monocytes 0.8 0.0 - 1.3 10e9/L    Absolute Eosinophils 0.2 0.0 - 0.7 10e9/L    Absolute Basophils 0.0 0.0 - 0.2 10e9/L    Abs Immature Granulocytes 0.0 0 - 0.4 10e9/L    Absolute Nucleated RBC 0.0    Uric acid   Result Value Ref Range    Uric Acid 5.6 2.6 - 6.0 mg/dL           Assessment & Plan     1. Myalgia  Has been living with this for a month. States has thinking issues also. Feels like her pain is so bad she can't think right. We will obtain needed testing but top differential is PMR.   - CRP inflammation  - ESR: Erythrocyte sedimentation rate  - CBC with platelets and differential  - Lyme Disease Tia with reflex to WB Serum  - Rheumatoid factor  - Uric acid  - DNA double stranded antibodies  - Aldolase    2. Polymyalgia rheumatica (H)  Her pain is significant morning stiffness is not doable. Can't stand the pain in her arms. Checking for all Rheumatological disease also.   - predniSONE (DELTASONE) 20 MG tablet; Take 3 pills by mouth today.  Then take 2 pills by mouth for one week. Following taper discussed.  Dispense: 60 tablet; Refill: 0    3. Type 2 diabetes mellitus with hyperglycemia, with long-term current use of insulin (H)  Cover her  "with insulin for a while until we get under 5 mg of prednisone.  Her sugars at home are over 300 or better. Can't seem to get them down. Given new glucose meter. Will not start until after she sees that her sugars are continued to be elevated over 300. If down near normal 150 or less won't take.  Will see diabetic Ed next week. We did follow the diabetic Ed protocol for adding in NPH while on prednisone long term.   - blood glucose monitoring (NO BRAND SPECIFIED) meter device kit; Use to test blood sugar 3 times daily or as directed. Starting with starting of prednisone.  Dispense: 1 kit; Refill: 1  - Basic metabolic panel  - insulin isophane human (HUMULIN N PEN) 100 UNIT/ML injection; Inject 12 Units Subcutaneous daily  Dispense: 3 mL; Refill: 3  - DIABETES EDUCATION REFERRAL ([place])     BMI:   Estimated body mass index is 28.15 kg/m  as calculated from the following:    Height as of this encounter: 1.626 m (5' 4\").    Weight as of this encounter: 74.4 kg (164 lb).           See Patient Instructions    No follow-ups on file.    EFREN Troy  [facility] - [place]      "

## 2019-08-16 ENCOUNTER — OFFICE VISIT (OUTPATIENT)
Dept: FAMILY MEDICINE | Facility: OTHER | Age: 79
End: 2019-08-16
Attending: PHYSICIAN ASSISTANT
Payer: MEDICARE

## 2019-08-16 VITALS
OXYGEN SATURATION: 96 % | WEIGHT: 164 LBS | SYSTOLIC BLOOD PRESSURE: 162 MMHG | HEIGHT: 64 IN | DIASTOLIC BLOOD PRESSURE: 78 MMHG | BODY MASS INDEX: 28 KG/M2 | TEMPERATURE: 98.2 F | HEART RATE: 85 BPM

## 2019-08-16 DIAGNOSIS — Z79.4 TYPE 2 DIABETES MELLITUS WITH HYPERGLYCEMIA, WITH LONG-TERM CURRENT USE OF INSULIN (H): ICD-10-CM

## 2019-08-16 DIAGNOSIS — M79.10 MYALGIA: Primary | ICD-10-CM

## 2019-08-16 DIAGNOSIS — M35.3 POLYMYALGIA RHEUMATICA (H): ICD-10-CM

## 2019-08-16 DIAGNOSIS — E11.65 TYPE 2 DIABETES MELLITUS WITH HYPERGLYCEMIA, WITH LONG-TERM CURRENT USE OF INSULIN (H): ICD-10-CM

## 2019-08-16 LAB
ANION GAP SERPL CALCULATED.3IONS-SCNC: 8 MMOL/L (ref 3–14)
BASOPHILS # BLD AUTO: 0 10E9/L (ref 0–0.2)
BASOPHILS NFR BLD AUTO: 0.4 %
BUN SERPL-MCNC: 21 MG/DL (ref 7–30)
CALCIUM SERPL-MCNC: 9.6 MG/DL (ref 8.5–10.1)
CHLORIDE SERPL-SCNC: 109 MMOL/L (ref 94–109)
CO2 SERPL-SCNC: 26 MMOL/L (ref 20–32)
CREAT SERPL-MCNC: 0.98 MG/DL (ref 0.52–1.04)
CRP SERPL-MCNC: 17.3 MG/L (ref 0–8)
DIFFERENTIAL METHOD BLD: NORMAL
EOSINOPHIL # BLD AUTO: 0.2 10E9/L (ref 0–0.7)
EOSINOPHIL NFR BLD AUTO: 2.1 %
ERYTHROCYTE [DISTWIDTH] IN BLOOD BY AUTOMATED COUNT: 13.4 % (ref 10–15)
ERYTHROCYTE [SEDIMENTATION RATE] IN BLOOD BY WESTERGREN METHOD: 40 MM/H (ref 0–30)
GFR SERPL CREATININE-BSD FRML MDRD: 55 ML/MIN/{1.73_M2}
GLUCOSE SERPL-MCNC: 200 MG/DL (ref 70–99)
HCT VFR BLD AUTO: 35.1 % (ref 35–47)
HGB BLD-MCNC: 11.7 G/DL (ref 11.7–15.7)
IMM GRANULOCYTES # BLD: 0 10E9/L (ref 0–0.4)
IMM GRANULOCYTES NFR BLD: 0.3 %
LYMPHOCYTES # BLD AUTO: 1.5 10E9/L (ref 0.8–5.3)
LYMPHOCYTES NFR BLD AUTO: 14.5 %
MCH RBC QN AUTO: 30.1 PG (ref 26.5–33)
MCHC RBC AUTO-ENTMCNC: 33.3 G/DL (ref 31.5–36.5)
MCV RBC AUTO: 90 FL (ref 78–100)
MONOCYTES # BLD AUTO: 0.8 10E9/L (ref 0–1.3)
MONOCYTES NFR BLD AUTO: 7.7 %
NEUTROPHILS # BLD AUTO: 7.9 10E9/L (ref 1.6–8.3)
NEUTROPHILS NFR BLD AUTO: 75 %
NRBC # BLD AUTO: 0 10*3/UL
NRBC BLD AUTO-RTO: 0 /100
PLATELET # BLD AUTO: 328 10E9/L (ref 150–450)
POTASSIUM SERPL-SCNC: 4.1 MMOL/L (ref 3.4–5.3)
RBC # BLD AUTO: 3.89 10E12/L (ref 3.8–5.2)
SODIUM SERPL-SCNC: 143 MMOL/L (ref 133–144)
URATE SERPL-MCNC: 5.6 MG/DL (ref 2.6–6)
WBC # BLD AUTO: 10.5 10E9/L (ref 4–11)

## 2019-08-16 PROCEDURE — 84550 ASSAY OF BLOOD/URIC ACID: CPT | Mod: ZL | Performed by: PHYSICIAN ASSISTANT

## 2019-08-16 PROCEDURE — 86618 LYME DISEASE ANTIBODY: CPT | Mod: ZL | Performed by: PHYSICIAN ASSISTANT

## 2019-08-16 PROCEDURE — 85652 RBC SED RATE AUTOMATED: CPT | Mod: ZL | Performed by: PHYSICIAN ASSISTANT

## 2019-08-16 PROCEDURE — 82085 ASSAY OF ALDOLASE: CPT | Mod: ZL | Performed by: PHYSICIAN ASSISTANT

## 2019-08-16 PROCEDURE — 99000 SPECIMEN HANDLING OFFICE-LAB: CPT | Performed by: PHYSICIAN ASSISTANT

## 2019-08-16 PROCEDURE — 99214 OFFICE O/P EST MOD 30 MIN: CPT | Performed by: PHYSICIAN ASSISTANT

## 2019-08-16 PROCEDURE — 86140 C-REACTIVE PROTEIN: CPT | Mod: ZL | Performed by: PHYSICIAN ASSISTANT

## 2019-08-16 PROCEDURE — G0463 HOSPITAL OUTPT CLINIC VISIT: HCPCS

## 2019-08-16 PROCEDURE — 36415 COLL VENOUS BLD VENIPUNCTURE: CPT | Mod: ZL | Performed by: PHYSICIAN ASSISTANT

## 2019-08-16 PROCEDURE — 86431 RHEUMATOID FACTOR QUANT: CPT | Mod: ZL | Performed by: PHYSICIAN ASSISTANT

## 2019-08-16 PROCEDURE — 86225 DNA ANTIBODY NATIVE: CPT | Mod: ZL | Performed by: PHYSICIAN ASSISTANT

## 2019-08-16 PROCEDURE — 80048 BASIC METABOLIC PNL TOTAL CA: CPT | Mod: ZL | Performed by: PHYSICIAN ASSISTANT

## 2019-08-16 PROCEDURE — 85025 COMPLETE CBC W/AUTO DIFF WBC: CPT | Mod: ZL | Performed by: PHYSICIAN ASSISTANT

## 2019-08-16 RX ORDER — PREDNISONE 20 MG/1
TABLET ORAL
Qty: 60 TABLET | Refills: 0 | Status: SHIPPED | OUTPATIENT
Start: 2019-08-16 | End: 2020-04-22

## 2019-08-16 ASSESSMENT — MIFFLIN-ST. JEOR: SCORE: 1208.9

## 2019-08-16 ASSESSMENT — PAIN SCALES - GENERAL: PAINLEVEL: MODERATE PAIN (5)

## 2019-08-16 NOTE — NURSING NOTE
"Chief Complaint   Patient presents with     Musculoskeletal Problem       Initial BP (!) 162/78 (BP Location: Right arm, Patient Position: Sitting, Cuff Size: Adult Regular)   Pulse 85   Temp 98.2  F (36.8  C) (Tympanic)   Ht 1.626 m (5' 4\")   Wt 74.4 kg (164 lb)   SpO2 96%   BMI 28.15 kg/m   Estimated body mass index is 28.15 kg/m  as calculated from the following:    Height as of this encounter: 1.626 m (5' 4\").    Weight as of this encounter: 74.4 kg (164 lb).  Medication Reconciliation: complete   Judi Arnold LPN    "

## 2019-08-16 NOTE — PATIENT INSTRUCTIONS
Patient Education     Treatment for Polymyalgia Rheumatica  Polymyalgia rheumatica (PMR) is an inflammatory condition that can cause aching and stiffness. It tends to affect the neck, shoulders, and hips. The aching and stiffness are usually worse in the morning.  Types of treatment  Steroid medicine is the main treatment for PMR. Your healthcare provider will start you on a low dose of this medicine. You should start to feel better soon after starting. When your symptoms are better, your healthcare provider will slowly lower the amount of medicine. If your symptoms return, he or she will increase the dose. You may need to take steroid medicine for a few years. Return of symptoms is common, so you may need to take steroid medicine again in the future. If untreated, PMR may go away on its own after several years. But symptoms will likely return.  Watching for giant cell arteritis  Some people with PMR also have a condition called giant cell arteritis. It is also called temporal arteritis or Diaz arteritis. This is inflammation of blood vessels in the head, neck, and arms. This can narrow or block the blood vessels. It can cause problems from less blood flow through those vessels. Giant cell arteritis can cause symptoms such as:    Headaches    Changes in vision    Jaw pain, especially when chewing    Scalp pain    Scalp sores (ulcers)    High fevers  Possible complications of giant cell arteritis may include blindness or stroke.  Giant cell arteritis can also be treated with steroid medicine. Research on a medicine that suppresses the immune system shows that it may be used to treat giant cell arteritis and PMR in the future. The medicine is called tocilizumab.  Risks of long-term steroid use  Steroid medicine has some risks. Talk with your healthcare provider about the risks and benefits for you. Some of the possible risks of taking steroids for a long time can include:    High blood  pressure    Diabetes    Glaucoma    Cataracts    Osteoporosis    Fluid retention    Weight gain    Roundness of the face    Stomach irritation    Trouble sleeping    Muscle wasting    Skin thinning    Easy bruising    Poor wound healing    Higher risk for infections  Living with polymyalgia rheumatica  If you have PMR, your symptoms will get better with treatment. Once you start feeling better, you can return to your normal activities. Your healthcare provider will track your symptoms and adjust your steroid dose until you are on the lowest dose needed. Small changes in steroid doses can have a big effect on your symptoms. Make sure to follow your healthcare provider s instructions.  When to call 911  Call 911 if you have symptoms of a stroke, such as:    Severe headache    Trouble seeing    Balance or coordination problems    Weakness or numbness    Confusion    Trouble speaking  If you think you are having a stroke, note the time when your symptoms started.      When to call your healthcare provider  Call your healthcare provider if you have any of the following:    Symptoms that don t get better with treatment    Symptoms that get worse    Symptoms of giant cell arteritis   Date Last Reviewed: 5/1/2017 2000-2018 [employer]. [address] [place] [address]. All rights reserved. This information is not intended as a substitute for professional medical care. Always follow your healthcare professional's instructions.           Thank you for choosing [facility].   I have office hours 8:00 am to 4:30 pm on Monday's, Wednesday's, Thursday's and Friday's. My nurse and I are out of the office every Tuesday.    Following your visit, when your labs and diagnostic testing have returned, I will review then and you will be contacted by my nurse.  If you are on My Chart, you can also view results there.    For refills, notify your pharmacy regarding what you need and the pharmacy will  generate a refill request. Do not call my nurse as she is unable to process refill request. Please plan ahead and allow 3-5 days for refill requests.    You will generally receive a reminder call the day prior to your appointment.  If you cannot attend your appointment, please cancel your appointment with as much notice as possible.  If there is a pattern of failure to present for your appointments, I cannot provide consistent, meaningful, ongoing care for you. It is very important to me that you come in for your care, so we can best assist you with your health care needs.    IMPORTANT:  Please note that it is my standard of practice to NOT participate in prescribing ongoing requested Narcotic Analgesic therapy, and/or participate in the prescribing of other controlled substances.  My nurse and I am happy to assist you with the process of referral for alternative pain management as needed, and other treatment modalities including but not limited to:  Physical Therapy, Physical Medicine and Rehab, Counseling, Chiropractic Care, Orthopedic Care, and non-narcotic medication management.     In the event that you need to be seen for emergent concerns and I am out of office,  please see one of my colleagues for acute concerns.  You may also present to  or ER.  I appreciate the opportunity to serve you and look forward to supporting your healthcare needs in the future. Please contact me with any questions or concerns that you may have.    Sincerely,      Amanda Luu RN, PAAndriaC

## 2019-08-20 DIAGNOSIS — E11.65 TYPE 2 DIABETES MELLITUS WITH HYPERGLYCEMIA, WITHOUT LONG-TERM CURRENT USE OF INSULIN (H): ICD-10-CM

## 2019-08-20 LAB
B BURGDOR IGG+IGM SER QL: 0.13 (ref 0–0.89)
DSDNA AB SER-ACNC: 1 IU/ML
RHEUMATOID FACT SER NEPH-ACNC: <20 IU/ML (ref 0–20)

## 2019-08-20 RX ORDER — LANCETS
EACH MISCELLANEOUS
Qty: 200 EACH | Refills: 0 | Status: SHIPPED | OUTPATIENT
Start: 2019-08-20 | End: 2019-09-13

## 2019-08-20 NOTE — PROGRESS NOTES
Subjective     Ena Lanza is a 79 year old female who presents to clinic today for the following health issues:    HPI   Musculoskeletal problem/pain      Duration: f/u from 8-16-19    Description  Location: back of both arms     Intensity:  mild    Accompanying signs and symptoms: radiation of pain to both arms and weakness of both arms    History  Previous similar problem: no   Previous evaluation:  Labs     Precipitating or alleviating factors:  Trauma or overuse: no   Aggravating factors include: climbing stairs, lifting and overuse    Therapies tried and outcome: rest/inactivity and prednisone         Patient Active Problem List   Diagnosis     Lesion of sciatic nerve     Hyperlipidemia with target LDL less than 100     HTN (hypertension)     DDD (degenerative disc disease), lumbar     Essential hypertension     Type 2 diabetes mellitus without complication (H)     ACP (advance care planning)     Newly recognized heart murmur     Essential hypertension with goal blood pressure less than 140/90     Herpes zoster with complication     Type 2 diabetes mellitus with hyperglycemia, without long-term current use of insulin (H)     Heart murmur     Aortic valve sclerosis     No past surgical history on file.    Social History     Tobacco Use     Smoking status: Never Smoker     Smokeless tobacco: Never Used     Tobacco comment: yes passive exposure   Substance Use Topics     Alcohol use: No     Alcohol/week: 0.0 oz     Family History   Problem Relation Age of Onset     C.A.D. Father 71        cause of death     Cancer Father         melanoma     Coronary Artery Disease Father      Thyroid Disease Mother      Hypertension Mother      Thyroid Disease Sister      Other Cancer Sister         melanoma     Diabetes No family hx of      Hyperlipidemia No family hx of      Asthma No family hx of      Breast Cancer No family hx of      Colon Cancer No family hx of          Current Outpatient Medications   Medication Sig  Dispense Refill     acitretin 10 MG CAPS Take as directed 30 capsule 3     aspirin 81 MG chewable tablet Take 1 tablet (81 mg) by mouth daily 108 tablet 3     blood glucose (ACCU-CHEK SMARTVIEW) test strip Use to test blood sugar 2 times daily or as directed 100 each 3     blood glucose monitoring (ACCU-CHEK FASTCLIX) lancets Use to test blood sugar 2 times daily. 200 each 0     blood glucose monitoring (ACCU-CHEK CINTHIA SMARTVIEW) meter device kit Use to test blood sugar 2 times daily or as directed. 1 kit 0     blood glucose monitoring (NO BRAND SPECIFIED) meter device kit Use to test blood sugar 3 times daily or as directed. Starting with starting of prednisone. 1 kit 1     ibuprofen (ADVIL/MOTRIN) 800 MG tablet Take 1 tablet (800 mg) by mouth every 8 hours as needed for moderate pain 90 tablet 1     insulin isophane human (HUMULIN N PEN) 100 UNIT/ML injection Inject 12 Units Subcutaneous daily 3 mL 3     lisinopril (PRINIVIL/ZESTRIL) 20 MG tablet Take 1.5 tablets (30 mg) by mouth daily 90 tablet 11     metFORMIN (GLUCOPHAGE-XR) 500 MG 24 hr tablet TAKE 2 TABLETS BY MOUTH TWICE DAILY WITH MEALS 360 tablet 3     predniSONE (DELTASONE) 20 MG tablet Take 3 pills by mouth today.  Then take 2 pills by mouth for one week. Following taper discussed. 60 tablet 0     STATIN NOT PRESCRIBED, INTENTIONAL, Statin not prescribed intentionally due to Intolerance (with supporting documentation of trying a statin at least once within the last 5 years) 0 each 0     triamcinolone (KENALOG) 0.025 % external ointment Apply at bedtime M, W, and Friday 80 g 3     No Known Allergies  Recent Labs   Lab Test 08/16/19  1407 06/11/19  1025 09/18/18  0922 08/21/17  0844  01/19/17  0916 06/13/16  1050 10/21/15  1008   A1C  --  6.9* 6.5* 5.8  --  6.8* 6.7* 6.4*   LDL  --  153* 138*  --   --  107* 110* 143*   HDL  --  63 75  --   --  69 67 73   TRIG  --  170* 201*  --   --  168* 202* 199*   ALT  --  20  --   --   --   --  18 27   CR 0.98 0.78  0.79  --    < > 0.74 0.70 0.65   GFRESTIMATED 55* 73 70  --    < > 77 81 89   GFRESTBLACK 64 84 85  --    < > >90   GFR Calc   >90   GFR Calc   >90   POTASSIUM 4.1 4.5 4.4  --    < > 3.5 3.6 3.9   TSH  --  1.14  --   --   --   --  1.03  --     < > = values in this interval not displayed.      BP Readings from Last 3 Encounters:   08/22/19 (!) 154/64   08/16/19 (!) 162/78   07/22/19 140/60    Wt Readings from Last 3 Encounters:   08/22/19 73.9 kg (163 lb)   08/16/19 74.4 kg (164 lb)   07/22/19 72.6 kg (160 lb)                      Reviewed and updated as needed this visit by Provider         Review of Systems   ROS COMP: Constitutional, HEENT, cardiovascular, pulmonary, gi and gu systems are negative, except as otherwise noted.      Objective    There were no vitals taken for this visit.  There is no height or weight on file to calculate BMI.  Physical Exam   GENERAL: healthy, alert and no distress  EYES: Eyes grossly normal to inspection, PERRL and conjunctivae and sclerae normal  HENT: ear canals and TM's normal, nose and mouth without ulcers or lesions  NECK: no adenopathy, no asymmetry, masses, or scars and thyroid normal to palpation  RESP: lungs clear to auscultation - no rales, rhonchi or wheezes  CV: regular rate and rhythm, normal S1 S2, no S3 or S4, no murmur, click or rub, no peripheral edema and peripheral pulses strong  ABDOMEN: soft, nontender, no hepatosplenomegaly, no masses and bowel sounds normal  MS: no gross musculoskeletal defects noted, no edema  PSYCH: mentation appears normal, affect normal/bright    Diagnostic Test Results:  Labs reviewed in Epic  Results for orders placed or performed in visit on 08/16/19   CRP inflammation   Result Value Ref Range    CRP Inflammation 17.3 (H) 0.0 - 8.0 mg/L   ESR: Erythrocyte sedimentation rate   Result Value Ref Range    Sed Rate 40 (H) 0 - 30 mm/h   CBC with platelets and differential   Result Value Ref Range    WBC 10.5  "4.0 - 11.0 10e9/L    RBC Count 3.89 3.8 - 5.2 10e12/L    Hemoglobin 11.7 11.7 - 15.7 g/dL    Hematocrit 35.1 35.0 - 47.0 %    MCV 90 78 - 100 fl    MCH 30.1 26.5 - 33.0 pg    MCHC 33.3 31.5 - 36.5 g/dL    RDW 13.4 10.0 - 15.0 %    Platelet Count 328 150 - 450 10e9/L    Diff Method Automated Method     % Neutrophils 75.0 %    % Lymphocytes 14.5 %    % Monocytes 7.7 %    % Eosinophils 2.1 %    % Basophils 0.4 %    % Immature Granulocytes 0.3 %    Nucleated RBCs 0 0 /100    Absolute Neutrophil 7.9 1.6 - 8.3 10e9/L    Absolute Lymphocytes 1.5 0.8 - 5.3 10e9/L    Absolute Monocytes 0.8 0.0 - 1.3 10e9/L    Absolute Eosinophils 0.2 0.0 - 0.7 10e9/L    Absolute Basophils 0.0 0.0 - 0.2 10e9/L    Abs Immature Granulocytes 0.0 0 - 0.4 10e9/L    Absolute Nucleated RBC 0.0    Lyme Disease Tia with reflex to WB Serum   Result Value Ref Range    Lyme Disease Antibodies Serum 0.13 0.00 - 0.89   Rheumatoid factor   Result Value Ref Range    Rheumatoid Factor <20 <20 IU/mL   Uric acid   Result Value Ref Range    Uric Acid 5.6 2.6 - 6.0 mg/dL   DNA double stranded antibodies   Result Value Ref Range    DNA-ds 1 <10 IU/mL   Aldolase   Result Value Ref Range    Aldolase 1.4 (L) 1.5 - 8.1 U/L   Basic metabolic panel   Result Value Ref Range    Sodium 143 133 - 144 mmol/L    Potassium 4.1 3.4 - 5.3 mmol/L    Chloride 109 94 - 109 mmol/L    Carbon Dioxide 26 20 - 32 mmol/L    Anion Gap 8 3 - 14 mmol/L    Glucose 200 (H) 70 - 99 mg/dL    Urea Nitrogen 21 7 - 30 mg/dL    Creatinine 0.98 0.52 - 1.04 mg/dL    GFR Estimate 55 (L) >60 mL/min/[1.73_m2]    GFR Estimate If Black 64 >60 mL/min/[1.73_m2]    Calcium 9.6 8.5 - 10.1 mg/dL           Assessment & Plan          BMI:   Estimated body mass index is 27.98 kg/m  as calculated from the following:    Height as of this encounter: 1.626 m (5' 4\").    Weight as of this encounter: 73.9 kg (163 lb).     1. PMR (polymyalgia rheumatica) (H)  After two days she felt amazing. She is going to drop to 30 " mg now for a week or two.  See her back and come see us in two weeks. Has taken her insulin 4 times and will see us back in 2 weeks.  Moving freely today. Smiling and very happy she no longer has pain.   - ESR: Erythrocyte sedimentation rate  - ESR: Erythrocyte sedimentation rate; Future        See Patient Instructions    No follow-ups on file. 2 weeks.     EFREN Troy  [facility] - [place]

## 2019-08-21 LAB — ALDOLASE SERPL-CCNC: 1.4 U/L (ref 1.5–8.1)

## 2019-08-22 ENCOUNTER — OFFICE VISIT (OUTPATIENT)
Dept: FAMILY MEDICINE | Facility: OTHER | Age: 79
End: 2019-08-22
Attending: PHYSICIAN ASSISTANT
Payer: MEDICARE

## 2019-08-22 VITALS
OXYGEN SATURATION: 96 % | BODY MASS INDEX: 27.83 KG/M2 | DIASTOLIC BLOOD PRESSURE: 64 MMHG | WEIGHT: 163 LBS | HEART RATE: 78 BPM | SYSTOLIC BLOOD PRESSURE: 154 MMHG | TEMPERATURE: 98.8 F | HEIGHT: 64 IN

## 2019-08-22 DIAGNOSIS — M35.3 PMR (POLYMYALGIA RHEUMATICA) (H): Primary | ICD-10-CM

## 2019-08-22 LAB — ERYTHROCYTE [SEDIMENTATION RATE] IN BLOOD BY WESTERGREN METHOD: 16 MM/H (ref 0–30)

## 2019-08-22 PROCEDURE — 36415 COLL VENOUS BLD VENIPUNCTURE: CPT | Mod: ZL | Performed by: PHYSICIAN ASSISTANT

## 2019-08-22 PROCEDURE — G0463 HOSPITAL OUTPT CLINIC VISIT: HCPCS | Mod: 25

## 2019-08-22 PROCEDURE — 85652 RBC SED RATE AUTOMATED: CPT | Mod: ZL | Performed by: PHYSICIAN ASSISTANT

## 2019-08-22 PROCEDURE — 99213 OFFICE O/P EST LOW 20 MIN: CPT | Performed by: PHYSICIAN ASSISTANT

## 2019-08-22 PROCEDURE — G0463 HOSPITAL OUTPT CLINIC VISIT: HCPCS

## 2019-08-22 ASSESSMENT — PAIN SCALES - GENERAL: PAINLEVEL: NO PAIN (0)

## 2019-08-22 ASSESSMENT — MIFFLIN-ST. JEOR: SCORE: 1204.36

## 2019-08-22 NOTE — PATIENT INSTRUCTIONS
Thank you for choosing [facility].   I have office hours 8:00 am to 4:30 pm on Monday's, Wednesday's, Thursday's and Friday's. My nurse and I are out of the office every Tuesday.    Following your visit, when your labs and diagnostic testing have returned, I will review then and you will be contacted by my nurse.  If you are on My Chart, you can also view results there.    For refills, notify your pharmacy regarding what you need and the pharmacy will generate a refill request. Do not call my nurse as she is unable to process refill request. Please plan ahead and allow 3-5 days for refill requests.    You will generally receive a reminder call the day prior to your appointment.  If you cannot attend your appointment, please cancel your appointment with as much notice as possible.  If there is a pattern of failure to present for your appointments, I cannot provide consistent, meaningful, ongoing care for you. It is very important to me that you come in for your care, so we can best assist you with your health care needs.    IMPORTANT:  Please note that it is my standard of practice to NOT participate in prescribing ongoing requested Narcotic Analgesic therapy, and/or participate in the prescribing of other controlled substances.  My nurse and I am happy to assist you with the process of referral for alternative pain management as needed, and other treatment modalities including but not limited to:  Physical Therapy, Physical Medicine and Rehab, Counseling, Chiropractic Care, Orthopedic Care, and non-narcotic medication management.     In the event that you need to be seen for emergent concerns and I am out of office,  please see one of my colleagues for acute concerns.  You may also present to  or ER.  I appreciate the opportunity to serve you and look forward to supporting your healthcare needs in the future. Please contact me with any questions or concerns that you may  have.    Sincerely,      Amanda Luu RN, PA-C

## 2019-08-22 NOTE — NURSING NOTE
"Chief Complaint   Patient presents with     Musculoskeletal Problem       Initial BP (!) 154/64 (BP Location: Right arm, Patient Position: Sitting, Cuff Size: Adult Regular)   Pulse 78   Temp 98.8  F (37.1  C) (Tympanic)   Ht 1.626 m (5' 4\")   Wt 73.9 kg (163 lb)   SpO2 96%   BMI 27.98 kg/m   Estimated body mass index is 27.98 kg/m  as calculated from the following:    Height as of this encounter: 1.626 m (5' 4\").    Weight as of this encounter: 73.9 kg (163 lb).  Medication Reconciliation: complete  "

## 2019-08-23 ENCOUNTER — HOSPITAL ENCOUNTER (OUTPATIENT)
Dept: EDUCATION SERVICES | Facility: HOSPITAL | Age: 79
Discharge: HOME | End: 2019-08-23
Attending: PHYSICIAN ASSISTANT | Admitting: PHYSICIAN ASSISTANT
Payer: MEDICARE

## 2019-08-23 VITALS
SYSTOLIC BLOOD PRESSURE: 150 MMHG | RESPIRATION RATE: 16 BRPM | OXYGEN SATURATION: 98 % | DIASTOLIC BLOOD PRESSURE: 69 MMHG | WEIGHT: 162.7 LBS | HEART RATE: 73 BPM | BODY MASS INDEX: 27.11 KG/M2 | HEIGHT: 65 IN

## 2019-08-23 DIAGNOSIS — E11.65 TYPE 2 DIABETES MELLITUS WITH HYPERGLYCEMIA, WITH LONG-TERM CURRENT USE OF INSULIN (H): ICD-10-CM

## 2019-08-23 DIAGNOSIS — Z79.4 TYPE 2 DIABETES MELLITUS WITH HYPERGLYCEMIA, WITH LONG-TERM CURRENT USE OF INSULIN (H): ICD-10-CM

## 2019-08-23 PROCEDURE — G0108 DIAB MANAGE TRN  PER INDIV: HCPCS | Performed by: DIETITIAN, REGISTERED

## 2019-08-23 ASSESSMENT — MIFFLIN-ST. JEOR: SCORE: 1214.91

## 2019-08-23 ASSESSMENT — PAIN SCALES - GENERAL: PAINLEVEL: NO PAIN (0)

## 2019-08-23 NOTE — LETTER
"    8/23/2019        RE: Ena Lanza  [id number] [facility] [id number]        Diabetes Self-Management Education & Support    Diabetes Education Self Management & Training    SUBJECTIVE/OBJECTIVE:  Presents for: Individual review  Accompanied by: Self  Diabetes education in the past 24mo: No  Focus of Visit: Taking Medication, Assistance w/ making life changes  Diabetes type: Type 2  Date of diagnosis: Approximately 6 years ago.   Disease course: Worsening(Glucose levels increased r/t Prednisone)  How confident are you filling out medical forms by yourself:: Quite a bit  Diabetes management related comments/concerns: Elevated glucose levels r/t Prednisone  Transportation concerns: No  Other concerns:: None  Cultural Influences/Ethnic Background:  American    Diabetes Symptoms & Complications  Blurred vision: No  Fatigue: No  Neuropathy: Yes(Occasionally tingling in feet)  Foot ulcerations: No  Polydipsia: No  Polyphagia: No  Polyuria: No  Visual change: No  Weakness: No  Weight loss: No  Slow healing wounds: No  Recent Infection(s): No  Other: Yes(Recent dx of PMR. )  Symptom course: Stable  Weight trend: Stable  Autonomic neuropathy: No  CVA: No  Heart disease: No  Nephropathy: No  Peripheral neuropathy: No  Peripheral Vascular Disease: No  Retinopathy: No    Patient Problem List and Family Medical History reviewed for relevant medical history, current medical status, and diabetes risk factors.    Vitals:  /69   Pulse 73   Resp 16   Ht 1.645 m (5' 4.75\")   Wt 73.8 kg (162 lb 11.2 oz)   SpO2 98%   BMI 27.28 kg/m     Estimated body mass index is 27.28 kg/m  as calculated from the following:    Height as of this encounter: 1.645 m (5' 4.75\").    Weight as of this encounter: 73.8 kg (162 lb 11.2 oz).   Last 3 BP:   BP Readings from Last 3 Encounters:   08/23/19 150/69   08/22/19 (!) 154/64   08/16/19 (!) 162/78       History   Smoking Status     Never Smoker   Smokeless Tobacco     Never Used     Comment: " yes passive exposure       Labs:  Lab Results   Component Value Date    A1C 6.9 06/11/2019     Lab Results   Component Value Date     08/16/2019     Lab Results   Component Value Date     06/11/2019     HDL Cholesterol   Date Value Ref Range Status   06/11/2019 63 >49 mg/dL Final   ]  GFR Estimate   Date Value Ref Range Status   08/16/2019 55 (L) >60 mL/min/[1.73_m2] Final     Comment:     Non  GFR Calc  Starting 12/18/2018, serum creatinine based estimated GFR (eGFR) will be   calculated using the Chronic Kidney Disease Epidemiology Collaboration   (CKD-EPI) equation.       GFR Estimate If Black   Date Value Ref Range Status   08/16/2019 64 >60 mL/min/[1.73_m2] Final     Comment:      GFR Calc  Starting 12/18/2018, serum creatinine based estimated GFR (eGFR) will be   calculated using the Chronic Kidney Disease Epidemiology Collaboration   (CKD-EPI) equation.       Lab Results   Component Value Date    CR 0.98 08/16/2019     No results found for: MICROALBUMIN    Healthy Eating  Healthy Eating Assessed Today: Yes  Cultural/Jainism diet restrictions?: No  Patient on a regular basis: Eats 3 meals a day    Being Active  Being Active Assessed Today: Yes    Monitoring  Monitoring Assessed Today: Yes  Did patient bring glucose meter to appointment? : No  Blood Glucose Meter: Accu-check  Home Glucose (Sugar) Monitoring: 3-4 times per day  Pt reports fasting levels 105-115.  She then takes Prednisone and levels at 1-1:30 pm are high 200's to mid 300's.  Evening levels are around 250.     Taking Medications  Diabetes Medication(s)     Biguanides       metFORMIN (GLUCOPHAGE-XR) 500 MG 24 hr tablet    TAKE 2 TABLETS BY MOUTH TWICE DAILY WITH MEALS    Insulin       insulin isophane human (HUMULIN N PEN) 100 UNIT/ML injection    Inject 12 Units Subcutaneous daily        Taking Medication Assessed Today: Yes  Current Treatments: Diet, Oral Agent (monotherapy), Insulin  Injections(Metformin XR 1000 mg at supper - increased to 1000 mg bid with Prednisone.  Pt also has NPH pen now for high levels on Prednisone - has taken 12 units x3. )  Given by: Patient  Injection/Infusion sites: Abdomen  Problems taking diabetes medications regularly?: No  Diabetes medication side effects?: No  Treatment Compliance: All of the time    Problem Solving  Problem Solving Assessed Today: Yes  Hypoglycemia Frequency: Never    Reducing Risks  Reducing Risks Assessed Today: Yes  Diabetes Risks: Age over 45 years  CAD Risks: Diabetes Mellitus  Has dilated eye exam at least once a year?: Yes  Sees dentist every 6 months?: (Has dentures)  Sees podiatrist (foot doctor)?: No    Healthy Coping  Healthy Coping Assessed Today: Yes  Emotional response to diabetes: Ready to learn, Acceptance  Stage of change: ACTION (Actively working towards change)  Difficulty affording diabetes management supplies?: No  Support resources: None  Patient Activation Measure Survey Score:  WILFRID Score (Last Two) 6/13/2019   WILFRID Raw Score 30   Activation Score 56   WILFRID Level 3     ASSESSMENT:  Pt reports glucose levels well controlled in the past until she developed PMR and was placed on Prednisone therapy.  She has been on Prednisone for about one week and has taken NPH insulin 3x.  Pt has been waiting to take it until levels are high.  She takes it at 1-1:30 pm if levels are in the 300's.  Discussion notes that she does make healthy food choices for the most part and is very active doing all her own yard work and household chores.      Patient's most recent   Lab Results   Component Value Date    A1C 6.9 06/11/2019    is meeting goal of <7.0    INTERVENTION:   Diabetes knowledge and skills assessment:     Patient is knowledgeable in diabetes management concepts related to: Healthy Eating, Begin Active, Monitoring.     Patient needs further education on the following diabetes management concepts: Taking Medication, Problem Solving and  Reducing Risks    Based on learning assessment above, most appropriate setting for further diabetes education would be: Individual setting.    Education provided today on:  AADE Self-Care Behaviors:  Monitoring: individual blood glucose targets and frequency of monitoring  Taking Medication: action of prescribed medication, drawing up, administering and storing injectable diabetes medications, proper site selection and rotation for injections and when to take medications.  A NPH pen was provided today.   Problem Solving: high blood glucose - causes, signs/symptoms, treatment and prevention and low blood glucose - causes, signs/symptoms, treatment and prevention  Reducing Risks: major complications of diabetes and A1C - goals, relating to blood glucose levels, how often to check    Opportunities for ongoing education and support in diabetes-self management were discussed.    Pt verbalized understanding of concepts discussed and recommendations provided today.       Education Materials Provided:  My insulin plan.     PLAN:  Pt will continue to limit foods high in carbohydrates.  Pt will continue to try to get some exercise daily.  Test glucose 3x/day - fasting and either before meals or 2 hours after a meal.   Reduce NPH to 8 units since Prednisone dose is now 30 mg daily.  Take NPH before breakfast meal daily.   Next week when Prednisone dose decreases to 20 mg daily reduce NPH to 4 units before breakfast.    Call with any concerns.   See Patient Instructions for co-developed, patient-stated behavior change goals.  AVS printed and provided to patient today.   Follow up with provider on Sept. 9th.     Time Spent: 60 minutes  Encounter Type: Individual    Any diabetes medication dose changes were made via the CDE Protocol and Collaborative Practice Agreement with the patient's referring provider. A copy of this encounter was shared with the provider.        Sincerely,        Ilene Kumar RD

## 2019-08-26 NOTE — PROGRESS NOTES
"Diabetes Self-Management Education & Support    Diabetes Education Self Management & Training    SUBJECTIVE/OBJECTIVE:  Presents for: Individual review  Accompanied by: Self  Diabetes education in the past 24mo: No  Focus of Visit: Taking Medication, Assistance w/ making life changes  Diabetes type: Type 2  Date of diagnosis: Approximately 6 years ago.   Disease course: Worsening(Glucose levels increased r/t Prednisone)  How confident are you filling out medical forms by yourself:: Quite a bit  Diabetes management related comments/concerns: Elevated glucose levels r/t Prednisone  Transportation concerns: No  Other concerns:: None  Cultural Influences/Ethnic Background:  American    Diabetes Symptoms & Complications  Blurred vision: No  Fatigue: No  Neuropathy: Yes(Occasionally tingling in feet)  Foot ulcerations: No  Polydipsia: No  Polyphagia: No  Polyuria: No  Visual change: No  Weakness: No  Weight loss: No  Slow healing wounds: No  Recent Infection(s): No  Other: Yes(Recent dx of PMR. )  Symptom course: Stable  Weight trend: Stable  Autonomic neuropathy: No  CVA: No  Heart disease: No  Nephropathy: No  Peripheral neuropathy: No  Peripheral Vascular Disease: No  Retinopathy: No    Patient Problem List and Family Medical History reviewed for relevant medical history, current medical status, and diabetes risk factors.    Vitals:  /69   Pulse 73   Resp 16   Ht 1.645 m (5' 4.75\")   Wt 73.8 kg (162 lb 11.2 oz)   SpO2 98%   BMI 27.28 kg/m    Estimated body mass index is 27.28 kg/m  as calculated from the following:    Height as of this encounter: 1.645 m (5' 4.75\").    Weight as of this encounter: 73.8 kg (162 lb 11.2 oz).   Last 3 BP:   BP Readings from Last 3 Encounters:   08/23/19 150/69   08/22/19 (!) 154/64   08/16/19 (!) 162/78       History   Smoking Status     Never Smoker   Smokeless Tobacco     Never Used     Comment: yes passive exposure       Labs:  Lab Results   Component Value Date    A1C 6.9 " 06/11/2019     Lab Results   Component Value Date     08/16/2019     Lab Results   Component Value Date     06/11/2019     HDL Cholesterol   Date Value Ref Range Status   06/11/2019 63 >49 mg/dL Final   ]  GFR Estimate   Date Value Ref Range Status   08/16/2019 55 (L) >60 mL/min/[1.73_m2] Final     Comment:     Non  GFR Calc  Starting 12/18/2018, serum creatinine based estimated GFR (eGFR) will be   calculated using the Chronic Kidney Disease Epidemiology Collaboration   (CKD-EPI) equation.       GFR Estimate If Black   Date Value Ref Range Status   08/16/2019 64 >60 mL/min/[1.73_m2] Final     Comment:      GFR Calc  Starting 12/18/2018, serum creatinine based estimated GFR (eGFR) will be   calculated using the Chronic Kidney Disease Epidemiology Collaboration   (CKD-EPI) equation.       Lab Results   Component Value Date    CR 0.98 08/16/2019     No results found for: MICROALBUMIN    Healthy Eating  Healthy Eating Assessed Today: Yes  Cultural/Hoahaoism diet restrictions?: No  Patient on a regular basis: Eats 3 meals a day    Being Active  Being Active Assessed Today: Yes    Monitoring  Monitoring Assessed Today: Yes  Did patient bring glucose meter to appointment? : No  Blood Glucose Meter: Accu-check  Home Glucose (Sugar) Monitoring: 3-4 times per day  Pt reports fasting levels 105-115.  She then takes Prednisone and levels at 1-1:30 pm are high 200's to mid 300's.  Evening levels are around 250.     Taking Medications  Diabetes Medication(s)     Biguanides       metFORMIN (GLUCOPHAGE-XR) 500 MG 24 hr tablet    TAKE 2 TABLETS BY MOUTH TWICE DAILY WITH MEALS    Insulin       insulin isophane human (HUMULIN N PEN) 100 UNIT/ML injection    Inject 12 Units Subcutaneous daily        Taking Medication Assessed Today: Yes  Current Treatments: Diet, Oral Agent (monotherapy), Insulin Injections(Metformin XR 1000 mg at supper - increased to 1000 mg bid with Prednisone.  Pt also  has NPH pen now for high levels on Prednisone - has taken 12 units x3. )  Given by: Patient  Injection/Infusion sites: Abdomen  Problems taking diabetes medications regularly?: No  Diabetes medication side effects?: No  Treatment Compliance: All of the time    Problem Solving  Problem Solving Assessed Today: Yes  Hypoglycemia Frequency: Never    Reducing Risks  Reducing Risks Assessed Today: Yes  Diabetes Risks: Age over 45 years  CAD Risks: Diabetes Mellitus  Has dilated eye exam at least once a year?: Yes  Sees dentist every 6 months?: (Has dentures)  Sees podiatrist (foot doctor)?: No    Healthy Coping  Healthy Coping Assessed Today: Yes  Emotional response to diabetes: Ready to learn, Acceptance  Stage of change: ACTION (Actively working towards change)  Difficulty affording diabetes management supplies?: No  Support resources: None  Patient Activation Measure Survey Score:  WILFRID Score (Last Two) 6/13/2019   WILFRID Raw Score 30   Activation Score 56   WILFRID Level 3     ASSESSMENT:  Pt reports glucose levels well controlled in the past until she developed PMR and was placed on Prednisone therapy.  She has been on Prednisone for about one week and has taken NPH insulin 3x.  Pt has been waiting to take it until levels are high.  She takes it at 1-1:30 pm if levels are in the 300's.  Discussion notes that she does make healthy food choices for the most part and is very active doing all her own yard work and household chores.      Patient's most recent   Lab Results   Component Value Date    A1C 6.9 06/11/2019    is meeting goal of <7.0    INTERVENTION:   Diabetes knowledge and skills assessment:     Patient is knowledgeable in diabetes management concepts related to: Healthy Eating, Begin Active, Monitoring.     Patient needs further education on the following diabetes management concepts: Taking Medication, Problem Solving and Reducing Risks    Based on learning assessment above, most appropriate setting for further  diabetes education would be: Individual setting.    Education provided today on:  AADE Self-Care Behaviors:  Monitoring: individual blood glucose targets and frequency of monitoring  Taking Medication: action of prescribed medication, drawing up, administering and storing injectable diabetes medications, proper site selection and rotation for injections and when to take medications.  A NPH pen was provided today.   Problem Solving: high blood glucose - causes, signs/symptoms, treatment and prevention and low blood glucose - causes, signs/symptoms, treatment and prevention  Reducing Risks: major complications of diabetes and A1C - goals, relating to blood glucose levels, how often to check    Opportunities for ongoing education and support in diabetes-self management were discussed.    Pt verbalized understanding of concepts discussed and recommendations provided today.       Education Materials Provided:  My insulin plan.     PLAN:  Pt will continue to limit foods high in carbohydrates.  Pt will continue to try to get some exercise daily.  Test glucose 3x/day - fasting and either before meals or 2 hours after a meal.   Reduce NPH to 8 units since Prednisone dose is now 30 mg daily.  Take NPH before breakfast meal daily.   Next week when Prednisone dose decreases to 20 mg daily reduce NPH to 4 units before breakfast.    Call with any concerns.   See Patient Instructions for co-developed, patient-stated behavior change goals.  AVS printed and provided to patient today.   Follow up with provider on Sept. 9th.     Time Spent: 60 minutes  Encounter Type: Individual    Any diabetes medication dose changes were made via the CDE Protocol and Collaborative Practice Agreement with the patient's referring provider. A copy of this encounter was shared with the provider.

## 2019-09-06 NOTE — PROGRESS NOTES
Subjective     Ena Lanza is a 79 year old female who presents to clinic today for the following health issues:    HPI   Musculoskeletal problem/pain      Duration: f/u from 8-22-19    Description  Location: back of both arms     Intensity:  mild    Accompanying signs and symptoms: none    History  Previous similar problem: YES- PMR  Previous evaluation:  none    Precipitating or alleviating factors:  Trauma or overuse: YES  Aggravating factors include: lifting, exercise and overuse    Therapies tried and outcome: Prednisone effective        Patient Active Problem List   Diagnosis     Lesion of sciatic nerve     Hyperlipidemia with target LDL less than 100     HTN (hypertension)     DDD (degenerative disc disease), lumbar     Essential hypertension     Type 2 diabetes mellitus without complication (H)     ACP (advance care planning)     Newly recognized heart murmur     Essential hypertension with goal blood pressure less than 140/90     Herpes zoster with complication     Type 2 diabetes mellitus with hyperglycemia, without long-term current use of insulin (H)     Heart murmur     Aortic valve sclerosis     No past surgical history on file.    Social History     Tobacco Use     Smoking status: Never Smoker     Smokeless tobacco: Never Used     Tobacco comment: yes passive exposure   Substance Use Topics     Alcohol use: No     Alcohol/week: 0.0 oz     Family History   Problem Relation Age of Onset     C.A.D. Father 71        cause of death     Cancer Father         melanoma     Coronary Artery Disease Father      Thyroid Disease Mother      Hypertension Mother      Thyroid Disease Sister      Other Cancer Sister         melanoma     Diabetes No family hx of      Hyperlipidemia No family hx of      Asthma No family hx of      Breast Cancer No family hx of      Colon Cancer No family hx of          Current Outpatient Medications   Medication Sig Dispense Refill     aspirin 81 MG chewable tablet Take 1 tablet (81  mg) by mouth daily 108 tablet 3     blood glucose (ACCU-CHEK SMARTVIEW) test strip Use to test blood sugar 2 times daily or as directed 100 each 3     blood glucose monitoring (ACCU-CHEK FASTCLIX) lancets Use to test blood sugar 2 times daily. 200 each 0     blood glucose monitoring (ACCU-CHEK CINTHIA SMARTVIEW) meter device kit Use to test blood sugar 2 times daily or as directed. 1 kit 0     blood glucose monitoring (NO BRAND SPECIFIED) meter device kit Use to test blood sugar 3 times daily or as directed. Starting with starting of prednisone. 1 kit 1     insulin isophane human (HUMULIN N PEN) 100 UNIT/ML injection Inject 12 Units Subcutaneous daily 3 mL 3     lisinopril (PRINIVIL/ZESTRIL) 20 MG tablet Take 1.5 tablets (30 mg) by mouth daily 90 tablet 11     metFORMIN (GLUCOPHAGE-XR) 500 MG 24 hr tablet TAKE 2 TABLETS BY MOUTH TWICE DAILY WITH MEALS 360 tablet 3     predniSONE (DELTASONE) 20 MG tablet Take 3 pills by mouth today.  Then take 2 pills by mouth for one week. Following taper discussed. 60 tablet 0     predniSONE (DELTASONE) 5 MG tablet 3 tablets a day for 2 weeks, then go to 2 pills for 2 weeks. 70 tablet 1     STATIN NOT PRESCRIBED, INTENTIONAL, Statin not prescribed intentionally due to Intolerance (with supporting documentation of trying a statin at least once within the last 5 years) 0 each 0     triamcinolone (KENALOG) 0.025 % external ointment Apply at bedtime M, W, and Friday 80 g 3     No Known Allergies  Recent Labs   Lab Test 08/16/19  1407 06/11/19  1025 09/18/18  0922 08/21/17  0844  01/19/17  0916 06/13/16  1050 10/21/15  1008   A1C  --  6.9* 6.5* 5.8  --  6.8* 6.7* 6.4*   LDL  --  153* 138*  --   --  107* 110* 143*   HDL  --  63 75  --   --  69 67 73   TRIG  --  170* 201*  --   --  168* 202* 199*   ALT  --  20  --   --   --   --  18 27   CR 0.98 0.78 0.79  --    < > 0.74 0.70 0.65   GFRESTIMATED 55* 73 70  --    < > 77 81 89   GFRESTBLACK 64 84 85  --    < > >90   GFR Calc    >90   GFR Calc   >90   POTASSIUM 4.1 4.5 4.4  --    < > 3.5 3.6 3.9   TSH  --  1.14  --   --   --   --  1.03  --     < > = values in this interval not displayed.      BP Readings from Last 3 Encounters:   09/09/19 136/58   08/23/19 150/69   08/22/19 (!) 154/64    Wt Readings from Last 3 Encounters:   09/09/19 73.9 kg (163 lb)   08/23/19 73.8 kg (162 lb 11.2 oz)   08/22/19 73.9 kg (163 lb)                      Reviewed and updated as needed this visit by Provider         Review of Systems   ROS COMP: Constitutional, HEENT, cardiovascular, pulmonary, gi and gu systems are negative, except as otherwise noted.      Objective    There were no vitals taken for this visit.  There is no height or weight on file to calculate BMI.  Physical Exam   GENERAL: healthy, alert and no distress  NECK: no adenopathy, no asymmetry, masses, or scars and thyroid normal to palpation  RESP: lungs clear to auscultation - no rales, rhonchi or wheezes  CV: regular rate and rhythm, normal S1 S2, no S3 or S4, no murmur, click or rub, no peripheral edema and peripheral pulses strong  ABDOMEN: soft, nontender, no hepatosplenomegaly, no masses and bowel sounds normal  MS: no gross musculoskeletal defects noted, no edema    Diagnostic Test Results:  Labs reviewed in Epic     Results for orders placed or performed in visit on 09/09/19   ESR: Erythrocyte sedimentation rate   Result Value Ref Range    Sed Rate 11 0 - 30 mm/h       Assessment & Plan     1. PMR (polymyalgia rheumatica) (H)  Currently on 20 mg prednisone.  Her Sed Rates are now normal. Her pain is resolved. We are going to taper to 10 over the next month. Then we will start to go down by a mg per month.  This next follow up we will start on 9 mg .  If at any point pain returns she will call and we will recheck.     - predniSONE (DELTASONE) 5 MG tablet; 3 tablets a day for 2 weeks, then go to 2 pills for 2 weeks.  Dispense: 70 tablet; Refill: 1     BMI:   Estimated body  "mass index is 27.33 kg/m  as calculated from the following:    Height as of this encounter: 1.645 m (5' 4.75\").    Weight as of this encounter: 73.9 kg (163 lb).   Weight management plan: Discussed healthy diet and exercise guidelines        See Patient Instructions    No follow-ups on file.    Amanda Luu PA-C  [facility] - [place]      "

## 2019-09-09 ENCOUNTER — OFFICE VISIT (OUTPATIENT)
Dept: FAMILY MEDICINE | Facility: OTHER | Age: 79
End: 2019-09-09
Attending: PHYSICIAN ASSISTANT
Payer: MEDICARE

## 2019-09-09 VITALS
TEMPERATURE: 97.8 F | HEIGHT: 65 IN | OXYGEN SATURATION: 97 % | DIASTOLIC BLOOD PRESSURE: 58 MMHG | BODY MASS INDEX: 27.16 KG/M2 | SYSTOLIC BLOOD PRESSURE: 136 MMHG | HEART RATE: 88 BPM | WEIGHT: 163 LBS

## 2019-09-09 DIAGNOSIS — M35.3 PMR (POLYMYALGIA RHEUMATICA) (H): ICD-10-CM

## 2019-09-09 DIAGNOSIS — M35.3 PMR (POLYMYALGIA RHEUMATICA) (H): Primary | ICD-10-CM

## 2019-09-09 LAB — ERYTHROCYTE [SEDIMENTATION RATE] IN BLOOD BY WESTERGREN METHOD: 11 MM/H (ref 0–30)

## 2019-09-09 PROCEDURE — G0463 HOSPITAL OUTPT CLINIC VISIT: HCPCS

## 2019-09-09 PROCEDURE — 36415 COLL VENOUS BLD VENIPUNCTURE: CPT | Mod: ZL | Performed by: PHYSICIAN ASSISTANT

## 2019-09-09 PROCEDURE — G0463 HOSPITAL OUTPT CLINIC VISIT: HCPCS | Mod: 25

## 2019-09-09 PROCEDURE — 85652 RBC SED RATE AUTOMATED: CPT | Mod: ZL | Performed by: PHYSICIAN ASSISTANT

## 2019-09-09 PROCEDURE — 99213 OFFICE O/P EST LOW 20 MIN: CPT | Performed by: PHYSICIAN ASSISTANT

## 2019-09-09 RX ORDER — PREDNISONE 5 MG/1
TABLET ORAL
Qty: 70 TABLET | Refills: 1 | Status: SHIPPED | OUTPATIENT
Start: 2019-09-09 | End: 2020-04-22

## 2019-09-09 ASSESSMENT — PAIN SCALES - GENERAL: PAINLEVEL: NO PAIN (0)

## 2019-09-09 ASSESSMENT — MIFFLIN-ST. JEOR: SCORE: 1216.27

## 2019-09-09 NOTE — NURSING NOTE
"Chief Complaint   Patient presents with     Musculoskeletal Problem     PMR       Initial /58 (BP Location: Right arm, Patient Position: Sitting, Cuff Size: Adult Regular)   Pulse 88   Temp 97.8  F (36.6  C) (Tympanic)   Ht 1.645 m (5' 4.75\")   Wt 73.9 kg (163 lb)   SpO2 97%   BMI 27.33 kg/m   Estimated body mass index is 27.33 kg/m  as calculated from the following:    Height as of this encounter: 1.645 m (5' 4.75\").    Weight as of this encounter: 73.9 kg (163 lb).  Medication Reconciliation: complete  "

## 2019-09-09 NOTE — PATIENT INSTRUCTIONS
Thank you for choosing [facility].   I have office hours 8:00 am to 4:30 pm on Monday's, Wednesday's, Thursday's and Friday's. My nurse and I are out of the office every Tuesday.    Following your visit, when your labs and diagnostic testing have returned, I will review then and you will be contacted by my nurse.  If you are on My Chart, you can also view results there.    For refills, notify your pharmacy regarding what you need and the pharmacy will generate a refill request. Do not call my nurse as she is unable to process refill request. Please plan ahead and allow 3-5 days for refill requests.    You will generally receive a reminder call the day prior to your appointment.  If you cannot attend your appointment, please cancel your appointment with as much notice as possible.  If there is a pattern of failure to present for your appointments, I cannot provide consistent, meaningful, ongoing care for you. It is very important to me that you come in for your care, so we can best assist you with your health care needs.    IMPORTANT:  Please note that it is my standard of practice to NOT participate in prescribing ongoing requested Narcotic Analgesic therapy, and/or participate in the prescribing of other controlled substances.  My nurse and I am happy to assist you with the process of referral for alternative pain management as needed, and other treatment modalities including but not limited to:  Physical Therapy, Physical Medicine and Rehab, Counseling, Chiropractic Care, Orthopedic Care, and non-narcotic medication management.     In the event that you need to be seen for emergent concerns and I am out of office,  please see one of my colleagues for acute concerns.  You may also present to  or ER.  I appreciate the opportunity to serve you and look forward to supporting your healthcare needs in the future. Please contact me with any questions or concerns that you may  have.    Sincerely,      Amanda Luu RN, PA-C

## 2019-09-13 DIAGNOSIS — E11.65 TYPE 2 DIABETES MELLITUS WITH HYPERGLYCEMIA, WITHOUT LONG-TERM CURRENT USE OF INSULIN (H): ICD-10-CM

## 2019-09-13 DIAGNOSIS — E11.65 TYPE 2 DIABETES MELLITUS WITH HYPERGLYCEMIA, WITH LONG-TERM CURRENT USE OF INSULIN (H): ICD-10-CM

## 2019-09-13 DIAGNOSIS — Z79.4 TYPE 2 DIABETES MELLITUS WITH HYPERGLYCEMIA, WITH LONG-TERM CURRENT USE OF INSULIN (H): ICD-10-CM

## 2019-09-13 RX ORDER — LANCETS
EACH MISCELLANEOUS
Qty: 200 EACH | Refills: 0 | Status: SHIPPED | OUTPATIENT
Start: 2019-09-13

## 2019-09-13 NOTE — TELEPHONE ENCOUNTER
Patient called stating she needs refill on test strips and pen and needles by today, [facility].   Cata Florence LPN    blood glucose (ACCU-CHEK SMARTVIEW) test strip, blood glucose monitoring (ACCU-CHEK FASTCLIX) lancets , insulin isophane human (HUMULIN N PEN) 100 UNIT/ML injection    Last Written Prescription Date:  8/16/2019 and 8/20/2019  Last Fill Quantity: Lancets 200, test strips 100, Pen 3   Last Office Visit: 9/9/2019  Future Office visit:    Next 5 appointments (look out 90 days)    Oct 10, 2019  2:00 PM CDT  (Arrive by 1:45 PM)  [facility] [place] [facility] ) [address]  [place] [address]  [phone]   Oct 15, 2019  3:15 PM CDT  (Arrive by 3:00 PM)  [facility] ([facility] [address] [place] [id number] [phone]

## 2019-09-18 ENCOUNTER — TELEPHONE (OUTPATIENT)
Dept: FAMILY MEDICINE | Facility: OTHER | Age: 79
End: 2019-09-18

## 2019-09-18 NOTE — TELEPHONE ENCOUNTER
Received PA request from [facility] for Humulin N U-100 KwikPen. Insurance asking why the formulary alternative Novolin N NPH U-100 would be ineffective for patient. Please advise with reasoning why patient cannot use the preferred alternative Novolin so I may include documentation with PA request, or this request for Humulin N will most likely be denied. Thank you!

## 2019-10-07 ENCOUNTER — TELEPHONE (OUTPATIENT)
Dept: FAMILY MEDICINE | Facility: OTHER | Age: 79
End: 2019-10-07

## 2019-10-07 NOTE — TELEPHONE ENCOUNTER
8:30 AM    Reason for Call: OVERBOOK    Patient is having the following symptoms: Ena needed to cancel the appointment on the  victoriano to a  , the next available is the  but she doesn't think she can wait that long. Can she be scheduled nest week some time ? Please call Ena to advise. She apologizesfor any inconvenience.    The patient is requesting an appointment for Next Week with Amanda Luu    Preferred method for responding to this message: [phone]    If we cannot reach you directly, may we leave a detailed response at the number you provided?  Yes        Merle Sykes

## 2019-10-08 NOTE — PROGRESS NOTES
Subjective     Ena Lanza is a 79 year old female who presents to clinic today for the following health issues:    HPI   Musculoskeletal problem/pain      Duration: FOLLOW UP    Description  Location: back of both arms    Intensity:  moderate    Accompanying signs and symptoms: none    History  Previous similar problem: no   Previous evaluation:  none    Precipitating or alleviating factors:  Trauma or overuse: no   Aggravating factors include: none    Therapies tried and outcome: nothing        Patient Active Problem List   Diagnosis     Lesion of sciatic nerve     Hyperlipidemia with target LDL less than 100     HTN (hypertension)     DDD (degenerative disc disease), lumbar     Essential hypertension     Type 2 diabetes mellitus without complication (H)     ACP (advance care planning)     Newly recognized heart murmur     Essential hypertension with goal blood pressure less than 140/90     Herpes zoster with complication     Type 2 diabetes mellitus with hyperglycemia, without long-term current use of insulin (H)     Heart murmur     Aortic valve sclerosis     History reviewed. No pertinent surgical history.    Social History     Tobacco Use     Smoking status: Never Smoker     Smokeless tobacco: Never Used     Tobacco comment: yes passive exposure   Substance Use Topics     Alcohol use: No     Alcohol/week: 0.0 standard drinks     Family History   Problem Relation Age of Onset     C.A.D. Father 71        cause of death     Cancer Father         melanoma     Coronary Artery Disease Father      Thyroid Disease Mother      Hypertension Mother      Thyroid Disease Sister      Other Cancer Sister         melanoma     Diabetes No family hx of      Hyperlipidemia No family hx of      Asthma No family hx of      Breast Cancer No family hx of      Colon Cancer No family hx of          Current Outpatient Medications   Medication Sig Dispense Refill     aspirin 81 MG chewable tablet Take 1 tablet (81 mg) by mouth daily  108 tablet 3     blood glucose (ACCU-CHEK SMARTVIEW) test strip Use to test blood sugar 2 times daily or as directed 100 each 3     blood glucose monitoring (ACCU-CHEK FASTCLIX) lancets Use to test blood sugar 2 times daily. 200 each 0     blood glucose monitoring (ACCU-CHEK CINTHIA SMARTVIEW) meter device kit Use to test blood sugar 2 times daily or as directed. 1 kit 0     blood glucose monitoring (NO BRAND SPECIFIED) meter device kit Use to test blood sugar 3 times daily or as directed. Starting with starting of prednisone. 1 kit 1     insulin isophane human (HUMULIN N PEN) 100 UNIT/ML injection Inject 12 Units Subcutaneous daily 3 mL 3     lisinopril (PRINIVIL/ZESTRIL) 20 MG tablet Take 1.5 tablets (30 mg) by mouth daily 90 tablet 11     metFORMIN (GLUCOPHAGE-XR) 500 MG 24 hr tablet TAKE 2 TABLETS BY MOUTH TWICE DAILY WITH MEALS 360 tablet 3     predniSONE (DELTASONE) 20 MG tablet Take 3 pills by mouth today.  Then take 2 pills by mouth for one week. Following taper discussed. 60 tablet 0     predniSONE (DELTASONE) 5 MG tablet 3 tablets a day for 2 weeks, then go to 2 pills for 2 weeks. 70 tablet 1     STATIN NOT PRESCRIBED, INTENTIONAL, Statin not prescribed intentionally due to Intolerance (with supporting documentation of trying a statin at least once within the last 5 years) 0 each 0     triamcinolone (KENALOG) 0.025 % external ointment Apply at bedtime M, W, and Friday 80 g 3     No Known Allergies  Recent Labs   Lab Test 08/16/19  1407 06/11/19  1025 09/18/18  0922 08/21/17  0844  01/19/17  0916 06/13/16  1050 10/21/15  1008   A1C  --  6.9* 6.5* 5.8  --  6.8* 6.7* 6.4*   LDL  --  153* 138*  --   --  107* 110* 143*   HDL  --  63 75  --   --  69 67 73   TRIG  --  170* 201*  --   --  168* 202* 199*   ALT  --  20  --   --   --   --  18 27   CR 0.98 0.78 0.79  --    < > 0.74 0.70 0.65   GFRESTIMATED 55* 73 70  --    < > 77 81 89   GFRESTBLACK 64 84 85  --    < > >90   GFR Calc   >90    American GFR Calc   >90   POTASSIUM 4.1 4.5 4.4  --    < > 3.5 3.6 3.9   TSH  --  1.14  --   --   --   --  1.03  --     < > = values in this interval not displayed.      BP Readings from Last 3 Encounters:   10/14/19 (!) 142/88   09/09/19 136/58   08/23/19 150/69    Wt Readings from Last 3 Encounters:   10/14/19 73.5 kg (162 lb)   09/09/19 73.9 kg (163 lb)   08/23/19 73.8 kg (162 lb 11.2 oz)                      Reviewed and updated as needed this visit by Provider         Review of Systems   ROS COMP: Constitutional, HEENT, cardiovascular, pulmonary, gi and gu systems are negative, except as otherwise noted.      Objective    There were no vitals taken for this visit.  There is no height or weight on file to calculate BMI.  Physical Exam   GENERAL: healthy, alert and no distress  EYES: Eyes grossly normal to inspection, PERRL and conjunctivae and sclerae normal  HENT: ear canals and TM's normal, nose and mouth without ulcers or lesions  NECK: no adenopathy, no asymmetry, masses, or scars and thyroid normal to palpation  RESP: lungs clear to auscultation - no rales, rhonchi or wheezes  CV: regular rate and rhythm, normal S1 S2, no S3 or S4, no murmur, click or rub, no peripheral edema and peripheral pulses strong  ABDOMEN: soft, nontender, no hepatosplenomegaly, no masses and bowel sounds normal  MS: no gross musculoskeletal defects noted, no edema    Diagnostic Test Results:  Labs reviewed in Epic  Results for orders placed or performed in visit on 10/14/19 (from the past 24 hour(s))   ESR: Erythrocyte sedimentation rate   Result Value Ref Range    Sed Rate 19 0 - 30 mm/h           Assessment & Plan     1. PMR (polymyalgia rheumatica) (H)  She is on 10 mg and will then go to 7.5 mg and then taper down even further to 5 mg after 2 weeks. From that point on we will be doing a mg per month. 5, 4, 3, 2, 1.  Goal to be done with this by March of 2020.  She has done extremely well on her weight and her sx are almost all  "gone including a rash on her hands.   - ESR: Erythrocyte sedimentation rate; Future     BMI:   Estimated body mass index is 27.17 kg/m  as calculated from the following:    Height as of this encounter: 1.645 m (5' 4.75\").    Weight as of this encounter: 73.5 kg (162 lb).   Weight management plan: Discussed healthy diet and exercise guidelines        See Patient Instructions    No follow-ups on file.    EFREN Troy  [facility] - [place]      "

## 2019-10-14 ENCOUNTER — OFFICE VISIT (OUTPATIENT)
Dept: FAMILY MEDICINE | Facility: OTHER | Age: 79
End: 2019-10-14
Attending: PHYSICIAN ASSISTANT
Payer: MEDICARE

## 2019-10-14 VITALS
WEIGHT: 162 LBS | HEIGHT: 65 IN | OXYGEN SATURATION: 96 % | SYSTOLIC BLOOD PRESSURE: 142 MMHG | BODY MASS INDEX: 26.99 KG/M2 | DIASTOLIC BLOOD PRESSURE: 88 MMHG | HEART RATE: 88 BPM | TEMPERATURE: 97.1 F

## 2019-10-14 DIAGNOSIS — M35.3 PMR (POLYMYALGIA RHEUMATICA) (H): ICD-10-CM

## 2019-10-14 DIAGNOSIS — M35.3 PMR (POLYMYALGIA RHEUMATICA) (H): Primary | ICD-10-CM

## 2019-10-14 LAB — ERYTHROCYTE [SEDIMENTATION RATE] IN BLOOD BY WESTERGREN METHOD: 19 MM/H (ref 0–30)

## 2019-10-14 PROCEDURE — G0463 HOSPITAL OUTPT CLINIC VISIT: HCPCS

## 2019-10-14 PROCEDURE — 85652 RBC SED RATE AUTOMATED: CPT | Mod: ZL | Performed by: PHYSICIAN ASSISTANT

## 2019-10-14 PROCEDURE — 36415 COLL VENOUS BLD VENIPUNCTURE: CPT | Mod: ZL | Performed by: PHYSICIAN ASSISTANT

## 2019-10-14 PROCEDURE — 99213 OFFICE O/P EST LOW 20 MIN: CPT | Performed by: PHYSICIAN ASSISTANT

## 2019-10-14 ASSESSMENT — MIFFLIN-ST. JEOR: SCORE: 1211.74

## 2019-10-14 ASSESSMENT — PAIN SCALES - GENERAL: PAINLEVEL: NO PAIN (0)

## 2019-10-17 PROCEDURE — G0463 HOSPITAL OUTPT CLINIC VISIT: HCPCS

## 2019-10-31 DIAGNOSIS — E11.65 TYPE 2 DIABETES MELLITUS WITH HYPERGLYCEMIA, WITHOUT LONG-TERM CURRENT USE OF INSULIN (H): ICD-10-CM

## 2019-10-31 RX ORDER — METFORMIN HCL 500 MG
TABLET, EXTENDED RELEASE 24 HR ORAL
Qty: 360 TABLET | Refills: 0 | Status: SHIPPED | OUTPATIENT
Start: 2019-10-31 | End: 2020-04-17

## 2019-11-04 ENCOUNTER — TRANSFERRED RECORDS (OUTPATIENT)
Dept: HEALTH INFORMATION MANAGEMENT | Facility: CLINIC | Age: 79
End: 2019-11-04

## 2019-11-12 DIAGNOSIS — M35.3 PMR (POLYMYALGIA RHEUMATICA) (H): ICD-10-CM

## 2019-11-12 LAB — ERYTHROCYTE [SEDIMENTATION RATE] IN BLOOD BY WESTERGREN METHOD: 19 MM/H (ref 0–30)

## 2019-11-12 PROCEDURE — 85652 RBC SED RATE AUTOMATED: CPT | Mod: ZL | Performed by: PHYSICIAN ASSISTANT

## 2019-11-12 PROCEDURE — 36415 COLL VENOUS BLD VENIPUNCTURE: CPT | Mod: ZL | Performed by: PHYSICIAN ASSISTANT

## 2019-12-02 DIAGNOSIS — M35.3 PMR (POLYMYALGIA RHEUMATICA) (H): Primary | ICD-10-CM

## 2019-12-02 RX ORDER — PREDNISONE 1 MG/1
4 TABLET ORAL DAILY
Qty: 120 TABLET | Refills: 0 | Status: SHIPPED | OUTPATIENT
Start: 2019-12-02 | End: 2020-01-23

## 2019-12-02 NOTE — TELEPHONE ENCOUNTER
Prednisone      Last Written Prescription Date:  -  Last Fill Quantity: -,   # refills: -  Last Office Visit: 10/14/19  Future Office visit:       Routing refill request to provider for review/approval because:  Drug not on the [facility], [facility] or [facility] [place] refill protocol or controlled substance    Pt called, states that she is supposed to taper her prednisone and was told to call when she needed a refill. Please advise. Thank you!    Per Amanda's note from 10/14/19- 1. PMR (polymyalgia rheumatica) (H)  She is on 10 mg and will then go to 7.5 mg and then taper down even further to 5 mg after 2 weeks. From that point on we will be doing a mg per month. 5, 4, 3, 2, 1.  Goal to be done with this by March of 2020.  She has done extremely well on her weight and her sx are almost all gone including a rash on her hands.   - ESR: Erythrocyte sedimentation rate; Future    4mg dose pended.

## 2019-12-03 ENCOUNTER — OFFICE VISIT (OUTPATIENT)
Dept: FAMILY MEDICINE | Facility: OTHER | Age: 79
End: 2019-12-03
Attending: FAMILY MEDICINE
Payer: MEDICARE

## 2019-12-03 VITALS
SYSTOLIC BLOOD PRESSURE: 128 MMHG | RESPIRATION RATE: 19 BRPM | OXYGEN SATURATION: 98 % | HEART RATE: 70 BPM | TEMPERATURE: 97.9 F | DIASTOLIC BLOOD PRESSURE: 80 MMHG | WEIGHT: 162 LBS | HEIGHT: 65 IN | BODY MASS INDEX: 26.99 KG/M2

## 2019-12-03 DIAGNOSIS — M26.609 TMJ (TEMPOROMANDIBULAR JOINT SYNDROME): Primary | ICD-10-CM

## 2019-12-03 DIAGNOSIS — M35.3 PMR (POLYMYALGIA RHEUMATICA) (H): ICD-10-CM

## 2019-12-03 DIAGNOSIS — Z23 NEED FOR PROPHYLACTIC VACCINATION AND INOCULATION AGAINST INFLUENZA: ICD-10-CM

## 2019-12-03 PROCEDURE — 90662 IIV NO PRSV INCREASED AG IM: CPT

## 2019-12-03 PROCEDURE — 90471 IMMUNIZATION ADMIN: CPT | Performed by: FAMILY MEDICINE

## 2019-12-03 PROCEDURE — G0463 HOSPITAL OUTPT CLINIC VISIT: HCPCS | Mod: 25

## 2019-12-03 PROCEDURE — 99213 OFFICE O/P EST LOW 20 MIN: CPT | Performed by: FAMILY MEDICINE

## 2019-12-03 PROCEDURE — G0463 HOSPITAL OUTPT CLINIC VISIT: HCPCS

## 2019-12-03 RX ORDER — IBUPROFEN 600 MG/1
600 TABLET, FILM COATED ORAL EVERY 6 HOURS PRN
Qty: 60 TABLET | Refills: 1 | Status: SHIPPED | OUTPATIENT
Start: 2019-12-03 | End: 2020-07-09

## 2019-12-03 ASSESSMENT — MIFFLIN-ST. JEOR: SCORE: 1211.74

## 2019-12-03 ASSESSMENT — PAIN SCALES - GENERAL: PAINLEVEL: MODERATE PAIN (4)

## 2019-12-03 NOTE — NURSING NOTE
"Chief Complaint   Patient presents with     Jaw Pain     Flu Shot       Initial /80   Pulse 70   Temp 97.9  F (36.6  C) (Tympanic)   Resp 19   Ht 1.645 m (5' 4.75\")   Wt 73.5 kg (162 lb)   SpO2 98%   BMI 27.17 kg/m   Estimated body mass index is 27.17 kg/m  as calculated from the following:    Height as of this encounter: 1.645 m (5' 4.75\").    Weight as of this encounter: 73.5 kg (162 lb).  Medication Reconciliation: complete  Sun Morales LPN    "

## 2019-12-03 NOTE — PROGRESS NOTES
Subjective     Ena Lanza is a 79 year old female who presents to clinic today for the following health issues:    HPI   Jaw pain      Duration: 6 days    Description (location/character/radiation): states does have left side jaw pain and radiates to her left ear. Pain comes and goes.    Intensity:  mild, 4/10    Accompanying signs and symptoms: throbbing pain off and on    History (similar episodes/previous evaluation): None    Precipitating or alleviating factors: None    Therapies tried and outcome: Tylenol prn     She is being treated for PMR with tapering prednisone, now down to 4 mg. She didn't have jaw claudication as a symptom initially with this. She tends to clench her teeth and has bitten through mouth guards. No vision change    Patient Active Problem List   Diagnosis     Lesion of sciatic nerve     Hyperlipidemia with target LDL less than 100     HTN (hypertension)     DDD (degenerative disc disease), lumbar     Essential hypertension     Type 2 diabetes mellitus without complication (H)     ACP (advance care planning)     Newly recognized heart murmur     Essential hypertension with goal blood pressure less than 140/90     Herpes zoster with complication     Type 2 diabetes mellitus with hyperglycemia, without long-term current use of insulin (H)     Heart murmur     Aortic valve sclerosis     PMR (polymyalgia rheumatica) (H)     TMJ (temporomandibular joint syndrome)     History reviewed. No pertinent surgical history.    Social History     Tobacco Use     Smoking status: Never Smoker     Smokeless tobacco: Never Used     Tobacco comment: yes passive exposure   Substance Use Topics     Alcohol use: No     Alcohol/week: 0.0 standard drinks     Family History   Problem Relation Age of Onset     C.A.D. Father 71        cause of death     Cancer Father         melanoma     Coronary Artery Disease Father      Thyroid Disease Mother      Hypertension Mother      Thyroid Disease Sister      Other Cancer  Sister         melanoma     Diabetes No family hx of      Hyperlipidemia No family hx of      Asthma No family hx of      Breast Cancer No family hx of      Colon Cancer No family hx of          Current Outpatient Medications   Medication Sig Dispense Refill     aspirin 81 MG chewable tablet Take 1 tablet (81 mg) by mouth daily 108 tablet 3     blood glucose (ACCU-CHEK SMARTVIEW) test strip Use to test blood sugar 2 times daily or as directed 100 each 3     blood glucose monitoring (ACCU-CHEK FASTCLIX) lancets Use to test blood sugar 2 times daily. 200 each 0     blood glucose monitoring (ACCU-CHEK CINTHIA SMARTVIEW) meter device kit Use to test blood sugar 2 times daily or as directed. 1 kit 0     blood glucose monitoring (NO BRAND SPECIFIED) meter device kit Use to test blood sugar 3 times daily or as directed. Starting with starting of prednisone. 1 kit 1     ibuprofen (ADVIL/MOTRIN) 600 MG tablet Take 1 tablet (600 mg) by mouth every 6 hours as needed for moderate pain 60 tablet 1     insulin isophane human (HUMULIN N PEN) 100 UNIT/ML injection Inject 12 Units Subcutaneous daily 3 mL 3     lisinopril (PRINIVIL/ZESTRIL) 20 MG tablet Take 1.5 tablets (30 mg) by mouth daily 90 tablet 11     metFORMIN (GLUCOPHAGE-XR) 500 MG 24 hr tablet TAKE 2 TABLETS BY MOUTH TWICE DAILY WITH MEALS 360 tablet 0     predniSONE (DELTASONE) 1 MG tablet Take 4 tablets (4 mg) by mouth daily 120 tablet 0     predniSONE (DELTASONE) 20 MG tablet Take 3 pills by mouth today.  Then take 2 pills by mouth for one week. Following taper discussed. 60 tablet 0     predniSONE (DELTASONE) 5 MG tablet 3 tablets a day for 2 weeks, then go to 2 pills for 2 weeks. 70 tablet 1     STATIN NOT PRESCRIBED, INTENTIONAL, Statin not prescribed intentionally due to Intolerance (with supporting documentation of trying a statin at least once within the last 5 years) 0 each 0     triamcinolone (KENALOG) 0.025 % external ointment Apply at bedtime M, W, and Friday 80  "g 3     No Known Allergies  BP Readings from Last 3 Encounters:   12/03/19 128/80   10/14/19 (!) 142/88   09/09/19 136/58    Wt Readings from Last 3 Encounters:   12/03/19 73.5 kg (162 lb)   10/14/19 73.5 kg (162 lb)   09/09/19 73.9 kg (163 lb)                      Reviewed and updated as needed this visit by Provider  Allergies  Meds  Problems  Med Hx  Surg Hx         Review of Systems   ROS COMP: Constitutional, HEENT, cardiovascular, pulmonary, gi and gu systems are negative, except as otherwise noted.      Objective    /80   Pulse 70   Temp 97.9  F (36.6  C) (Tympanic)   Resp 19   Ht 1.645 m (5' 4.75\")   Wt 73.5 kg (162 lb)   SpO2 98%   BMI 27.17 kg/m    Body mass index is 27.17 kg/m .  Physical Exam   GENERAL: healthy, alert and no distress  HENT: ear canals and TM's normal, nose and mouth without ulcers or lesions, dentures in place appear aligned  NECK: no adenopathy, no asymmetry, masses, or scars and thyroid normal to palpation  RESP: lungs clear to auscultation - no rales, rhonchi or wheezes  CV: regular rate and rhythm, normal S1 S2, no S3 or S4, no murmur, click or rub, no peripheral edema and peripheral pulses strong  MS: left TMJ joint is tender to palpation and with opening and closing of jaw  NEURO: Normal strength and tone, mentation intact and speech normal  PSYCH: mentation appears normal, affect normal/bright            Assessment & Plan     1. TMJ (temporomandibular joint syndrome)  Will treat with ibuprofen, take with food, follow up if not improving. She feels her dentures are aligned which would be another cause   - ibuprofen (ADVIL/MOTRIN) 600 MG tablet; Take 1 tablet (600 mg) by mouth every 6 hours as needed for moderate pain  Dispense: 60 tablet; Refill: 1    2. PMR (polymyalgia rheumatica) (H)  Tapering down to 4 mg of prednisone today    3. Need for prophylactic vaccination and inoculation against influenza  given  - INFLUENZA (HIGH DOSE) 3 VALENT VACCINE [33418]  - " "Vaccine Administration, Initial [51868]     BMI:   Estimated body mass index is 27.17 kg/m  as calculated from the following:    Height as of this encounter: 1.645 m (5' 4.75\").    Weight as of this encounter: 73.5 kg (162 lb).               Return if symptoms worsen or fail to improve.    Cindy Morales MD  [facility] [place]      "

## 2020-01-20 ENCOUNTER — TELEPHONE (OUTPATIENT)
Dept: FAMILY MEDICINE | Facility: OTHER | Age: 80
End: 2020-01-20

## 2020-01-20 DIAGNOSIS — M35.3 PMR (POLYMYALGIA RHEUMATICA) (H): Primary | ICD-10-CM

## 2020-01-20 DIAGNOSIS — M35.3 PMR (POLYMYALGIA RHEUMATICA) (H): ICD-10-CM

## 2020-01-20 LAB — ERYTHROCYTE [SEDIMENTATION RATE] IN BLOOD BY WESTERGREN METHOD: 20 MM/H (ref 0–30)

## 2020-01-20 PROCEDURE — 85652 RBC SED RATE AUTOMATED: CPT | Mod: ZL | Performed by: PHYSICIAN ASSISTANT

## 2020-01-20 PROCEDURE — 36415 COLL VENOUS BLD VENIPUNCTURE: CPT | Mod: ZL | Performed by: PHYSICIAN ASSISTANT

## 2020-01-22 DIAGNOSIS — I10 ESSENTIAL HYPERTENSION WITH GOAL BLOOD PRESSURE LESS THAN 140/90: ICD-10-CM

## 2020-01-22 RX ORDER — LISINOPRIL 20 MG/1
30 TABLET ORAL DAILY
Qty: 135 TABLET | Refills: 0 | Status: SHIPPED | OUTPATIENT
Start: 2020-01-22 | End: 2020-04-22

## 2020-01-22 NOTE — TELEPHONE ENCOUNTER
lisinopril (PRINIVIL/ZESTRIL) 20 MG tablet      Last Written Prescription Date:  12/13/2018  Last Fill Quantity: 90,   # refills: 11  Last Office Visit: 10/14/2019  Future Office visit:

## 2020-01-23 DIAGNOSIS — M35.3 PMR (POLYMYALGIA RHEUMATICA) (H): ICD-10-CM

## 2020-01-23 RX ORDER — PREDNISONE 1 MG/1
4 TABLET ORAL DAILY
Qty: 120 TABLET | Refills: 0 | Status: SHIPPED | OUTPATIENT
Start: 2020-01-23 | End: 2020-04-22

## 2020-03-09 ENCOUNTER — TELEPHONE (OUTPATIENT)
Dept: FAMILY MEDICINE | Facility: OTHER | Age: 80
End: 2020-03-09

## 2020-03-09 DIAGNOSIS — M35.3 PMR (POLYMYALGIA RHEUMATICA) (H): Primary | ICD-10-CM

## 2020-03-09 NOTE — TELEPHONE ENCOUNTER
Pt called, requests recheck of her sed rate so her prednisone can be adjusted. Per pt, she gets her sed rate checked approx every month. Pended standing order for sed rate. Please advise. Thank you!

## 2020-03-10 DIAGNOSIS — M35.3 PMR (POLYMYALGIA RHEUMATICA) (H): ICD-10-CM

## 2020-03-10 LAB — ERYTHROCYTE [SEDIMENTATION RATE] IN BLOOD BY WESTERGREN METHOD: 21 MM/H (ref 0–30)

## 2020-03-10 PROCEDURE — 85652 RBC SED RATE AUTOMATED: CPT | Mod: ZL | Performed by: PHYSICIAN ASSISTANT

## 2020-03-10 PROCEDURE — 36415 COLL VENOUS BLD VENIPUNCTURE: CPT | Mod: ZL | Performed by: PHYSICIAN ASSISTANT

## 2020-04-17 DIAGNOSIS — E11.65 TYPE 2 DIABETES MELLITUS WITH HYPERGLYCEMIA, WITHOUT LONG-TERM CURRENT USE OF INSULIN (H): ICD-10-CM

## 2020-04-17 RX ORDER — METFORMIN HCL 500 MG
TABLET, EXTENDED RELEASE 24 HR ORAL
Qty: 360 TABLET | Refills: 0 | Status: SHIPPED | OUTPATIENT
Start: 2020-04-17 | End: 2020-11-12

## 2020-04-17 NOTE — TELEPHONE ENCOUNTER
Failed due to a1c  Hemoglobin A1C   Date Value Ref Range Status   06/11/2019 6.9 (H) 0 - 5.6 % Final     Comment:     Normal <5.7% Prediabetes 5.7-6.4%  Diabetes 6.5% or higher - adopted from ADA   consensus guidelines.

## 2020-04-17 NOTE — TELEPHONE ENCOUNTER
metFORMIN (GLUCOPHAGE-XR) 500 MG 24 hr tablet      Last Written Prescription Date:  10/31/19  Last Fill Quantity: 360,   # refills: 0  Last Office Visit: 10/14/19  Future Office visit:       Routing refill request to provider for review/approval because:

## 2020-04-22 ENCOUNTER — VIRTUAL VISIT (OUTPATIENT)
Dept: FAMILY MEDICINE | Facility: OTHER | Age: 80
End: 2020-04-22
Attending: PHYSICIAN ASSISTANT
Payer: COMMERCIAL

## 2020-04-22 ENCOUNTER — TELEPHONE (OUTPATIENT)
Dept: FAMILY MEDICINE | Facility: OTHER | Age: 80
End: 2020-04-22

## 2020-04-22 VITALS — HEIGHT: 64 IN | WEIGHT: 162 LBS | BODY MASS INDEX: 27.66 KG/M2

## 2020-04-22 DIAGNOSIS — E11.65 TYPE 2 DIABETES MELLITUS WITH HYPERGLYCEMIA, WITHOUT LONG-TERM CURRENT USE OF INSULIN (H): ICD-10-CM

## 2020-04-22 DIAGNOSIS — I10 ESSENTIAL HYPERTENSION WITH GOAL BLOOD PRESSURE LESS THAN 140/90: ICD-10-CM

## 2020-04-22 DIAGNOSIS — E78.5 HYPERLIPIDEMIA WITH TARGET LDL LESS THAN 100: ICD-10-CM

## 2020-04-22 DIAGNOSIS — M35.3 PMR (POLYMYALGIA RHEUMATICA) (H): Primary | ICD-10-CM

## 2020-04-22 PROBLEM — R01.1 HEART MURMUR: Status: RESOLVED | Noted: 2017-02-20 | Resolved: 2020-04-22

## 2020-04-22 PROCEDURE — 99214 OFFICE O/P EST MOD 30 MIN: CPT | Mod: 95 | Performed by: PHYSICIAN ASSISTANT

## 2020-04-22 RX ORDER — LISINOPRIL 20 MG/1
30 TABLET ORAL DAILY
Qty: 135 TABLET | Refills: 0 | Status: SHIPPED | OUTPATIENT
Start: 2020-04-22 | End: 2020-07-29

## 2020-04-22 RX ORDER — PREDNISONE 5 MG/1
TABLET ORAL
Qty: 90 TABLET | Refills: 3 | Status: SHIPPED | OUTPATIENT
Start: 2020-04-22 | End: 2020-07-09

## 2020-04-22 ASSESSMENT — PAIN SCALES - GENERAL: PAINLEVEL: MODERATE PAIN (4)

## 2020-04-22 ASSESSMENT — MIFFLIN-ST. JEOR: SCORE: 1199.83

## 2020-04-22 NOTE — NURSING NOTE
"Chief Complaint   Patient presents with     Hypertension     Lipids     Diabetes       Initial Ht 1.626 m (5' 4\")   Wt 73.5 kg (162 lb)   BMI 27.81 kg/m   Estimated body mass index is 27.81 kg/m  as calculated from the following:    Height as of this encounter: 1.626 m (5' 4\").    Weight as of this encounter: 73.5 kg (162 lb).  Medication Reconciliation: complete  Shelbie Walsh LPN  "

## 2020-04-22 NOTE — PROGRESS NOTES
"Ena Lanza is a 79 year old female who is being evaluated via a billable telephone visit.      The patient has been notified of following:     \"This telephone visit will be conducted via a call between you and your physician/provider. We have found that certain health care needs can be provided without the need for a physical exam.  This service lets us provide the care you need with a short phone conversation.  If a prescription is necessary we can send it directly to your pharmacy.  If lab work is needed we can place an order for that and you can then stop by our lab to have the test done at a later time.    Telephone visits are billed at different rates depending on your insurance coverage. During this emergency period, for some insurers they may be billed the same as an in-person visit.  Please reach out to your insurance provider with any questions.    If during the course of the call the physician/provider feels a telephone visit is not appropriate, you will not be charged for this service.\"    Patient has given verbal consent for Telephone visit?  Yes    How would you like to obtain your AVS? PT DECLINED    Subjective     Ena Lanza is a 79 year old female who presents to clinic today for the following health issues:    HPI  Diabetes Follow-up    How often are you checking your blood sugar? One time daily  What time of day are you checking your blood sugars (select all that apply)?  every morning  Have you had any blood sugars above 200?  No  Have you had any blood sugars below 70?  No    What symptoms do you notice when your blood sugar is low?  Not applicable    What concerns do you have today about your diabetes? None     Do you have any of these symptoms? (Select all that apply)  No numbness or tingling in feet.  No redness, sores or blisters on feet.  No complaints of excessive thirst.  No reports of blurry vision.  No significant changes to weight.        Hyperlipidemia Follow-Up      Are you " regularly taking any medication or supplement to lower your cholesterol?   No    Are you having muscle aches or other side effects that you think could be caused by your cholesterol lowering medication?  No    Hypertension Follow-up      Do you check your blood pressure regularly outside of the clinic? Yes     Are you following a low salt diet? Yes    Are your blood pressures ever more than 140 on the top number (systolic) OR more   than 90 on the bottom number (diastolic), for example 140/90? Yes    BP Readings from Last 2 Encounters:   12/03/19 128/80   10/14/19 (!) 142/88     Hemoglobin A1C (%)   Date Value   06/11/2019 6.9 (H)   09/18/2018 6.5 (H)     LDL Cholesterol Calculated (mg/dL)   Date Value   06/11/2019 153 (H)   09/18/2018 138 (H)         How many servings of fruits and vegetables do you eat daily?  2-3    On average, how many sweetened beverages do you drink each day (Examples: soda, juice, sweet tea, etc.  Do NOT count diet or artificially sweetened beverages)?   Occasional cranberry juice    How many days per week do you exercise enough to make your heart beat faster? 7    How many minutes a day do you exercise enough to make your heart beat faster? 9 or less    How many days per week do you miss taking your medication? 0         Medication Followup of predisone.    Taking Medication as prescribed: has been off about a month    Side Effects:  None    Medication Helping Symptoms:  Need to go back on it due to pain        Patient Active Problem List   Diagnosis     Lesion of sciatic nerve     Hyperlipidemia with target LDL less than 100     HTN (hypertension)     DDD (degenerative disc disease), lumbar     Essential hypertension     Type 2 diabetes mellitus without complication (H)     ACP (advance care planning)     Newly recognized heart murmur     Essential hypertension with goal blood pressure less than 140/90     Herpes zoster with complication     Type 2 diabetes mellitus with hyperglycemia,  without long-term current use of insulin (H)     Heart murmur     Aortic valve sclerosis     PMR (polymyalgia rheumatica) (H)     TMJ (temporomandibular joint syndrome)     History reviewed. No pertinent surgical history.    Social History     Tobacco Use     Smoking status: Never Smoker     Smokeless tobacco: Never Used     Tobacco comment: yes passive exposure   Substance Use Topics     Alcohol use: No     Alcohol/week: 0.0 standard drinks     Family History   Problem Relation Age of Onset     C.A.D. Father 71        cause of death     Cancer Father         melanoma     Coronary Artery Disease Father      Thyroid Disease Mother      Hypertension Mother      Thyroid Disease Sister      Other Cancer Sister         melanoma     Diabetes No family hx of      Hyperlipidemia No family hx of      Asthma No family hx of      Breast Cancer No family hx of      Colon Cancer No family hx of          Current Outpatient Medications   Medication Sig Dispense Refill     aspirin 81 MG chewable tablet Take 1 tablet (81 mg) by mouth daily 108 tablet 3     blood glucose (ACCU-CHEK SMARTVIEW) test strip Use to test blood sugar 2 times daily or as directed 100 each 3     blood glucose monitoring (ACCU-CHEK FASTCLIX) lancets Use to test blood sugar 2 times daily. 200 each 0     blood glucose monitoring (ACCU-CHEK CINTHIA SMARTVIEW) meter device kit Use to test blood sugar 2 times daily or as directed. 1 kit 0     blood glucose monitoring (NO BRAND SPECIFIED) meter device kit Use to test blood sugar 3 times daily or as directed. Starting with starting of prednisone. 1 kit 1     ibuprofen (ADVIL/MOTRIN) 600 MG tablet Take 1 tablet (600 mg) by mouth every 6 hours as needed for moderate pain 60 tablet 1     lisinopril (PRINIVIL/ZESTRIL) 20 MG tablet Take 1.5 tablets (30 mg) by mouth daily 135 tablet 0     metFORMIN (GLUCOPHAGE-XR) 500 MG 24 hr tablet TAKE 2 TABLETS BY MOUTH TWICE DAILY WITH MEALS 360 tablet 0     insulin isophane human  "(HUMULIN N PEN) 100 UNIT/ML injection Inject 12 Units Subcutaneous daily (Patient not taking: Reported on 4/22/2020) 3 mL 3     STATIN NOT PRESCRIBED, INTENTIONAL, Statin not prescribed intentionally due to Intolerance (with supporting documentation of trying a statin at least once within the last 5 years) (Patient not taking: Reported on 4/22/2020) 0 each 0     triamcinolone (KENALOG) 0.025 % external ointment Apply at bedtime M, W, and Friday (Patient not taking: Reported on 4/22/2020) 80 g 3     No Known Allergies  Recent Labs   Lab Test 08/16/19  1407 06/11/19  1025 09/18/18  0922 08/21/17  0844  01/19/17  0916 06/13/16  1050 10/21/15  1008   A1C  --  6.9* 6.5* 5.8  --  6.8* 6.7* 6.4*   LDL  --  153* 138*  --   --  107* 110* 143*   HDL  --  63 75  --   --  69 67 73   TRIG  --  170* 201*  --   --  168* 202* 199*   ALT  --  20  --   --   --   --  18 27   CR 0.98 0.78 0.79  --    < > 0.74 0.70 0.65   GFRESTIMATED 55* 73 70  --    < > 77 81 89   GFRESTBLACK 64 84 85  --    < > >90   GFR Calc   >90   GFR Calc   >90   POTASSIUM 4.1 4.5 4.4  --    < > 3.5 3.6 3.9   TSH  --  1.14  --   --   --   --  1.03  --     < > = values in this interval not displayed.      BP Readings from Last 3 Encounters:   12/03/19 128/80   10/14/19 (!) 142/88   09/09/19 136/58    Wt Readings from Last 3 Encounters:   04/22/20 73.5 kg (162 lb)   12/03/19 73.5 kg (162 lb)   10/14/19 73.5 kg (162 lb)                    Reviewed and updated as needed this visit by Provider         Review of Systems   ROS COMP: Constitutional, HEENT, cardiovascular, pulmonary, gi and gu systems are negative, except as otherwise noted.       Objective   Reported vitals:  Ht 1.626 m (5' 4\")   Wt 73.5 kg (162 lb)   BMI 27.81 kg/m     healthy, alert and no distress  PSYCH: Alert and oriented times 3; coherent speech, normal   rate and volume, able to articulate logical thoughts, able   to abstract reason, no tangential thoughts, no " hallucinations   or delusions  Her affect is normal  RESP: No cough, no audible wheezing, able to talk in full sentences  Remainder of exam unable to be completed due to telephone visits    Diagnostic Test Results:  Labs reviewed in Epic  none         Assessment/Plan:  1. Essential hypertension with goal blood pressure less than 140/90  She is due for labs and follow up. Not checking outside of clinic.  Has pain and has increased her medications. We will get labs when pandemic opens.   - lisinopril (ZESTRIL) 20 MG tablet; Take 1.5 tablets (30 mg) by mouth daily  Dispense: 135 tablet; Refill: 0    2. PMR (polymyalgia rheumatica) (H)  We will have to start back on prednisone.  She is in pain again.  Started when she got to 3 mg and has worsened.  We will bust for 3 days of 10 mg and ten drop to 5 mg for a month.  Get a sed rate then in June and we will then start lowering if her sed rate is down by 1 mg per month. .    - predniSONE (DELTASONE) 5 MG tablet; Take two pills for 3 days then one pill there after.  Dispense: 90 tablet; Refill: 3  - Erythrocyte sedimentation rate auto; Future    3. Type 2 diabetes mellitus with hyperglycemia, with long-term current use of insulin (H)  She is going to be given refill fo medications. Sugars are all in range. Eye exam id not done as cancelled.         4. Hyperlipidemia with target LDL less than 100  She is not willing to take these. Has PMR and muscles are very sore.  We will place recommendations and allow her to decline.       No follow-ups on file.      Phone call duration:  17 minutes    EFREN Troy

## 2020-05-05 ENCOUNTER — TELEPHONE (OUTPATIENT)
Dept: FAMILY MEDICINE | Facility: OTHER | Age: 80
End: 2020-05-05

## 2020-05-05 DIAGNOSIS — Z79.4 TYPE 2 DIABETES MELLITUS WITH HYPERGLYCEMIA, WITH LONG-TERM CURRENT USE OF INSULIN (H): ICD-10-CM

## 2020-05-05 DIAGNOSIS — E11.65 TYPE 2 DIABETES MELLITUS WITH HYPERGLYCEMIA, WITH LONG-TERM CURRENT USE OF INSULIN (H): ICD-10-CM

## 2020-05-05 NOTE — TELEPHONE ENCOUNTER
Patient calling and is requesting insulin. States her blood sugars have been higher, 178-183, due to the prednisone and she is unable to get them down. Patient's sugars are usually .   Patient previously had Humulin on medication list. Pended this medication for review and approval.   Please advise, thank you.    Patient uses [facility] for pharmacy.    Patient's phone number is [phone]

## 2020-05-05 NOTE — TELEPHONE ENCOUNTER
Has PMR and was placed back on prednisone. Is on a taper.  She has a sliding scale to use for her sugars. Usually just takes 6 units if her sugar is high in the morning and this seems to help it come down.

## 2020-05-06 NOTE — TELEPHONE ENCOUNTER
Patient called and the insulin that was sent in yesterday to [facility] is not covered.They told pt to get a different type of insulin.    They faxed yesterday and about 5 minutes ago regarding this. She states she really needs something.Her BGL's have 170'-180's.    Please advise.      Call back [phone].    Shauna Flynn RN

## 2020-05-07 ENCOUNTER — TELEPHONE (OUTPATIENT)
Dept: FAMILY MEDICINE | Facility: OTHER | Age: 80
End: 2020-05-07

## 2020-05-07 DIAGNOSIS — Z79.4 TYPE 2 DIABETES MELLITUS WITH HYPERGLYCEMIA, WITH LONG-TERM CURRENT USE OF INSULIN (H): ICD-10-CM

## 2020-05-07 DIAGNOSIS — E11.65 TYPE 2 DIABETES MELLITUS WITH HYPERGLYCEMIA, WITH LONG-TERM CURRENT USE OF INSULIN (H): ICD-10-CM

## 2020-05-07 DIAGNOSIS — Z79.4 TYPE 2 DIABETES MELLITUS WITH HYPERGLYCEMIA, WITH LONG-TERM CURRENT USE OF INSULIN (H): Primary | ICD-10-CM

## 2020-05-07 DIAGNOSIS — E11.65 TYPE 2 DIABETES MELLITUS WITH HYPERGLYCEMIA, WITH LONG-TERM CURRENT USE OF INSULIN (H): Primary | ICD-10-CM

## 2020-05-07 RX ORDER — HUMAN INSULIN 100 [IU]/ML
12 INJECTION, SUSPENSION SUBCUTANEOUS DAILY
Qty: 3 ML | Refills: 3 | Status: SHIPPED | OUTPATIENT
Start: 2020-05-07 | End: 2020-05-08

## 2020-05-07 NOTE — TELEPHONE ENCOUNTER
Received a PA from [facility] for Humulin N Kwikpen 100 unit/ml Pen-injector. Submitted on CMM. Waiting for a response.

## 2020-05-07 NOTE — TELEPHONE ENCOUNTER
Received request from pharmacy that Humulin is not covered by patient's insurance. They are requesting Novolin N as an alternative. Medication pended for review and approval.  Please advise, thank you.

## 2020-05-08 ENCOUNTER — TELEPHONE (OUTPATIENT)
Dept: FAMILY MEDICINE | Facility: OTHER | Age: 80
End: 2020-05-08

## 2020-05-08 RX ORDER — HUMAN INSULIN 100 [IU]/ML
12 INJECTION, SUSPENSION SUBCUTANEOUS DAILY
Qty: 3 ML | Refills: 3 | Status: SHIPPED | OUTPATIENT
Start: 2020-05-08 | End: 2021-07-15

## 2020-05-08 NOTE — TELEPHONE ENCOUNTER
This drives me crazy, I think I wrote Novolin N the first time is back on prednisone and now needs coverage for her diabetes.  I sent in Novolin, Humulin Novopen . She gets 12 units a day.  Please help me someone on this.

## 2020-05-08 NOTE — TELEPHONE ENCOUNTER
Upon calling pt to inform her of the approved insulin, pt explained how yesterday she picked up meds got back to [facility] to find out she only received the cartridges. Pt explained how she rigged up a way to administer the 12 unit(s) yesterday. This process was not safe. I called the pharmacy to find out why. They stated pt picked up the Novopen Echo cartridges yesterday with no order for the administering device. They stated today they received an order for the Novolin N Flexpen, which was approved by insurance(confirmed this is the pen/insulin in one). Since that is all she needs the Novopen Echo cartridges order can be discontinued. Will notify pt that the proper insulin pen is awaiting  at the pharmacy.   Shelbie Walsh LPN

## 2020-07-09 ENCOUNTER — TELEPHONE (OUTPATIENT)
Dept: FAMILY MEDICINE | Facility: OTHER | Age: 80
End: 2020-07-09

## 2020-07-09 DIAGNOSIS — M35.3 PMR (POLYMYALGIA RHEUMATICA) (H): ICD-10-CM

## 2020-07-09 DIAGNOSIS — M26.609 TMJ (TEMPOROMANDIBULAR JOINT SYNDROME): ICD-10-CM

## 2020-07-09 RX ORDER — IBUPROFEN 600 MG/1
600 TABLET, FILM COATED ORAL EVERY 6 HOURS PRN
Qty: 60 TABLET | Refills: 1 | Status: SHIPPED | OUTPATIENT
Start: 2020-07-09 | End: 2021-03-05

## 2020-07-09 RX ORDER — PREDNISONE 5 MG/1
TABLET ORAL
Qty: 10 TABLET | Refills: 0 | Status: SHIPPED | OUTPATIENT
Start: 2020-07-09 | End: 2020-07-09 | Stop reason: ALTCHOICE

## 2020-07-09 RX ORDER — PREDNISONE 5 MG/1
5 TABLET ORAL DAILY
Qty: 30 TABLET | Refills: 3 | Status: SHIPPED | OUTPATIENT
Start: 2020-07-09 | End: 2020-07-16

## 2020-07-09 NOTE — TELEPHONE ENCOUNTER
Patient calling and is requesting prednisone refill until she can be seen next week. Pended medication for approval.    ibuprofen (ADVIL/MOTRIN) 600 MG tablet       Last Written Prescription Date:  12/03/19  Last Fill Quantity: 60,   # refills: 1  Last Office Visit: 04/22/20  Future Office visit:    Next 5 appointments (look out 90 days)    Jul 16, 2020  1:30 PM CDT  (Arrive by 1:15 PM)  SHORT with EFREN Salinas  [facility] [address]  [place] [address]  [phone]

## 2020-07-16 ENCOUNTER — OFFICE VISIT (OUTPATIENT)
Dept: FAMILY MEDICINE | Facility: OTHER | Age: 80
End: 2020-07-16
Attending: PHYSICIAN ASSISTANT
Payer: MEDICARE

## 2020-07-16 VITALS
HEART RATE: 74 BPM | DIASTOLIC BLOOD PRESSURE: 60 MMHG | OXYGEN SATURATION: 97 % | TEMPERATURE: 97.8 F | BODY MASS INDEX: 27.66 KG/M2 | SYSTOLIC BLOOD PRESSURE: 130 MMHG | HEIGHT: 64 IN | WEIGHT: 162 LBS

## 2020-07-16 DIAGNOSIS — E11.9 TYPE 2 DIABETES MELLITUS WITHOUT COMPLICATION, WITHOUT LONG-TERM CURRENT USE OF INSULIN (H): ICD-10-CM

## 2020-07-16 DIAGNOSIS — M35.3 PMR (POLYMYALGIA RHEUMATICA) (H): Primary | ICD-10-CM

## 2020-07-16 LAB
ALBUMIN SERPL-MCNC: 4.1 G/DL (ref 3.4–5)
ALP SERPL-CCNC: 89 U/L (ref 40–150)
ALT SERPL W P-5'-P-CCNC: 25 U/L (ref 0–50)
ANION GAP SERPL CALCULATED.3IONS-SCNC: 3 MMOL/L (ref 3–14)
AST SERPL W P-5'-P-CCNC: 12 U/L (ref 0–45)
BILIRUB SERPL-MCNC: 0.4 MG/DL (ref 0.2–1.3)
BUN SERPL-MCNC: 19 MG/DL (ref 7–30)
CALCIUM SERPL-MCNC: 9.7 MG/DL (ref 8.5–10.1)
CHLORIDE SERPL-SCNC: 109 MMOL/L (ref 94–109)
CO2 SERPL-SCNC: 26 MMOL/L (ref 20–32)
CREAT SERPL-MCNC: 0.78 MG/DL (ref 0.52–1.04)
ERYTHROCYTE [SEDIMENTATION RATE] IN BLOOD BY WESTERGREN METHOD: 16 MM/H (ref 0–30)
EST. AVERAGE GLUCOSE BLD GHB EST-MCNC: 180 MG/DL
GFR SERPL CREATININE-BSD FRML MDRD: 72 ML/MIN/{1.73_M2}
GLUCOSE SERPL-MCNC: 171 MG/DL (ref 70–99)
HBA1C MFR BLD: 7.9 % (ref 0–5.6)
POTASSIUM SERPL-SCNC: 4 MMOL/L (ref 3.4–5.3)
PROT SERPL-MCNC: 7.8 G/DL (ref 6.8–8.8)
SODIUM SERPL-SCNC: 138 MMOL/L (ref 133–144)

## 2020-07-16 PROCEDURE — 85652 RBC SED RATE AUTOMATED: CPT | Mod: ZL | Performed by: PHYSICIAN ASSISTANT

## 2020-07-16 PROCEDURE — 99213 OFFICE O/P EST LOW 20 MIN: CPT | Performed by: PHYSICIAN ASSISTANT

## 2020-07-16 PROCEDURE — G0463 HOSPITAL OUTPT CLINIC VISIT: HCPCS

## 2020-07-16 PROCEDURE — 83036 HEMOGLOBIN GLYCOSYLATED A1C: CPT | Mod: ZL | Performed by: PHYSICIAN ASSISTANT

## 2020-07-16 PROCEDURE — 40000788 ZZHCL STATISTIC ESTIMATED AVERAGE GLUCOSE: Mod: ZL | Performed by: PHYSICIAN ASSISTANT

## 2020-07-16 PROCEDURE — 80053 COMPREHEN METABOLIC PANEL: CPT | Mod: ZL | Performed by: PHYSICIAN ASSISTANT

## 2020-07-16 PROCEDURE — 36415 COLL VENOUS BLD VENIPUNCTURE: CPT | Mod: ZL | Performed by: PHYSICIAN ASSISTANT

## 2020-07-16 RX ORDER — PREDNISONE 5 MG/1
5 TABLET ORAL DAILY
Qty: 30 TABLET | Refills: 3 | Status: SHIPPED | OUTPATIENT
Start: 2020-07-16 | End: 2021-03-05

## 2020-07-16 ASSESSMENT — ANXIETY QUESTIONNAIRES
4. TROUBLE RELAXING: NOT AT ALL
5. BEING SO RESTLESS THAT IT IS HARD TO SIT STILL: NOT AT ALL
7. FEELING AFRAID AS IF SOMETHING AWFUL MIGHT HAPPEN: NOT AT ALL
1. FEELING NERVOUS, ANXIOUS, OR ON EDGE: NOT AT ALL
3. WORRYING TOO MUCH ABOUT DIFFERENT THINGS: NOT AT ALL
GAD7 TOTAL SCORE: 0
2. NOT BEING ABLE TO STOP OR CONTROL WORRYING: NOT AT ALL
6. BECOMING EASILY ANNOYED OR IRRITABLE: NOT AT ALL

## 2020-07-16 ASSESSMENT — PAIN SCALES - GENERAL: PAINLEVEL: NO PAIN (0)

## 2020-07-16 ASSESSMENT — MIFFLIN-ST. JEOR: SCORE: 1199.83

## 2020-07-16 ASSESSMENT — PATIENT HEALTH QUESTIONNAIRE - PHQ9: SUM OF ALL RESPONSES TO PHQ QUESTIONS 1-9: 0

## 2020-07-16 NOTE — PROGRESS NOTES
Subjective     Ena Lnaza is a 79 year old female who presents to clinic today for the following health issues:    HPI       Medication Followup of  Prednisone 5mg    Taking Medication as prescribed: yes    Side Effects:  None    Medication Helping Symptoms:  Yes has been feeling really good with the 5mg and wants to know if she should continue with it or not.          Patient Active Problem List   Diagnosis     Lesion of sciatic nerve     Hyperlipidemia with target LDL less than 100     HTN (hypertension)     DDD (degenerative disc disease), lumbar     Essential hypertension     Type 2 diabetes mellitus without complication (H)     ACP (advance care planning)     Essential hypertension with goal blood pressure less than 140/90     Herpes zoster with complication     Type 2 diabetes mellitus with hyperglycemia, without long-term current use of insulin (H)     Aortic valve sclerosis     PMR (polymyalgia rheumatica) (H)     TMJ (temporomandibular joint syndrome)     History reviewed. No pertinent surgical history.    Social History     Tobacco Use     Smoking status: Never Smoker     Smokeless tobacco: Never Used     Tobacco comment: yes passive exposure   Substance Use Topics     Alcohol use: No     Alcohol/week: 0.0 standard drinks     Family History   Problem Relation Age of Onset     C.A.D. Father 71        cause of death     Cancer Father         melanoma     Coronary Artery Disease Father      Thyroid Disease Mother      Hypertension Mother      Thyroid Disease Sister      Other Cancer Sister         melanoma     Diabetes No family hx of      Hyperlipidemia No family hx of      Asthma No family hx of      Breast Cancer No family hx of      Colon Cancer No family hx of          Current Outpatient Medications   Medication Sig Dispense Refill     aspirin 81 MG chewable tablet Take 1 tablet (81 mg) by mouth daily 108 tablet 3     blood glucose (ACCU-CHEK SMARTVIEW) test strip Use to test blood sugar 2 times  daily or as directed 100 each 3     blood glucose monitoring (ACCU-CHEK FASTCLIX) lancets Use to test blood sugar 2 times daily. 200 each 0     blood glucose monitoring (ACCU-CHEK CINTHIA SMARTVIEW) meter device kit Use to test blood sugar 2 times daily or as directed. 1 kit 0     blood glucose monitoring (NO BRAND SPECIFIED) meter device kit Use to test blood sugar 3 times daily or as directed. Starting with starting of prednisone. 1 kit 1     ibuprofen (ADVIL/MOTRIN) 600 MG tablet Take 1 tablet (600 mg) by mouth every 6 hours as needed for moderate pain 60 tablet 1     insulin aspart (NOVOPEN ECHO) 100 UNIT/ML cartridge Inject 12 Units Subcutaneous 4 times daily (with meals and nightly) 9 mL 1     insulin isophane human (HUMULIN N PEN) 100 UNIT/ML injection Inject 12 Units Subcutaneous daily 3 mL 3     insulin isophane human (NOVOLIN N FLEXPEN) 100 UNIT/ML injection Inject 12 Units Subcutaneous daily Route: Inject 12 Units Subcutaneous daily - Subcutaneous 3 mL 3     lisinopril (ZESTRIL) 20 MG tablet Take 1.5 tablets (30 mg) by mouth daily 135 tablet 0     metFORMIN (GLUCOPHAGE-XR) 500 MG 24 hr tablet TAKE 2 TABLETS BY MOUTH TWICE DAILY WITH MEALS 360 tablet 0     predniSONE (DELTASONE) 5 MG tablet Take 1 tablet (5 mg) by mouth daily 30 tablet 3     STATIN NOT PRESCRIBED, INTENTIONAL, Statin not prescribed intentionally due to Intolerance (with supporting documentation of trying a statin at least once within the last 5 years) 0 each 0     triamcinolone (KENALOG) 0.025 % external ointment Apply at bedtime M, W, and Friday 80 g 3     No Known Allergies  Recent Labs   Lab Test 08/16/19  1407 06/11/19  1025 09/18/18  0922 08/21/17  0844  01/19/17  0916 06/13/16  1050 10/21/15  1008   A1C  --  6.9* 6.5* 5.8  --  6.8* 6.7* 6.4*   LDL  --  153* 138*  --   --  107* 110* 143*   HDL  --  63 75  --   --  69 67 73   TRIG  --  170* 201*  --   --  168* 202* 199*   ALT  --  20  --   --   --   --  18 27   CR 0.98 0.78 0.79  --    <  "> 0.74 0.70 0.65   GFRESTIMATED 55* 73 70  --    < > 77 81 89   GFRESTBLACK 64 84 85  --    < > >90   GFR Calc   >90   GFR Calc   >90   POTASSIUM 4.1 4.5 4.4  --    < > 3.5 3.6 3.9   TSH  --  1.14  --   --   --   --  1.03  --     < > = values in this interval not displayed.      BP Readings from Last 3 Encounters:   07/16/20 130/60   12/03/19 128/80   10/14/19 (!) 142/88    Wt Readings from Last 3 Encounters:   07/16/20 73.5 kg (162 lb)   04/22/20 73.5 kg (162 lb)   12/03/19 73.5 kg (162 lb)                    Reviewed and updated as needed this visit by Provider         Review of Systems   Constitutional, HEENT, cardiovascular, pulmonary, gi and gu systems are negative, except as otherwise noted.      Objective    /60 (BP Location: Right arm, Patient Position: Sitting, Cuff Size: Adult Regular)   Pulse 74   Temp 97.8  F (36.6  C) (Tympanic)   Ht 1.626 m (5' 4\")   Wt 73.5 kg (162 lb)   SpO2 97%   BMI 27.81 kg/m    Body mass index is 27.81 kg/m .  Physical Exam   GENERAL: healthy, alert and no distress  EYES: Eyes grossly normal to inspection, PERRL and conjunctivae and sclerae normal  HENT: ear canals and TM's normal, nose and mouth without ulcers or lesions  NECK: no adenopathy, no asymmetry, masses, or scars and thyroid normal to palpation  RESP: lungs clear to auscultation - no rales, rhonchi or wheezes  CV: regular rate and rhythm, normal S1 S2, no S3 or S4, no murmur, click or rub, no peripheral edema and peripheral pulses strong  ABDOMEN: soft, nontender, no hepatosplenomegaly, no masses and bowel sounds normal  MS: no gross musculoskeletal defects noted, no edema  PSYCH: mentation appears normal, affect normal/bright    Diagnostic Test Results:  Labs reviewed in Epic  No results found for this or any previous visit (from the past 24 hour(s)).        Assessment & Plan     1. PMR (polymyalgia rheumatica) (H)  We tried going to try to keep her feeling no pain. Was " "quite weak and severe flair even dropping from 2 to 1.  So back at 5 for another 3 months. Then drop 1 mg every 3 months sugars are doing well but will check A1C every 3 months until off prednisone. No use of insulin any more due to the prednisone.   - Erythrocyte sedimentation rate auto  - Comprehensive metabolic panel (BMP + Alb, Alk Phos, ALT, AST, Total. Bili, TP)  - predniSONE (DELTASONE) 5 MG tablet; Take 1 tablet (5 mg) by mouth daily  Dispense: 30 tablet; Refill: 3    2. Type 2 diabetes mellitus without complication, without long-term current use of insulin (H)  Given a1c.   We will see her back in 3 months.   - Hemoglobin A1c     BMI:   Estimated body mass index is 27.81 kg/m  as calculated from the following:    Height as of this encounter: 1.626 m (5' 4\").    Weight as of this encounter: 73.5 kg (162 lb).           See Patient Instructions    No follow-ups on file.    EFREN Troy  [facility] [place]    "

## 2020-07-16 NOTE — PROGRESS NOTES
"Subjective     Ena Lanza is a 79 year old female who presents to clinic today for the following health issues:    HPI       Follow Up      Duration: ***    Description (location/character/radiation): ***    Intensity:  {mild,moderate,severe:821265}    Accompanying signs and symptoms: ***    History (similar episodes/previous evaluation): {.:898915::\"None\"}    Precipitating or alleviating factors: {.:288619::\"None\"}    Therapies tried and outcome: {.:305676::\"None\"}     {additonal problems for provider to add (Optional):666454}    {HIST REVIEW/ LINKS 2 (Optional):162380}    Reviewed and updated as needed this visit by Provider         Review of Systems   {ROS COMP (Optional):759727}      Objective    There were no vitals taken for this visit.  There is no height or weight on file to calculate BMI.  Physical Exam   {Exam List (Optional):927188}    {Diagnostic Test Results (Optional):434318::\"Diagnostic Test Results:\",\"Labs reviewed in Epic\"}        {PROVIDER CHARTING PREFERENCE:887164}    "

## 2020-07-16 NOTE — PATIENT INSTRUCTIONS
Thank you for choosing [facility].   I have office hours 8:00 am to 4:30 pm on Monday's, Wednesday's, Thursday's and Friday's. My nurse and I are out of the office every Tuesday.    Following your visit, when your labs and diagnostic testing have returned, I will review then and you will be contacted by my nurse.  If you are on My Chart, you can also view results there.    For refills, notify your pharmacy regarding what you need and the pharmacy will generate a refill request. Do not call my nurse as she is unable to process refill request. Please plan ahead and allow 3-5 days for refill requests.    You will generally receive a reminder call the day prior to your appointment.  If you cannot attend your appointment, please cancel your appointment with as much notice as possible.  If there is a pattern of failure to present for your appointments, I cannot provide consistent, meaningful, ongoing care for you. It is very important to me that you come in for your care, so we can best assist you with your health care needs.    IMPORTANT:  Please note that it is my standard of practice to NOT participate in prescribing ongoing requested Narcotic Analgesic therapy, and/or participate in the prescribing of other controlled substances.  My nurse and I am happy to assist you with the process of referral for alternative pain management as needed, and other treatment modalities including but not limited to:  Physical Therapy, Physical Medicine and Rehab, Counseling, Chiropractic Care, Orthopedic Care, and non-narcotic medication management.     In the event that you need to be seen for emergent concerns and I am out of office,  please see one of my colleagues for acute concerns.  You may also present to  or ER.  I appreciate the opportunity to serve you and look forward to supporting your healthcare needs in the future. Please contact me with any questions or concerns that you may  have.    Sincerely,      Amanda Luu RN, PA-C

## 2020-07-16 NOTE — NURSING NOTE
"Chief Complaint   Patient presents with     follow up on Prednisone       Initial /60 (BP Location: Right arm, Patient Position: Sitting, Cuff Size: Adult Regular)   Pulse 74   Temp 97.8  F (36.6  C) (Tympanic)   Ht 1.626 m (5' 4\")   Wt 73.5 kg (162 lb)   SpO2 97%   BMI 27.81 kg/m   Estimated body mass index is 27.81 kg/m  as calculated from the following:    Height as of this encounter: 1.626 m (5' 4\").    Weight as of this encounter: 73.5 kg (162 lb).  Medication Reconciliation: complete  Loulou Olivarez LPN  "

## 2020-07-17 ASSESSMENT — ANXIETY QUESTIONNAIRES: GAD7 TOTAL SCORE: 0

## 2020-07-29 DIAGNOSIS — I10 ESSENTIAL HYPERTENSION WITH GOAL BLOOD PRESSURE LESS THAN 140/90: ICD-10-CM

## 2020-07-29 RX ORDER — LISINOPRIL 20 MG/1
30 TABLET ORAL DAILY
Qty: 135 TABLET | Refills: 1 | Status: SHIPPED | OUTPATIENT
Start: 2020-07-29 | End: 2021-06-24

## 2020-07-29 NOTE — TELEPHONE ENCOUNTER
Lisinopril   Last visit: 7.16.20  Last refill: 4.22.20    Future appointments:   Next 5 appointments (look out 90 days)    Oct 16, 2020  1:20 PM CDT  (Arrive by 1:05 PM)  SHORT with EFREN Salinas  [facility] ([facility] [place] ) [address]  [place] [address]  [phone]

## 2020-11-11 DIAGNOSIS — E11.65 TYPE 2 DIABETES MELLITUS WITH HYPERGLYCEMIA, WITHOUT LONG-TERM CURRENT USE OF INSULIN (H): ICD-10-CM

## 2020-11-11 NOTE — TELEPHONE ENCOUNTER
metFORMIN (GLUCOPHAGE-XR) 500 MG 24 hr tablet        Last Written Prescription Date:  4/17/20  Last Fill Quantity: 360,   # refills: 0  Last Office Visit: 7/16/20  Future Office visit:    Next 5 appointments (look out 90 days)    Nov 19, 2020  1:30 PM  (Arrive by 1:15 PM)  SHORT with EFREN Salinas  [facility] [place] ([facility] [place] ) [address]  [place] [address]  [phone]           Routing refill request to provider for review/approval because:    Pt had to cancel scheduled appt for today due to the weather, appt has been rescheduled    Next 5 appointments (look out 90 days)    Nov 19, 2020  1:30 PM  (Arrive by 1:15 PM)  SHORT with EFREN Salinas  [facility] [place] ([facility] [place] ) [address]  [place] [address]  [phone]

## 2020-11-12 ENCOUNTER — TELEPHONE (OUTPATIENT)
Dept: FAMILY MEDICINE | Facility: OTHER | Age: 80
End: 2020-11-12

## 2020-11-12 DIAGNOSIS — E11.65 TYPE 2 DIABETES MELLITUS WITH HYPERGLYCEMIA, WITHOUT LONG-TERM CURRENT USE OF INSULIN (H): ICD-10-CM

## 2020-11-12 DIAGNOSIS — M35.3 PMR (POLYMYALGIA RHEUMATICA) (H): Primary | ICD-10-CM

## 2020-11-12 RX ORDER — METFORMIN HCL 500 MG
TABLET, EXTENDED RELEASE 24 HR ORAL
Qty: 360 TABLET | Refills: 0 | Status: SHIPPED | OUTPATIENT
Start: 2020-11-12 | End: 2021-03-05

## 2020-11-12 NOTE — TELEPHONE ENCOUNTER
Call from patient inquiring on lab orders for an A1C, ESR and any other labs pt is in need of for 6 month follow up upcoming.     Appt scheduled for lab:    11/13/20 at 130 pm     Follow up appt with Amanda Luu.     Next 5 appointments (look out 90 days)    Nov 19, 2020  1:30 PM  (Arrive by 1:15 PM)  SHORT with EFREN Salinas  [facility] [place] ([facility] [place] ) [address]  [place] [address]  [phone]

## 2020-11-13 DIAGNOSIS — E11.65 TYPE 2 DIABETES MELLITUS WITH HYPERGLYCEMIA, WITHOUT LONG-TERM CURRENT USE OF INSULIN (H): ICD-10-CM

## 2020-11-13 DIAGNOSIS — M35.3 PMR (POLYMYALGIA RHEUMATICA) (H): ICD-10-CM

## 2020-11-13 LAB
ERYTHROCYTE [SEDIMENTATION RATE] IN BLOOD BY WESTERGREN METHOD: 13 MM/H (ref 0–30)
EST. AVERAGE GLUCOSE BLD GHB EST-MCNC: 177 MG/DL
HBA1C MFR BLD: 7.8 % (ref 0–5.6)

## 2020-11-13 PROCEDURE — 999N001182 HC STATISTIC ESTIMATED AVERAGE GLUCOSE: Mod: ZL | Performed by: PHYSICIAN ASSISTANT

## 2020-11-13 PROCEDURE — 36416 COLLJ CAPILLARY BLOOD SPEC: CPT | Mod: ZL | Performed by: PHYSICIAN ASSISTANT

## 2020-11-13 PROCEDURE — 83036 HEMOGLOBIN GLYCOSYLATED A1C: CPT | Mod: ZL | Performed by: PHYSICIAN ASSISTANT

## 2020-11-13 PROCEDURE — 85652 RBC SED RATE AUTOMATED: CPT | Mod: ZL | Performed by: PHYSICIAN ASSISTANT

## 2020-11-13 PROCEDURE — 36415 COLL VENOUS BLD VENIPUNCTURE: CPT | Mod: ZL | Performed by: PHYSICIAN ASSISTANT

## 2020-11-18 NOTE — PROGRESS NOTES
"Subjective     Ena Lanza is a 80 year old female who presents to clinic today for the following health issues:    HPI         Diabetes Follow-up    How often are you checking your blood sugar? One time daily  What time of day are you checking your blood sugars (select all that apply)?  Before meals  Have you had any blood sugars above 200?  No  Have you had any blood sugars below 70?  No    What symptoms do you notice when your blood sugar is low?  None    What concerns do you have today about your diabetes? None     Do you have any of these symptoms? (Select all that apply)  No numbness or tingling in feet.  No redness, sores or blisters on feet.  No complaints of excessive thirst.  No reports of blurry vision.  No significant changes to weight.    Have you had a diabetic eye exam in the last 12 months? No        BP Readings from Last 2 Encounters:   11/19/20 (!) 142/70   07/16/20 130/60     Hemoglobin A1C (%)   Date Value   11/13/2020 7.8 (H)   07/16/2020 7.9 (H)     LDL Cholesterol Calculated (mg/dL)   Date Value   06/11/2019 153 (H)   09/18/2018 138 (H)               Review of Systems   Constitutional, HEENT, cardiovascular, pulmonary, GI, , musculoskeletal, neuro, skin, endocrine and psych systems are negative, except as otherwise noted.      Objective    BP (!) 142/70 (BP Location: Right arm, Patient Position: Sitting, Cuff Size: Adult Regular)   Pulse 79   Temp 97.8  F (36.6  C) (Tympanic)   Ht 1.626 m (5' 4\")   Wt 73.5 kg (162 lb)   SpO2 98%   BMI 27.81 kg/m    Body mass index is 27.81 kg/m .  Physical Exam   GENERAL: healthy, alert and no distress  EYES: Eyes grossly normal to inspection, PERRL and conjunctivae and sclerae normal  HENT: ear canals and TM's normal, nose and mouth without ulcers or lesions  NECK: no adenopathy, no asymmetry, masses, or scars and thyroid normal to palpation  RESP: lungs clear to auscultation - no rales, rhonchi or wheezes  CV: regular rate and rhythm, normal S1 " S2, no S3 or S4, no murmur, click or rub, no peripheral edema and peripheral pulses strong  ABDOMEN: soft, nontender, no hepatosplenomegaly, no masses and bowel sounds normal  MS: no gross musculoskeletal defects noted, no edema, foot exam is clear. No callous or open areas good sensation on bottoms and top of foot. Pulses are trace.   SKIN: no suspicious lesions or rashes  NEURO: Normal strength and tone, mentation intact and speech normal  BACK: no CVA tenderness, no paralumbar tenderness  PSYCH: mentation appears normal, affect normal/bright  LYMPH: no cervical, supraclavicular, axillary, or inguinal adenopathy    No results found for this or any previous visit (from the past 24 hour(s)).      Lab Results   Component Value Date    A1C 7.8 11/13/2020    A1C 7.9 07/16/2020    A1C 6.9 06/11/2019    A1C 6.5 09/18/2018    A1C 5.8 08/21/2017     Lab Results   Component Value Date    WBC 10.5 08/16/2019     Lab Results   Component Value Date    RBC 3.89 08/16/2019     Lab Results   Component Value Date    HGB 11.7 08/16/2019     Lab Results   Component Value Date    HCT 35.1 08/16/2019     No components found for: MCT  Lab Results   Component Value Date    MCV 90 08/16/2019     Lab Results   Component Value Date    MCH 30.1 08/16/2019     Lab Results   Component Value Date    MCHC 33.3 08/16/2019     Lab Results   Component Value Date    RDW 13.4 08/16/2019     Lab Results   Component Value Date     08/16/2019     Sed Rate 13.     Assessment & Plan     Need for prophylactic vaccination and inoculation against influenza  Given today.   - FLUZONE HIGH DOSE 65+  [40093]    Type 2 diabetes mellitus without complication, without long-term current use of insulin (H)  Sugars are higher due to prednisone. she is going to begin taking the 6 unit(s) when she is over 200. She is feeling ok. Occasional not feeling well when sugar high but never a low number on her glucose. Going to get her medication.   - NE FOOT EXAM NO  CHARGE  - Albumin Random Urine Quantitative with Creat Ratio  - Lipid Profile; Future    PMR (polymyalgia rheumatica) (H)  Sed rate at 13 we will be lowering to 4 mg daily for a month or two. See how this goes. Took a few months at 5 mg before legs got stronger again.   - predniSONE (DELTASONE) 1 MG tablet; Take 4 tablets (4 mg) by mouth daily    Essential hypertension  Blood pressure is a little but high. We will be continue on medications.            See Patient Instructions    No follow-ups on file.    Amanda Luu PA-C  [facility] [place]

## 2020-11-19 ENCOUNTER — OFFICE VISIT (OUTPATIENT)
Dept: FAMILY MEDICINE | Facility: OTHER | Age: 80
End: 2020-11-19
Attending: PHYSICIAN ASSISTANT
Payer: COMMERCIAL

## 2020-11-19 VITALS
DIASTOLIC BLOOD PRESSURE: 70 MMHG | OXYGEN SATURATION: 98 % | BODY MASS INDEX: 27.66 KG/M2 | WEIGHT: 162 LBS | SYSTOLIC BLOOD PRESSURE: 142 MMHG | HEART RATE: 79 BPM | TEMPERATURE: 97.8 F | HEIGHT: 64 IN

## 2020-11-19 DIAGNOSIS — M35.3 PMR (POLYMYALGIA RHEUMATICA) (H): ICD-10-CM

## 2020-11-19 DIAGNOSIS — E11.9 TYPE 2 DIABETES MELLITUS WITHOUT COMPLICATION, WITHOUT LONG-TERM CURRENT USE OF INSULIN (H): ICD-10-CM

## 2020-11-19 DIAGNOSIS — Z23 NEED FOR PROPHYLACTIC VACCINATION AND INOCULATION AGAINST INFLUENZA: Primary | ICD-10-CM

## 2020-11-19 DIAGNOSIS — I10 ESSENTIAL HYPERTENSION: ICD-10-CM

## 2020-11-19 LAB
CREAT UR-MCNC: 65 MG/DL
MICROALBUMIN UR-MCNC: 192 MG/L
MICROALBUMIN/CREAT UR: 295.38 MG/G CR (ref 0–25)

## 2020-11-19 PROCEDURE — 82043 UR ALBUMIN QUANTITATIVE: CPT | Mod: ZL | Performed by: PHYSICIAN ASSISTANT

## 2020-11-19 PROCEDURE — 99214 OFFICE O/P EST MOD 30 MIN: CPT | Performed by: PHYSICIAN ASSISTANT

## 2020-11-19 PROCEDURE — G0463 HOSPITAL OUTPT CLINIC VISIT: HCPCS | Mod: 25

## 2020-11-19 PROCEDURE — G0463 HOSPITAL OUTPT CLINIC VISIT: HCPCS

## 2020-11-19 PROCEDURE — G0008 ADMIN INFLUENZA VIRUS VAC: HCPCS

## 2020-11-19 RX ORDER — PREDNISONE 1 MG/1
4 TABLET ORAL DAILY
Qty: 120 TABLET | Refills: 3 | Status: SHIPPED | OUTPATIENT
Start: 2020-11-19

## 2020-11-19 ASSESSMENT — PAIN SCALES - GENERAL: PAINLEVEL: NO PAIN (0)

## 2020-11-19 ASSESSMENT — MIFFLIN-ST. JEOR: SCORE: 1194.83

## 2020-12-01 ENCOUNTER — TELEPHONE (OUTPATIENT)
Dept: FAMILY MEDICINE | Facility: OTHER | Age: 80
End: 2020-12-01

## 2020-12-01 ENCOUNTER — NURSE TRIAGE (OUTPATIENT)
Dept: FAMILY MEDICINE | Facility: OTHER | Age: 80
End: 2020-12-01

## 2020-12-01 NOTE — TELEPHONE ENCOUNTER
This isn't clear to me. I would have Amanda clarify and give recommendations tomorrow when she returns

## 2020-12-01 NOTE — TELEPHONE ENCOUNTER
Call from patient expressing concerns of her blood sugar readings over the last couple days running between 154-222.     Reports she is taking Metformin 2 tablets twice daily with meals and no other medications for blood sugars.     Per review of Amanda Luu's Office Visit Note on 11/19/20, pt was to: begin taking the 6 units when she is over 200 per Amanda Luu Office Visit Note on 11/19/20.    Please advise what insulin pt is to administer 6 units of if her blood sugars are >200 as pt is unaware and this writer is unable to determine after review of the office note and med list.     Pt reports feeling emmanuel shaky and nauseated when her blood sugars are >200.    Please advise and reach out to patient at [phone].

## 2020-12-02 DIAGNOSIS — E11.65 TYPE 2 DIABETES MELLITUS WITH HYPERGLYCEMIA, WITH LONG-TERM CURRENT USE OF INSULIN (H): ICD-10-CM

## 2020-12-02 DIAGNOSIS — Z79.4 TYPE 2 DIABETES MELLITUS WITH HYPERGLYCEMIA, WITH LONG-TERM CURRENT USE OF INSULIN (H): ICD-10-CM

## 2020-12-02 RX ORDER — HUMAN INSULIN 100 [IU]/ML
12 INJECTION, SUSPENSION SUBCUTANEOUS DAILY
Qty: 3 ML | Refills: 3 | Status: CANCELLED | OUTPATIENT
Start: 2020-12-02

## 2020-12-02 NOTE — TELEPHONE ENCOUNTER
Per diabetic Ed we gave her a paper and if over 200 she is taking 6 units once a day.  Has worked well in past. Hept her A1C down. Has PMR and we have tried twice to stop prednisone. Had to go back  Up on this and still has insulin to take.

## 2020-12-03 NOTE — TELEPHONE ENCOUNTER
Patient calling in regards to her insulin Rx. I called [facility] pharmacy, they stated patient has not picked up refills. Stated they will get it ready, but it is out of stock and have to order this. It will be ready for patient to  tomorrow. Notified patient of this and she verbalized understanding.

## 2020-12-07 DIAGNOSIS — E11.65 TYPE 2 DIABETES MELLITUS WITH HYPERGLYCEMIA, WITHOUT LONG-TERM CURRENT USE OF INSULIN (H): ICD-10-CM

## 2020-12-07 RX ORDER — BLOOD SUGAR DIAGNOSTIC
STRIP MISCELLANEOUS
Qty: 100 EACH | Refills: 3 | Status: SHIPPED | OUTPATIENT
Start: 2020-12-07 | End: 2020-12-08

## 2020-12-07 NOTE — TELEPHONE ENCOUNTER
Test Strips       Last Written Prescription Date:  9/13/2019  Last Fill Quantity: 100,   # refills: 3  Last Office Visit: 11/19/2020  Future Office visit:

## 2020-12-07 NOTE — TELEPHONE ENCOUNTER
Call from pt reporting she is out of the test strips today.     Notified the refill request has been sent to a covering provider as Amanda Luu is out of the office on 12/7/20.

## 2020-12-08 ENCOUNTER — TELEPHONE (OUTPATIENT)
Dept: FAMILY MEDICINE | Facility: OTHER | Age: 80
End: 2020-12-08

## 2020-12-08 NOTE — TELEPHONE ENCOUNTER
Pt called, reports continued issues with hyperglycemia. Pt currently takes insulin novopen 8 units BID and metformin 1000mg BID. Reports that she has had increased stress lately due to her granddaughter's illness. States that her lowest blood sugar reading over the past two weeks was 145. Pt is very concerned. Looks like she is not taking insulin as ordered on med list or as noted in office visit 11/19. Pt wondering if she should increase her insulin. Please advise. Thank you!

## 2020-12-09 DIAGNOSIS — E11.65 TYPE 2 DIABETES MELLITUS WITH HYPERGLYCEMIA, WITHOUT LONG-TERM CURRENT USE OF INSULIN (H): ICD-10-CM

## 2020-12-09 DIAGNOSIS — E11.65 TYPE 2 DIABETES MELLITUS WITH HYPERGLYCEMIA, WITH LONG-TERM CURRENT USE OF INSULIN (H): ICD-10-CM

## 2020-12-09 DIAGNOSIS — Z79.4 TYPE 2 DIABETES MELLITUS WITH HYPERGLYCEMIA, WITH LONG-TERM CURRENT USE OF INSULIN (H): ICD-10-CM

## 2020-12-11 ENCOUNTER — TELEPHONE (OUTPATIENT)
Dept: FAMILY MEDICINE | Facility: OTHER | Age: 80
End: 2020-12-11

## 2020-12-11 NOTE — TELEPHONE ENCOUNTER
Received medicare form from [employer] for pt's diabetic supplies. Form completed and sent to provider for signature.

## 2021-02-26 ENCOUNTER — TELEPHONE (OUTPATIENT)
Dept: FAMILY MEDICINE | Facility: OTHER | Age: 81
End: 2021-02-26

## 2021-02-26 NOTE — TELEPHONE ENCOUNTER
Patient would like to know if she should decrease her prednisone by 1 mg as discussed at a prior appointment.

## 2021-02-26 NOTE — TELEPHONE ENCOUNTER
Spoke with patient . A follow up appointment has been made for March 5th to see Amanda as her last office note stated she wanted to see her back around 1-.

## 2021-03-05 ENCOUNTER — OFFICE VISIT (OUTPATIENT)
Dept: FAMILY MEDICINE | Facility: OTHER | Age: 81
End: 2021-03-05
Attending: PHYSICIAN ASSISTANT
Payer: COMMERCIAL

## 2021-03-05 ENCOUNTER — TELEPHONE (OUTPATIENT)
Dept: FAMILY MEDICINE | Facility: OTHER | Age: 81
End: 2021-03-05

## 2021-03-05 VITALS
DIASTOLIC BLOOD PRESSURE: 64 MMHG | WEIGHT: 162.3 LBS | TEMPERATURE: 98.1 F | HEART RATE: 77 BPM | HEIGHT: 64 IN | SYSTOLIC BLOOD PRESSURE: 136 MMHG | OXYGEN SATURATION: 97 % | BODY MASS INDEX: 27.71 KG/M2

## 2021-03-05 DIAGNOSIS — M35.3 PMR (POLYMYALGIA RHEUMATICA) (H): Primary | ICD-10-CM

## 2021-03-05 DIAGNOSIS — E11.65 TYPE 2 DIABETES MELLITUS WITH HYPERGLYCEMIA, WITHOUT LONG-TERM CURRENT USE OF INSULIN (H): ICD-10-CM

## 2021-03-05 DIAGNOSIS — M26.609 TMJ (TEMPOROMANDIBULAR JOINT SYNDROME): ICD-10-CM

## 2021-03-05 PROCEDURE — G0463 HOSPITAL OUTPT CLINIC VISIT: HCPCS

## 2021-03-05 PROCEDURE — 99214 OFFICE O/P EST MOD 30 MIN: CPT | Performed by: PHYSICIAN ASSISTANT

## 2021-03-05 RX ORDER — METFORMIN HCL 500 MG
TABLET, EXTENDED RELEASE 24 HR ORAL
Qty: 360 TABLET | Refills: 3 | Status: SHIPPED | OUTPATIENT
Start: 2021-03-05

## 2021-03-05 RX ORDER — IBUPROFEN 600 MG/1
600 TABLET, FILM COATED ORAL EVERY 6 HOURS PRN
Qty: 60 TABLET | Refills: 3 | Status: SHIPPED | OUTPATIENT
Start: 2021-03-05

## 2021-03-05 RX ORDER — PREDNISONE 1 MG/1
3 TABLET ORAL DAILY
Qty: 270 TABLET | Refills: 1 | Status: SHIPPED | OUTPATIENT
Start: 2021-03-05 | End: 2021-09-07

## 2021-03-05 ASSESSMENT — PAIN SCALES - GENERAL: PAINLEVEL: NO PAIN (0)

## 2021-03-05 ASSESSMENT — MIFFLIN-ST. JEOR: SCORE: 1196.19

## 2021-03-05 NOTE — NURSING NOTE
"Chief Complaint   Patient presents with     Medication Follow-up       Initial /64 (BP Location: Right arm, Patient Position: Sitting, Cuff Size: Adult Regular)   Pulse 77   Temp 98.1  F (36.7  C) (Tympanic)   Ht 1.626 m (5' 4\")   Wt 73.6 kg (162 lb 4.8 oz)   SpO2 97%   BMI 27.86 kg/m   Estimated body mass index is 27.86 kg/m  as calculated from the following:    Height as of this encounter: 1.626 m (5' 4\").    Weight as of this encounter: 73.6 kg (162 lb 4.8 oz).  Medication Reconciliation: complete  Loulou Olivarez LPN  "

## 2021-03-05 NOTE — TELEPHONE ENCOUNTER
I called patient, Amandamary Luu PCP got message that she had not shown up for her Covid shot today at 2pm. Was notified at 2:30pm about her not showing up for the the shot. Patient was in appointment today and spoke about the shot and that it was at 2pm. Provider saw patient and was sent out the door at around 130ish today. Left message for patient to call back to let us know she is aware that she missed the shot.

## 2021-03-05 NOTE — PATIENT INSTRUCTIONS
Thank you for choosing [facility].   I have office hours 8:00 am to 4:30 pm on Monday's, Wednesday's, Thursday's and Friday's. My nurse and I are out of the office every Tuesday.    Following your visit, when your labs and diagnostic testing have returned, I will review then and you will be contacted by my nurse.  If you are on My Chart, you can also view results there.    For refills, notify your pharmacy regarding what you need and the pharmacy will generate a refill request. Do not call my nurse as she is unable to process refill request. Please plan ahead and allow 3-5 days for refill requests.    You will generally receive a reminder call the day prior to your appointment.  If you cannot attend your appointment, please cancel your appointment with as much notice as possible.  If there is a pattern of failure to present for your appointments, I cannot provide consistent, meaningful, ongoing care for you. It is very important to me that you come in for your care, so we can best assist you with your health care needs.    IMPORTANT:  Please note that it is my standard of practice to NOT participate in prescribing ongoing requested Narcotic Analgesic therapy, and/or participate in the prescribing of other controlled substances.  My nurse and I am happy to assist you with the process of referral for alternative pain management as needed, and other treatment modalities including but not limited to:  Physical Therapy, Physical Medicine and Rehab, Counseling, Chiropractic Care, Orthopedic Care, and non-narcotic medication management.     In the event that you need to be seen for emergent concerns and I am out of office,  please see one of my colleagues for acute concerns.  You may also present to  or ER.  I appreciate the opportunity to serve you and look forward to supporting your healthcare needs in the future. Please contact me with any questions or concerns that you may  have.    Sincerely,      Amanda Luu RN, PA-C

## 2021-03-05 NOTE — PROGRESS NOTES
Subjective     Ena Lanza is a 80 year old female who presents to clinic today for the following health issues:    HPI         Medication Followup of  Prednisone     Taking Medication as prescribed: yes    Side Effects:  None    Medication Helping Symptoms:  Yes;currently takes 3mg 1x daily     Diabetes Follow-up      How often are you checking your blood sugar? Daily     What concerns do you have today about your diabetes? None     Do you have any of these symptoms? (Select all that apply)  No numbness or tingling in feet.  No redness, sores or blisters on feet.  No complaints of excessive thirst.  No reports of blurry vision.  No significant changes to weight.    Have you had a diabetic eye exam in the last 12 months? Yes- Date of last eye exam: [facility] appointment.         BP Readings from Last 2 Encounters:   03/05/21 136/64   11/19/20 (!) 142/70     Hemoglobin A1C (%)   Date Value   11/13/2020 7.8 (H)   07/16/2020 7.9 (H)     LDL Cholesterol Calculated (mg/dL)   Date Value   06/11/2019 153 (H)   09/18/2018 138 (H)           Patient Active Problem List   Diagnosis     Lesion of sciatic nerve     Hyperlipidemia with target LDL less than 100     HTN (hypertension)     DDD (degenerative disc disease), lumbar     Essential hypertension     Type 2 diabetes mellitus without complication (H)     ACP (advance care planning)     Essential hypertension with goal blood pressure less than 140/90     Herpes zoster with complication     Type 2 diabetes mellitus with hyperglycemia, without long-term current use of insulin (H)     Aortic valve sclerosis     PMR (polymyalgia rheumatica) (H)     TMJ (temporomandibular joint syndrome)     History reviewed. No pertinent surgical history.    Social History     Tobacco Use     Smoking status: Never Smoker     Smokeless tobacco: Never Used     Tobacco comment: yes passive exposure   Substance Use Topics     Alcohol use: No     Alcohol/week: 0.0 standard drinks     Family  History   Problem Relation Age of Onset     C.A.D. Father 71        cause of death     Cancer Father         melanoma     Coronary Artery Disease Father      Thyroid Disease Mother      Hypertension Mother      Thyroid Disease Sister      Other Cancer Sister         melanoma     Diabetes No family hx of      Hyperlipidemia No family hx of      Asthma No family hx of      Breast Cancer No family hx of      Colon Cancer No family hx of          Current Outpatient Medications   Medication Sig Dispense Refill     aspirin 81 MG chewable tablet Take 1 tablet (81 mg) by mouth daily 108 tablet 3     blood glucose monitoring (ACCU-CHEK FASTCLIX) lancets Use to test blood sugar 2 times daily. 200 each 0     blood glucose monitoring (ACCU-CHEK CINTHIA SMARTVIEW) meter device kit Use to test blood sugar 2 times daily or as directed. 1 kit 0     Blood Glucose Monitoring Suppl Supplies MISC Test blood sugar 1x daily and as needed.       ibuprofen (ADVIL/MOTRIN) 600 MG tablet Take 1 tablet (600 mg) by mouth every 6 hours as needed for moderate pain 60 tablet 3     insulin isophane human (NOVOLIN N FLEXPEN) 100 UNIT/ML injection Inject 12 Units Subcutaneous daily Route: Inject 12 Units Subcutaneous daily - Subcutaneous 3 mL 3     lisinopril (ZESTRIL) 20 MG tablet Take 1.5 tablets (30 mg) by mouth daily 135 tablet 1     metFORMIN (GLUCOPHAGE-XR) 500 MG 24 hr tablet TAKE 2 TABLETS BY MOUTH TWICE DAILY WITH MEALS 360 tablet 3     OTHER MEDICAL SUPPLIES Accuccheck test strips       predniSONE (DELTASONE) 1 MG tablet Take 3 tablets (3 mg) by mouth daily 270 tablet 1     predniSONE (DELTASONE) 1 MG tablet Take 4 tablets (4 mg) by mouth daily 120 tablet 3     STATIN NOT PRESCRIBED, INTENTIONAL, Statin not prescribed intentionally due to Intolerance (with supporting documentation of trying a statin at least once within the last 5 years) (Patient not taking: Reported on 3/5/2021) 0 each 0     No Known Allergies  Recent Labs   Lab Test  "11/13/20  1330 07/16/20  1354 08/16/19  1407 06/11/19  1025 09/18/18  0922 01/19/17  0916 01/19/17  0916 06/13/16  1050   A1C 7.8* 7.9*  --  6.9* 6.5*   < > 6.8* 6.7*   LDL  --   --   --  153* 138*  --  107* 110*   HDL  --   --   --  63 75  --  69 67   TRIG  --   --   --  170* 201*  --  168* 202*   ALT  --  25  --  20  --   --   --  18   CR  --  0.78 0.98 0.78 0.79   < > 0.74 0.70   GFRESTIMATED  --  72 55* 73 70   < > 77 81   GFRESTBLACK  --  84 64 84 85   < > >90   GFR Calc   >90   GFR Calc     POTASSIUM  --  4.0 4.1 4.5 4.4   < > 3.5 3.6   TSH  --   --   --  1.14  --   --   --  1.03    < > = values in this interval not displayed.      BP Readings from Last 3 Encounters:   03/05/21 136/64   11/19/20 (!) 142/70   07/16/20 130/60    Wt Readings from Last 3 Encounters:   03/05/21 73.6 kg (162 lb 4.8 oz)   11/19/20 73.5 kg (162 lb)   07/16/20 73.5 kg (162 lb)                    Reviewed and updated as needed this visit by Provider                 Review of Systems   Constitutional, HEENT, cardiovascular, pulmonary, gi and gu systems are negative, except as otherwise noted.      Objective    /64 (BP Location: Right arm, Patient Position: Sitting, Cuff Size: Adult Regular)   Pulse 77   Temp 98.1  F (36.7  C) (Tympanic)   Ht 1.626 m (5' 4\")   Wt 73.6 kg (162 lb 4.8 oz)   SpO2 97%   BMI 27.86 kg/m    Body mass index is 27.86 kg/m .  Physical Exam   GENERAL: healthy, alert and no distress  EYES: Eyes grossly normal to inspection, PERRL and conjunctivae and sclerae normal  HENT: ear canals and TM's normal, nose and mouth without ulcers or lesions  NECK: no adenopathy, no asymmetry, masses, or scars and thyroid normal to palpation  RESP: lungs clear to auscultation - no rales, rhonchi or wheezes  CV: regular rate and rhythm, normal S1 S2, no S3 or S4, no murmur, click or rub, no peripheral edema and peripheral pulses strong  ABDOMEN: soft, nontender, no hepatosplenomegaly, no " "masses and bowel sounds normal  MS: no gross musculoskeletal defects noted, no edema    Diagnostic Test Results:  Labs reviewed in Epic  No results found for this or any previous visit (from the past 24 hour(s)).        Assessment & Plan     TMJ (temporomandibular joint syndrome)  Has been helping. She is keeping busy. Trying  Not to stay home.   - ibuprofen (ADVIL/MOTRIN) 600 MG tablet; Take 1 tablet (600 mg) by mouth every 6 hours as needed for moderate pain    Type 2 diabetes mellitus with hyperglycemia, without long-term current use of insulin (H)  Her sugars are ok. chad is on insulin if she has a high sugar due to prednisone. Is doing well with this and keeping sugars under 8.   - metFORMIN (GLUCOPHAGE-XR) 500 MG 24 hr tablet; TAKE 2 TABLETS BY MOUTH TWICE DAILY WITH MEALS    PMR (polymyalgia rheumatica) (H)  downto 3 mg. siff shoulders but that is it. Keeping on this for at least   - predniSONE (DELTASONE) 1 MG tablet; Take 3 tablets (3 mg) by mouth daily     BMI:   Estimated body mass index is 27.86 kg/m  as calculated from the following:    Height as of this encounter: 1.626 m (5' 4\").    Weight as of this encounter: 73.6 kg (162 lb 4.8 oz).   Weight management plan: Discussed healthy diet and exercise guidelines         See Patient Instructions    No follow-ups on file.    EFREN Troy  [facility] [place]  "

## 2021-06-18 ENCOUNTER — TRANSFERRED RECORDS (OUTPATIENT)
Dept: HEALTH INFORMATION MANAGEMENT | Facility: CLINIC | Age: 81
End: 2021-06-18

## 2021-06-18 LAB — RETINOPATHY: POSITIVE

## 2021-06-23 ENCOUNTER — HOSPITAL ENCOUNTER (EMERGENCY)
Facility: HOSPITAL | Age: 81
Discharge: HOME | End: 2021-06-23
Attending: NURSE PRACTITIONER | Admitting: NURSE PRACTITIONER
Payer: MEDICARE

## 2021-06-23 ENCOUNTER — NURSE TRIAGE (OUTPATIENT)
Dept: FAMILY MEDICINE | Facility: OTHER | Age: 81
End: 2021-06-23

## 2021-06-23 VITALS
DIASTOLIC BLOOD PRESSURE: 85 MMHG | HEART RATE: 63 BPM | TEMPERATURE: 97.5 F | OXYGEN SATURATION: 98 % | RESPIRATION RATE: 18 BRPM | SYSTOLIC BLOOD PRESSURE: 169 MMHG

## 2021-06-23 DIAGNOSIS — R03.0 ELEVATED BLOOD PRESSURE READING: ICD-10-CM
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
## 2021-06-23 PROCEDURE — 99213 OFFICE O/P EST LOW 20 MIN: CPT | Performed by: NURSE PRACTITIONER

## 2021-06-23 PROCEDURE — G0463 HOSPITAL OUTPT CLINIC VISIT: HCPCS

## 2021-06-23 RX ORDER — DOXYCYCLINE HYCLATE 100 MG
100 TABLET ORAL 2 TIMES DAILY
Qty: 20 TABLET | Refills: 0 | Status: SHIPPED | OUTPATIENT
Start: 2021-06-23 | End: 2021-07-03

## 2021-06-23 ASSESSMENT — ENCOUNTER SYMPTOMS
WEAKNESS: 0
DIARRHEA: 0
DIAPHORESIS: 0
ARTHRALGIAS: 0
DIZZINESS: 0
VOMITING: 0
LIGHT-HEADEDNESS: 0
CHILLS: 0
PHOTOPHOBIA: 0
NERVOUS/ANXIOUS: 1
HEADACHES: 0
FEVER: 0
NECK PAIN: 0
NAUSEA: 0
MYALGIAS: 0
EYE PAIN: 0
NECK STIFFNESS: 0
SHORTNESS OF BREATH: 0
FATIGUE: 0

## 2021-06-23 NOTE — DISCHARGE INSTRUCTIONS
Doxycycline as ordered  - Take entire course of antibiotic even if you start to feel better.  - Antibiotics can cause stomach upset including nausea and diarrhea. Read your bottle or ask the pharmacist if antibiotic can be taken with food to help prevent nausea. If you have symptoms of diarrhea you can take an over-the-counter probiotic and/or increase foods with probiotics such as yogurt, [place], sauerkraut.    Follow up with primary care provider or return to urgent care/ED as needed.

## 2021-06-23 NOTE — ED PROVIDER NOTES
"  History     Chief Complaint   Patient presents with     Tick Removal     HPI  Ena Lanza is a 80 year old female who presents to urgent care today (ambulatory) with complaints of a tick bite to right great toe with surrounding rash.  Denies any other bites to skin, no other rashes.  Patient unaware of when tick got embedded, tick removed last night.  Patient brought tick into urgent care today.  Patient very concerned about tick and possibility of infection or it spreading disease.  Full ROM to right foot.  Denies headache, arthralgia, myalgia or fatigue.     Blood pressure elevated today, patient takes lisinopril nightly.  Patient states \"my blood pressure is fine when I go see Amanda Luu.\"  Patient very anxious regarding tick bite.  Patient denies any headache, vision changes, photophobia, epistaxis, SOB or chest pain.     No other concerns.     Allergies:  No Known Allergies    Problem List:    Patient Active Problem List    Diagnosis Date Noted     PMR (polymyalgia rheumatica) (H) 12/03/2019     Priority: Medium     TMJ (temporomandibular joint syndrome) 12/03/2019     Priority: Medium     ACP (advance care planning) 08/23/2017     Priority: Medium     Advance Care Planning 8/23/2017: ACP Review of Chart / Resources Provided:  Reviewed chart for advance care plan.  Ena Lanza has no plan or code status on file however states presence of ACP document. Copy requested.   Added by Yari Barber        Advance Care Planning 5/31/2016: ACP Review of Chart / Resources Provided:  Reviewed chart for advance care plan.  Ena Lanza has been provided information and resources to begin or update their advance care plan.  Added by Maryann Garcia             Aortic valve sclerosis 08/23/2017     Priority: Medium     Type 2 diabetes mellitus with hyperglycemia, without long-term current use of insulin (H) 11/28/2016     Priority: Medium     Essential hypertension with goal blood pressure less than 140/90 " 06/13/2016     Priority: Medium     Herpes zoster with complication 06/13/2016     Priority: Medium     Essential hypertension 10/05/2015     Priority: Medium     Type 2 diabetes mellitus without complication (H) 10/05/2015     Priority: Medium     DDD (degenerative disc disease), lumbar 06/08/2015     Priority: Medium     Hyperlipidemia with target LDL less than 100 10/29/2014     Priority: Medium     Diagnosis updated by automated process. Provider to review and confirm.       HTN (hypertension) 10/29/2014     Priority: Medium     Lesion of sciatic nerve 08/27/2014     Priority: Medium        Past Medical History:    Past Medical History:   Diagnosis Date     Anxiety      Essential hypertension 10/5/2015     Essential hypertension with goal blood pressure less than 140/90 6/13/2016     Herpes zoster with complication 6/13/2016     HTN (hypertension) 10/29/2014     Hyperlipidemia LDL goal < 100 10/29/2014     Hyperlipidemia LDL goal < 100 10/29/2014     Lesion of sciatic nerve 8/27/2014     Newly recognized heart murmur 6/13/2016     Type 2 diabetes mellitus with hyperglycemia, without long-term current use of insulin (H) 11/28/2016     Type 2 diabetes mellitus without complication (H) 10/5/2015       Past Surgical History:    No past surgical history on file.    Family History:    Family History   Problem Relation Age of Onset     C.A.D. Father 71        cause of death     Cancer Father         melanoma     Coronary Artery Disease Father      Thyroid Disease Mother      Hypertension Mother      Thyroid Disease Sister      Other Cancer Sister         melanoma     Diabetes No family hx of      Hyperlipidemia No family hx of      Asthma No family hx of      Breast Cancer No family hx of      Colon Cancer No family hx of        Social History:  Marital Status:   [5]  Social History     Tobacco Use     Smoking status: Never Smoker     Smokeless tobacco: Never Used     Tobacco comment: yes passive exposure    Substance Use Topics     Alcohol use: No     Alcohol/week: 0.0 standard drinks     Drug use: No        Medications:    doxycycline hyclate (VIBRA-TABS) 100 MG tablet  aspirin 81 MG chewable tablet  blood glucose monitoring (ACCU-CHEK FASTCLIX) lancets  blood glucose monitoring (ACCU-CHEK CINTHIA SMARTVIEW) meter device kit  Blood Glucose Monitoring Suppl Supplies MISC  ibuprofen (ADVIL/MOTRIN) 600 MG tablet  insulin isophane human (NOVOLIN N FLEXPEN) 100 UNIT/ML injection  lisinopril (ZESTRIL) 20 MG tablet  metFORMIN (GLUCOPHAGE-XR) 500 MG 24 hr tablet  OTHER MEDICAL SUPPLIES  predniSONE (DELTASONE) 1 MG tablet  predniSONE (DELTASONE) 1 MG tablet  STATIN NOT PRESCRIBED, INTENTIONAL,      Review of Systems   Constitutional: Negative for chills, diaphoresis, fatigue and fever.   HENT: Negative for congestion and nosebleeds.    Eyes: Negative for photophobia, pain and visual disturbance.   Respiratory: Negative for shortness of breath.    Cardiovascular: Negative for chest pain.   Gastrointestinal: Negative for diarrhea, nausea and vomiting.   Musculoskeletal: Negative for arthralgias, gait problem, myalgias, neck pain and neck stiffness.   Skin: Positive for rash (Localized skin reaction to right great toe from tick bite).   Neurological: Negative for dizziness, weakness, light-headedness and headaches.   Psychiatric/Behavioral: The patient is nervous/anxious.      Physical Exam   BP: (!) 205/99  Pulse: 86  Temp: 98.9  F (37.2  C)  Resp: 20  SpO2: 98 %  Recheck 172/79    Physical Exam  Vitals signs and nursing note reviewed.   Constitutional:       General: She is not in acute distress.     Appearance: She is not ill-appearing.   HENT:      Head: Normocephalic.   Neck:      Musculoskeletal: Normal range of motion and neck supple. No neck rigidity or muscular tenderness.   Cardiovascular:      Rate and Rhythm: Normal rate and regular rhythm.      Pulses: Normal pulses.      Heart sounds: Normal heart sounds.    Pulmonary:      Effort: Pulmonary effort is normal.      Breath sounds: Normal breath sounds.   Musculoskeletal:      Right ankle: Normal.   Skin:     General: Skin is warm and dry.      Capillary Refill: Capillary refill takes less than 2 seconds.      Findings: Erythema and rash (Rash to right great toe from tick bite) present.   Neurological:      Mental Status: She is alert.   Psychiatric:         Mood and Affect: Mood normal.       ED Course     No results found for this or any previous visit (from the past 24 hour(s)).    Medications - No data to display    Assessments & Plan (with Medical Decision Making)     I have reviewed the nursing notes.    I have reviewed the findings, diagnosis, plan and need for follow up with the patient.  (W57.XXXA) Tick bite  Plan:   Patient has localized redness and swelling surrounding where tick was embedded.  Tick does not appear to be deer tick but unable to fully determine.  Patient very anxious and nervous about possible infection and tick borne diseases.  Will prescribe doxycycline.  Patient more at ease after doxycycline ordered.  Patient to take over-the-counter probiotic with antibiotic.  Patient denies any fever, chills, nausea, vomiting, diarrhea, shortness of breath and chest pain.  Patient denies any lethargy, neck stiffness, arthralgia, and body aches.    (R03.0) Elevated blood pressure reading  Plan:   Patient's blood pressure elevated upon arrival.  Patient states that she is very anxious and worried about the tick bite.  After antibiotic prescribed and education completed recheck patient's blood pressure and decreased to 169/85.  Patient states it is elevated because I was nervous and anxious.  Reviewed symptomatic hypertension symptoms and patient denies any symptoms such as headache, blurred vision, nosebleeds, shortness of breath and chest pain.  Patient states will follow up with [facility] as needed for elevated blood pressure.  Patient takes  lisinopril nightly.    Patient is in agreement with treatment plan and will follow up as needed.    Discharge Medication List as of 6/23/2021 12:03 PM      START taking these medications    Details   doxycycline hyclate (VIBRA-TABS) 100 MG tablet Take 1 tablet (100 mg) by mouth 2 times daily for 10 days, Disp-20 tablet, R-0, E-Prescribe           Final diagnoses:   Tick bite   Elevated blood pressure reading     6/23/2021   [facility]     Breanna Oliver, NP  06/23/21 [id number]

## 2021-06-23 NOTE — TELEPHONE ENCOUNTER
Patient called again stating that she really need's to see Amanda.  She doesn't want to see anyone else or go to [facility].  She states even a phone call from her would help.  Please call her back. Patient is apologizing for being difficult but she states her toe really hurts.    [phone]

## 2021-06-23 NOTE — ED TRIAGE NOTES
Pt presents with right great toe pain. Granddaughter states that pt said it was burning and itchy and when she looked she saw a woodtick between her toes. Area is red and swollen. Pt states it feels like neuropathy. They found tick yesterday. Pt states she put the neuropathy cream on and iced it.

## 2021-06-23 NOTE — ED TRIAGE NOTES
"Patient presents with right great toe pain.  Notes that her toes has been hurting for two days \"nerve like pain\" last night her granddaughter was over and looked at her toe and found a large tick;  They have since removed the tick but patient continues to have pain/swelling/discomfort.  (p[atient has the tick with her)  "

## 2021-06-23 NOTE — TELEPHONE ENCOUNTER
"Patient would like to be overbooked today.  Please call her and let her know.  States her great toe  is burning and red and swollen terrible and a little swollen on the top of her foot.  Offered her appointment in [facility] but she would rather see Amanda    Reason for Disposition    Patient wants to be seen    Answer Assessment - Initial Assessment Questions  1. TYPE of TICK: \"Is it a wood tick or a deer tick?\" If unsure, ask: \"What size was the tick?\" \"Did it look more like a watermelon seed or a poppy seed?\"       Big tic  Toe is burning and swollen and red.  She has the tic   2. LOCATION: \"Where is the tick bite located?\"       Toe between and underneath the great toe   3. ONSET: \"How long do you think the tick was attached before you removed it?\" (Hours or days)       24 hours  4. TETANUS: \"When was the last tetanus booster?\"       unknown  5. PREGNANCY: \"Is there any chance you are pregnant?\" \"When was your last menstrual period?\"      no    Protocols used: TICK BITE-A-OH      "

## 2021-06-24 DIAGNOSIS — I10 ESSENTIAL HYPERTENSION WITH GOAL BLOOD PRESSURE LESS THAN 140/90: ICD-10-CM

## 2021-06-24 RX ORDER — LISINOPRIL 20 MG/1
30 TABLET ORAL DAILY
Qty: 135 TABLET | Refills: 1 | Status: SHIPPED | OUTPATIENT
Start: 2021-06-24 | End: 2021-12-07

## 2021-06-24 NOTE — TELEPHONE ENCOUNTER
lisinopril (ZESTRIL) 20 MG tablet      Last Written Prescription Date:  07/29/20  Last Fill Quantity: 135,   # refills: 1  Last Office Visit: 03/05/21  Future Office visit:       Routing refill request to provider for review/approval because:    Blood pressure under 140/90 in past 12 months        BP Readings from Last 3 Encounters:   06/23/21 169/85   03/05/21 136/64   11/19/20 (!) 142/70

## 2021-07-15 DIAGNOSIS — Z79.4 TYPE 2 DIABETES MELLITUS WITH HYPERGLYCEMIA, WITH LONG-TERM CURRENT USE OF INSULIN (H): ICD-10-CM

## 2021-07-15 DIAGNOSIS — E11.65 TYPE 2 DIABETES MELLITUS WITH HYPERGLYCEMIA, WITH LONG-TERM CURRENT USE OF INSULIN (H): ICD-10-CM

## 2021-07-15 RX ORDER — HUMAN INSULIN 100 [IU]/ML
12 INJECTION, SUSPENSION SUBCUTANEOUS DAILY
Qty: 3 ML | Refills: 3 | Status: SHIPPED | OUTPATIENT
Start: 2021-07-15 | End: 2022-04-11

## 2021-07-15 NOTE — TELEPHONE ENCOUNTER
insulin isophane human (NOVOLIN N FLEXPEN) 100 UNIT/ML injection      Last Written Prescription Date:  5/8/20  Last Fill Quantity: 3,   # refills: 3  Last Office Visit: 3/5/21  Future Office visit:       Pt out, please advise

## 2021-09-07 DIAGNOSIS — M35.3 PMR (POLYMYALGIA RHEUMATICA) (H): ICD-10-CM

## 2021-09-07 RX ORDER — PREDNISONE 1 MG/1
3 TABLET ORAL DAILY
Qty: 270 TABLET | Refills: 1 | Status: SHIPPED | OUTPATIENT
Start: 2021-09-07

## 2021-09-07 NOTE — TELEPHONE ENCOUNTER
predniSONE (DELTASONE) 1 MG tablet      Last Written Prescription Date:  3/5/21  Last Fill Quantity: 270,   # refills: 1  Last Office Visit: 3/5/21  Future Office visit:       Routing refill request to provider for review/approval because:  Drug not on the [facility], [facility] or [facility] refill protocol or controlled substance

## 2021-11-08 ENCOUNTER — TELEPHONE (OUTPATIENT)
Dept: FAMILY MEDICINE | Facility: OTHER | Age: 81
End: 2021-11-08
Payer: COMMERCIAL

## 2021-11-08 NOTE — TELEPHONE ENCOUNTER
1:47 PM    Reason for Call: Phone Call    Description: Has a question if she should schedule an appointment with Amanda Luu and if she should get a flu shot?     Was an appointment offered for this call? No  If yes : Appointment type              Date    Preferred method for responding to this message: Telephone   What is your phone number ? [phone]    If we cannot reach you directly, may we leave a detailed response at the number you provided? Yes    Can this message wait until your PCP/provider returns, if available today? YES, No    Saloni Diaz

## 2021-11-23 ENCOUNTER — NURSE TRIAGE (OUTPATIENT)
Dept: FAMILY MEDICINE | Facility: OTHER | Age: 81
End: 2021-11-23
Payer: COMMERCIAL

## 2021-11-23 NOTE — TELEPHONE ENCOUNTER
"Patient called stating she is not feeling well.  Patient has been exposed to covid.  She denies coming in to be tested, states she just doesn't feel up to it.  Agree's to ER/UC if symptoms worsen.  States her son is keeping a good eye on her and caring for her.  Patient called requesting that provider is informed about her condition.      Reason for Disposition    Patient wants to be seen    Answer Assessment - Initial Assessment Questions  1. ONSET: \"When did the cough begin?\"       Thursday  2. SEVERITY: \"How bad is the cough today?\"       harsh  3. RESPIRATORY DISTRESS: \"Describe your breathing.\"       No wheezing  4. FEVER: \"Do you have a fever?\" If so, ask: \"What is your temperature, how was it measured, and when did it start?\"      No fever  Has chill  5. HEMOPTYSIS: \"Are you coughing up any blood?\" If so ask: \"How much?\" (flecks, streaks, tablespoons, etc.)      Phlegm is white/yellow  6. TREATMENT: \"What have you done so far to treat the cough?\" (e.g., meds, fluids, humidifier)      dayquil tylenol  7. CARDIAC HISTORY: \"Do you have any history of heart disease?\" (e.g., heart attack, congestive heart failure)       no  8. LUNG HISTORY: \"Do you have any history of lung disease?\"  (e.g., pulmonary embolus, asthma, emphysema)      no  9. PE RISK FACTORS: \"Do you have a history of blood clots?\" (or: recent major surgery, recent prolonged travel, bedridden)      no  10. OTHER SYMPTOMS: \"Do you have any other symptoms? (e.g., runny nose, wheezing, chest pain)       no  11. PREGNANCY: \"Is there any chance you are pregnant?\" \"When was your last menstrual period?\"        no  12. TRAVEL: \"Have you traveled out of the country in the last month?\" (e.g., travel history, exposures)        no    Answer Assessment - Initial Assessment Questions  1. COVID-19 EXPOSURE: \"Please describe how you were exposed to someone with a COVID-19 infection.\"      family  2. PLACE of CONTACT: \"Where were you when you were exposed to " "COVID-19?\" (e.g., home, school, medical waiting room; which city?)      home  3. TYPE of CONTACT: \"How much contact was there?\" (e.g., sitting next to, live in same house, work in same office, same building)      Lives with  4. DURATION of CONTACT: \"How long were you in contact with the COVID-19 patient?\" (e.g., a few seconds, passed by person, a few minutes, 15 minutes or longer, live with the patient)      Lives with  5. MASK: \"Were you wearing a mask?\" \"Was the other person wearing a mask?\" Note: wearing a mask reduces the risk of an otherwise close contact.      no  6. DATE of CONTACT: \"When did you have contact with a COVID-19 patient?\" (e.g., how many days ago)      today  7. COMMUNITY SPREAD: \"Are there lots of cases of COVID-19 (community spread) where you live?\" (See public health department website, if unsure)        yes  8. SYMPTOMS: \"Do you have any symptoms?\" (e.g., fever, cough, breathing difficulty, loss of taste or smell)      cough  9. PREGNANCY OR POSTPARTUM: \"Is there any chance you are pregnant?\" \"When was your last menstrual period?\" \"Did you deliver in the last 2 weeks?\"      no  10. HIGH RISK: \"Do you have any heart or lung problems?\" \"Do you have a weak immune system?\" (e.g., heart failure, COPD, asthma, HIV positive, chemotherapy, renal failure, diabetes mellitus, sickle cell anemia, obesity)        no  11. TRAVEL: \"Have you traveled out of the country recently?\" If Yes, ask: \"When and where?\" Also ask about out-of-state travel, since the [facility] has identified some high-risk cities for community spread in the . Note: Travel becomes less relevant if there is widespread community transmission where the patient lives.        no    Protocols used: COUGH-A-OH, CORONAVIRUS (COVID-19) EXPOSURE-A- 8.25.2021    COVID 19 Nurse Triage Plan/Patient Instructions    Please be aware that novel coronavirus (COVID-19) may be circulating in the community. If you develop symptoms such as fever, cough, or SOB " "or if you have concerns about the presence of another infection including coronavirus (COVID-19), please contact your health care provider or visit [email or web address].     Disposition/Instructions    Additional COVID19 information to add for patients.   How can I protect others?  If you have symptoms (fever, cough, body aches or trouble breathing): Stay home and away from others (self-isolate) until:    At least 10 days have passed since your symptoms started, And     You ve had no fever--and no medicine that reduces fever--for 1 full day (24 hours), And      Your other symptoms have resolved (gotten better).     If you don t have symptoms, but a test showed that you have COVID-19 (you tested positive):    Stay home and away from others (self-isolate). Follow the tips under \"How do I self-isolate?\" below for 10 days (20 days if you have a weak immune system).    You don't need to be retested for COVID-19 before going back to school or work. As long as you're fever-free and feeling better, you can go back to school, work and other activities after waiting the 10 or 20 days.     How do I self-isolate?    Stay in your own room, even for meals. Use your own bathroom if you can.     Stay away from others in your home. No hugging, kissing or shaking hands. No visitors.    Don t go to work, school or anywhere else.     Clean  high touch  surfaces often (doorknobs, counters, handles, etc.). Use a household cleaning spray or wipes. You ll find a full list on the EPA website:  www.epa.gov/pesticide-registration/list-n-disinfectants-use-against-sars-cov-2.    Cover your mouth and nose with a mask, tissue or washcloth to avoid spreading germs.    Wash your hands and face often. Use soap and water.    Caregivers in these groups are at risk for severe illness due to COVID-19:  o People 65 years and older  o People who live in a nursing home or long-term care facility  o People with chronic disease (lung, heart, cancer, " diabetes, kidney, liver, immunologic)  o People who have a weakened immune system, including those who:  - Are in cancer treatment  - Take medicine that weakens the immune system, such as corticosteroids  - Had a bone marrow or organ transplant  - Have an immune deficiency  - Have poorly controlled HIV or AIDS  - Are obese (body mass index of 40 or higher)  - Smoke regularly    Caregivers should wear gloves while washing dishes, handling laundry and cleaning bedrooms and bathrooms.    Use caution when washing and drying laundry: Don t shake dirty laundry, and use the warmest water setting that you can.    For more tips, go to www.cdc.gov/coronavirus/2019-ncov/downloads/10Things.pdf.    How can I take care of myself?  1. Get lots of rest. Drink extra fluids (unless a doctor has told you not to).     2. Take Tylenol (acetaminophen) for fever or pain. If you have liver or kidney problems, ask your family doctor if it s okay to take Tylenol.     Adults can take either:     650 mg (two 325 mg pills) every 4 to 6 hours, or     1,000 mg (two 500 mg pills) every 8 hours as needed.     Note: Don t take more than 3,000 mg in one day.   Acetaminophen is found in many medicines (both prescribed and over-the-counter medicines). Read all labels to be sure you don t take too much.     For children, check the Tylenol bottle for the right dose. The dose is based on the child s age or weight.    3. If you have other health problems (like cancer, heart failure, an organ transplant or severe kidney disease): Call your specialty clinic if you don t feel better in the next 2 days.    4. Know when to call 911: Emergency warning signs include:    Trouble breathing or shortness of breath    Pain or pressure in the chest that doesn t go away    Feeling confused like you haven t felt before, or not being able to wake up    Bluish-colored lips or face    What are the symptoms of COVID-19?     The most common symptoms are cough, fever and  trouble breathing.     Less common symptoms include body aches, chills, diarrhea (loose, watery poops), fatigue (feeling very tired), headache, runny nose, sore throat and loss of smell.    COVID-19 can cause severe coughing (bronchitis) and lung infection (pneumonia).    How does it spread?     The virus may spread when a person coughs or sneezes into the air. The virus can travel about 6 feet this way, and it can live on surfaces.      Common  (household disinfectants) will kill the virus.    Who is at risk?  Anyone can catch COVID-19 if they re around someone who has the virus.    How can others protect themselves?     Stay away from people who have COVID-19 (or symptoms of COVID-19).    Wash hands often with soap and water. Or, use hand  with at least 60% alcohol.    Avoid touching the eyes, nose or mouth.     Wear a face mask when you go out in public, when sick or when caring for a sick person.    Where can I get more information?    [facility]: About COVID-19: [email or web address]    CDC: What to Do If You re Sick: www.cdc.gov/coronavirus/2019-ncov/about/steps-when-sick.html    CDC: Ending Home Isolation: www.cdc.gov/coronavirus/2019-ncov/hcp/disposition-in-home-patients.html     CDC: Caring for Someone: www.cdc.gov/coronavirus/2019-ncov/if-you-are-sick/care-for-someone.html     [facility]: Interim Guidance for Hospital Discharge to Home: www.health.[email or web address].[place]/diseases/coronavirus/hcp/hospdischarge.pdf    [facility] clinical trials (COVID-19 research studies): clinicalaffairs.[facility]/umn-clinical-trials     Below are the COVID-19 hotlines at the [place] Department of Health ([facility]). Interpreters are available.   o For health questions: Call 289-422-9642 or 1-788.824.6345 (7 a.m. to 7 p.m.)  o For questions about schools and childcare: Call 044-480-7627 or 1-793.654.2932 (7 a.m. to 7 p.m.)          Thank you for taking steps to prevent the spread of this  virus.  o Limit your contact with others.  o Wear a simple mask to cover your cough.  o Wash your hands well and often.    Resources    [facility]: About COVID-19: [email or web address]    CDC: What to Do If You're Sick: www.cdc.gov/coronavirus/2019-ncov/about/steps-when-sick.html    CDC: Ending Home Isolation: www.cdc.gov/coronavirus/2019-ncov/hcp/disposition-in-home-patients.html     CDC: Caring for Someone: www.cdc.gov/coronavirus/2019-ncov/if-you-are-sick/care-for-someone.html     [facility]: Interim Guidance for Hospital Discharge to Home: www.[facility].mn./diseases/coronavirus/hcp/hospdischarge.pdf    [facility] clinical trials (COVID-19 research studies): clinicalaffairs.[facility]-clinical-trials     Below are the COVID-19 hotlines at the [place] Department of Health ([facility]). Interpreters are available.   o For health questions: Call 289-743-3445 or 1-666.307.1581 (7 a.m. to 7 p.m.)  o For questions about schools and childcare: Call 344-948-5364 or 1-613.400.7174 (7 a.m. to 7 p.m.)

## 2021-12-05 DIAGNOSIS — I10 ESSENTIAL HYPERTENSION WITH GOAL BLOOD PRESSURE LESS THAN 140/90: ICD-10-CM

## 2021-12-07 RX ORDER — LISINOPRIL 20 MG/1
TABLET ORAL
Qty: 135 TABLET | Refills: 1 | Status: SHIPPED | OUTPATIENT
Start: 2021-12-07

## 2021-12-07 NOTE — TELEPHONE ENCOUNTER
lisinopril (ZESTRIL) 20 MG tablet      Last Written Prescription Date:  6-24-21  Last Fill Quantity: 135,   # refills: 1  Last Office Visit: 3-5-21  Future Office visit:       Routing refill request to provider for review/approval because:   Blood pressure under 140/90 in past 12 months    Normal serum creatinine on file in past 12 months    Normal serum potassium on file in past 12 months       Creatinine   Date Value Ref Range Status   07/16/2020 0.78 0.52 - 1.04 mg/dL Final       BP Readings from Last 3 Encounters:   06/23/21 169/85   03/05/21 136/64   11/19/20 (!) 142/70       Potassium   Date Value Ref Range Status   07/16/2020 4.0 3.4 - 5.3 mmol/L Final

## 2022-04-07 ENCOUNTER — TELEPHONE (OUTPATIENT)
Dept: FAMILY MEDICINE | Facility: OTHER | Age: 82
End: 2022-04-07
Payer: COMMERCIAL

## 2022-04-07 DIAGNOSIS — Z79.4 TYPE 2 DIABETES MELLITUS WITH HYPERGLYCEMIA, WITH LONG-TERM CURRENT USE OF INSULIN (H): ICD-10-CM

## 2022-04-07 DIAGNOSIS — E11.65 TYPE 2 DIABETES MELLITUS WITH HYPERGLYCEMIA, WITHOUT LONG-TERM CURRENT USE OF INSULIN (H): Primary | ICD-10-CM

## 2022-04-07 DIAGNOSIS — E11.65 TYPE 2 DIABETES MELLITUS WITH HYPERGLYCEMIA, WITH LONG-TERM CURRENT USE OF INSULIN (H): ICD-10-CM

## 2022-04-07 NOTE — TELEPHONE ENCOUNTER
Received a PA from [employer] for NovoLIN N FlexPen ReliOn 100UNIT/ML pen-injectors. Submitted on CMM. Waiting for a response.

## 2022-04-08 NOTE — TELEPHONE ENCOUNTER
Received a DENIAL from [employer] for Novolin N Flexpen ReliOn 100unit/ml pen-injectors.    Forms scanned to Epic.

## 2022-04-11 RX ORDER — HUMAN INSULIN 100 [IU]/ML
12 INJECTION, SUSPENSION SUBCUTANEOUS DAILY
Qty: 3 ML | Refills: 0 | Status: SHIPPED | OUTPATIENT
Start: 2022-04-11

## 2022-04-11 NOTE — TELEPHONE ENCOUNTER
Looks like she needs an appointment with Amanda.  Also does she need a fill of her insulin or is she paying out of pocket for her insulin?    Milagro Florence APRN FNP-BC  Family Nurse Practitioner

## 2023-01-24 NOTE — PATIENT INSTRUCTIONS
-Keep following your current meal plan.  Avoid eating too many carbohydrates.    -Be as active as you are able.  Exercise helps keep your glucose levels lower.  -Good times to test your glucose are fasting, before a meal or 2 hours after a meal.  -Target levels are fasting and before meals , 2 hours after meals less than 180.    -Decrease your NPH insulin to 8 units.  Take it in the morning before you eat your breakfast daily.  -When you decrease your Prednisone to 20 units next week then decrease your NPH insulin to 4 units.   -Call if you have any concerns.  VIKAS Hood, [phone].    Right/Common Femoral/Artery